# Patient Record
Sex: FEMALE | Race: WHITE | NOT HISPANIC OR LATINO | Employment: FULL TIME | URBAN - METROPOLITAN AREA
[De-identification: names, ages, dates, MRNs, and addresses within clinical notes are randomized per-mention and may not be internally consistent; named-entity substitution may affect disease eponyms.]

---

## 2017-04-21 ENCOUNTER — TELEMEDICINE2 (OUTPATIENT)
Dept: URGENT CARE | Facility: CLINIC | Age: 52
End: 2017-04-21
Payer: COMMERCIAL

## 2017-04-21 DIAGNOSIS — J01.40 ACUTE NON-RECURRENT PANSINUSITIS: ICD-10-CM

## 2017-04-21 PROCEDURE — 99214 OFFICE O/P EST MOD 30 MIN: CPT | Mod: GT | Performed by: PHYSICIAN ASSISTANT

## 2017-04-21 RX ORDER — AMOXICILLIN AND CLAVULANATE POTASSIUM 875; 125 MG/1; MG/1
1 TABLET, FILM COATED ORAL 2 TIMES DAILY
Qty: 14 TAB | Refills: 0 | Status: SHIPPED | OUTPATIENT
Start: 2017-04-21 | End: 2018-11-23

## 2017-04-21 ASSESSMENT — ENCOUNTER SYMPTOMS
SORE THROAT: 1
CHILLS: 0
SHORTNESS OF BREATH: 0
DIZZINESS: 0
SWOLLEN GLANDS: 1
ABDOMINAL PAIN: 0
MYALGIAS: 0
HEADACHES: 0
COUGH: 0
NAUSEA: 1
PALPITATIONS: 0
WHEEZING: 0
FEVER: 0
FLANK PAIN: 0
DIARRHEA: 1
VOMITING: 1
SINUS PRESSURE: 1

## 2017-04-21 NOTE — PROGRESS NOTES
Subjective:      Heaven Chavis is a 51 y.o. female who presents with Sinus Problem    Encounter was completed using secure and encrypted videoconferencing equipment, the patient gave consent, and patient identification was confirmed           Sinus Problem  This is a new problem. The current episode started in the past 7 days. The problem has been gradually worsening since onset. There has been no fever. Associated symptoms include congestion, sinus pressure, a sore throat and swollen glands. Pertinent negatives include no chills, coughing, ear pain, headaches or shortness of breath. Past treatments include oral decongestants. The treatment provided no relief.       PMH:  has no past medical history on file.  MEDS:   Current outpatient prescriptions:   •  Levothyroxine Sodium (SYNTHROID PO), Take  by mouth., Disp: , Rfl:   •  NALTREXONE HCL PO, Take  by mouth., Disp: , Rfl:   •  TEMAZEPAM PO, Take  by mouth., Disp: , Rfl:   •  LIOTRIX, T3-T4, PO, Take  by mouth., Disp: , Rfl:   •  modafinil (PROVIGIL) 200 MG Tab, Take 200 mg by mouth every day., Disp: , Rfl:   •  cyclobenzaprine (FLEXERIL) 10 MG Tab, Take 1 Tab by mouth 3 times a day as needed., Disp: 30 Tab, Rfl: 0  ALLERGIES: No Known Allergies  SURGHX: No past surgical history on file.  SOCHX:    FH: family history is not on file.      Review of Systems   Constitutional: Positive for malaise/fatigue. Negative for fever and chills.   HENT: Positive for congestion, nosebleeds, sinus pressure and sore throat. Negative for ear pain.    Respiratory: Negative for cough, shortness of breath and wheezing.    Cardiovascular: Negative for chest pain, palpitations and leg swelling.   Gastrointestinal: Positive for nausea, vomiting and diarrhea. Negative for abdominal pain.   Genitourinary: Negative for dysuria, hematuria and flank pain.   Musculoskeletal: Negative for myalgias and joint pain.   Neurological: Negative for dizziness and headaches.       Medications,  Allergies, and current problem list reviewed today in Epic     Objective:     There were no vitals taken for this visit.     Physical Exam   Constitutional: She is oriented to person, place, and time. She appears well-developed and well-nourished. No distress.   HENT:   Head: Normocephalic and atraumatic.   Right Ear: External ear normal.   Left Ear: External ear normal.   Eyes: Right eye exhibits no discharge. Left eye exhibits no discharge.   Neurological: She is alert and oriented to person, place, and time.   Psychiatric: She has a normal mood and affect. Her behavior is normal. Judgment and thought content normal.   Nursing note and vitals reviewed.              Assessment/Plan:     1. Acute non-recurrent pansinusitis  amoxicillin-clavulanate (AUGMENTIN) 875-125 MG Tab     Patient states all GI symptoms have resolved. She is now only having upper respiratory/sinus symptoms. From Limited exam and presentation appear she has a viral upper respiratory infection. If no improvement over the next several days she will come in clinic to be seen in person.  Patient was given a contingent antibiotic prescription to fill and use as directed if symptoms progressed as discussed and agreed upon.  Tylenol and Motrin for fever and pain  OTC meds as discussed including oral decongestant if tolerated  Increase fluids and rest  Nasal spray, nasal wash, Grisel pot    Return to clinic or go to ED if symptoms worsen or persist. Indications for ED discussed at length. Patient voices understanding. Follow-up with your primary care provider in 3-5 days. Red flags discussed.    Please note that this dictation was created using voice recognition software. I have made every reasonable attempt to correct obvious errors, but I expect that there are errors of grammar and possibly content that I did not discover before finalizing the note.

## 2017-04-21 NOTE — MR AVS SNAPSHOT
Heaven Chavis   2017 1:30 PM   Appointment   MRN: [id number]    Department:   Group   Dept Phone:  [phone]    Description:  Female : 1965   Provider:  JACOB THAKUR PROVIDER           Allergies as of 2017     No Known Allergies      Basic Information     Date Of Birth Sex Race Ethnicity Preferred Language    1965 Female White Unknown English      Health Maintenance        Date Due Completion Dates    IMM DTaP/Tdap/Td Vaccine (1 - Tdap) 7/15/1982 ---    PAP SMEAR 7/15/1984 ---    MAMMOGRAM 7/15/2003 ---    COLONOSCOPY 7/15/2013 ---            Current Immunizations     No immunizations on file.      Below and/or attached are the medications your provider expects you to take. Review all of your home medications and newly ordered medications with your provider and/or pharmacist. Follow medication instructions as directed by your provider and/or pharmacist. Please keep your medication list with you and share with your provider. Update the information when medications are discontinued, doses are changed, or new medications (including over-the-counter products) are added; and carry medication information at all times in the event of emergency situations     Allergies:  No Known Allergies          Medications  Valid as of: 2017 -  1:37 PM    Generic Name Brand Name Tablet Size Instructions for use    Cyclobenzaprine HCl (Tab) FLEXERIL 10 MG Take 1 Tab by mouth 3 times a day as needed.        Levothyroxine Sodium   Take  by mouth.        Liotrix (T3-T4)   Take  by mouth.        Modafinil (Tab) PROVIGIL 200 MG Take 200 mg by mouth every day.        Naltrexone HCl   Take  by mouth.        Temazepam   Take  by mouth.        .                 Medicines prescribed today were sent to:     [id number] - [place], [place] - [address] [facility] [address] [facility] [address]    [address] [place] [address]    Phone: [phone] Fax: [phone]       Open 24 Hours?: No      Medication refill instructions:       If your prescription bottle indicates you have medication refills left, it is not necessary to call your provider’s office. Please contact your pharmacy and they will refill your medication.    If your prescription bottle indicates you do not have any refills left, you may request refills at any time through one of the following ways: The online Ideal Power system (except Urgent Care), by calling your provider’s office, or by asking your pharmacy to contact your provider’s office with a refill request. Medication refills are processed only during regular business hours and may not be available until the next business day. Your provider may request additional information or to have a follow-up visit with you prior to refilling your medication.   *Please Note: Medication refills are assigned a new Rx number when refilled electronically. Your pharmacy may indicate that no refills were authorized even though a new prescription for the same medication is available at the pharmacy. Please request the medicine by name with the pharmacy before contacting your provider for a refill.           Ideal Power Access Code: [email or web address]  Expires: 5/21/2017  1:37 PM    Ideal Power  A secure, online tool to manage your health information     [employer]’s Ideal Power® is a secure, online tool that connects you to your personalized health information from the privacy of your home -- day or night - making it very easy for you to manage your healthcare. Once the activation process is completed, you can even access your medical information using the Ideal Power khalida, which is available for free in the Apple Khalida store or Google Play store.     Ideal Power provides the following levels of access (as shown below):   My Chart Features   [facility] Doctor [facility]  Specialists [facility]  Urgent  Care Non-[facility]  Primary Care  Doctor   Email your healthcare team securely and privately 24/7 X X  X    Manage appointments: schedule your next appointment; view details of past/upcoming appointments X      Request prescription refills. X      View recent personal medical records, including lab and immunizations X X X X   View health record, including health history, allergies, medications X X X X   Read reports about your outpatient visits, procedures, consult and ER notes X X X X   See your discharge summary, which is a recap of your hospital and/or ER visit that includes your diagnosis, lab results, and care plan. X X       How to register for Vinspi:  1. Go to  [email or web address].  2. Click on the Sign Up Now box, which takes you to the New Member Sign Up page. You will need to provide the following information:  a. Enter your Vinspi Access Code exactly as it appears at the top of this page. (You will not need to use this code after you’ve completed the sign-up process. If you do not sign up before the expiration date, you must request a new code.)   b. Enter your date of birth.   c. Enter your home email address.   d. Click Submit, and follow the next screen’s instructions.  3. Create a Vinspi ID. This will be your Vinspi login ID and cannot be changed, so think of one that is secure and easy to remember.  4. Create a Vinspi password. You can change your password at any time.  5. Enter your Password Reset Question and Answer. This can be used at a later time if you forget your password.   6. Enter your e-mail address. This allows you to receive e-mail notifications when new information is available in Vinspi.  7. Click Sign Up. You can now view your health information.    For assistance activating your Vinspi account, call [phone]

## 2017-07-19 ENCOUNTER — TELEMEDICINE2 (OUTPATIENT)
Dept: URGENT CARE | Facility: CLINIC | Age: 52
End: 2017-07-19
Payer: COMMERCIAL

## 2017-07-19 DIAGNOSIS — H00.012 HORDEOLUM EXTERNUM OF RIGHT LOWER EYELID: ICD-10-CM

## 2017-07-19 DIAGNOSIS — H10.31 ACUTE BACTERIAL CONJUNCTIVITIS OF RIGHT EYE: ICD-10-CM

## 2017-07-19 PROCEDURE — 99214 OFFICE O/P EST MOD 30 MIN: CPT | Mod: GT | Performed by: NURSE PRACTITIONER

## 2017-07-19 RX ORDER — TOBRAMYCIN 3 MG/ML
1 SOLUTION/ DROPS OPHTHALMIC 4 TIMES DAILY
Qty: 1 BOTTLE | Refills: 0 | Status: SHIPPED | OUTPATIENT
Start: 2017-07-19 | End: 2018-11-23

## 2017-07-19 ASSESSMENT — ENCOUNTER SYMPTOMS
DOUBLE VISION: 0
SORE THROAT: 0
EYE REDNESS: 1
FEVER: 0
HEADACHES: 0
PHOTOPHOBIA: 0
MYALGIAS: 0
WEAKNESS: 0
COUGH: 0
CHILLS: 0
EYE PAIN: 0
BLURRED VISION: 0
DIZZINESS: 0
NECK PAIN: 0
EYE DISCHARGE: 1

## 2017-07-19 NOTE — PROGRESS NOTES
"Subjective:      Heaven Chavis is a 52 y.o. female who presents with Eye Problem            Eye Problem   Associated symptoms include an eye discharge and eye redness. Pertinent negatives include no blurred vision, double vision, fever, itching, photophobia or weakness.   Heaven is a 52 year old female who is on telemedicine encounter for swollen red eye today. Denies trauma, injury or seasonal allergy symptoms but has some nasal congestion and ear pressure without sinus pressure. Denies PND, sore throat or cough. No fevers mentioned. Denies rubbing eyes, vision change, tearing but states has crusty d/c today. Denies eye pressure or pain inside eye or feeling of foreign object or pain with blinking. C/o swollen redness to right lower eyelid with tenderness with touching lower portion of eye and \"burning\" feeling at site. 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    PMH:  has no past medical history on file.  MEDS:   Current outpatient prescriptions:   •  tobramycin (TOBREX) 0.3 % Solution ophthalmic solution, Place 1 Drop in right eye 4 times a day., Disp: 1 Bottle, Rfl: 0  •  Levothyroxine Sodium (SYNTHROID PO), Take  by mouth., Disp: , Rfl:   •  NALTREXONE HCL PO, Take  by mouth., Disp: , Rfl:   •  TEMAZEPAM PO, Take  by mouth., Disp: , Rfl:   •  LIOTRIX, T3-T4, PO, Take  by mouth., Disp: , Rfl:   •  modafinil (PROVIGIL) 200 MG Tab, Take 200 mg by mouth every day., Disp: , Rfl:   •  amoxicillin-clavulanate (AUGMENTIN) 875-125 MG Tab, Take 1 Tab by mouth 2 times a day., Disp: 14 Tab, Rfl: 0  •  cyclobenzaprine (FLEXERIL) 10 MG Tab, Take 1 Tab by mouth 3 times a day as needed., Disp: 30 Tab, Rfl: 0  ALLERGIES: No Known Allergies  SURGHX: History reviewed. No pertinent past surgical history.  SOCHX:    FH: Family history was reviewed, no pertinent findings to report      Review of Systems   Constitutional: Negative for fever, chills and malaise/fatigue.   HENT: " "Positive for congestion and ear pain. Negative for sore throat.    Eyes: Positive for discharge and redness. Negative for blurred vision, double vision, photophobia and pain.   Respiratory: Negative for cough.    Musculoskeletal: Negative for myalgias and neck pain.   Skin: Negative for itching and rash.   Neurological: Negative for dizziness, weakness and headaches.   Endo/Heme/Allergies: Negative for environmental allergies.   All other systems reviewed and are negative.         Objective:     There were no vitals taken for this visit.     Physical Exam   Eyes: EOM are normal. Right eye exhibits chemosis. Right eye exhibits no discharge, no exudate and no hordeolum. No foreign body present in the right eye. Right conjunctiva is injected. Right conjunctiva has no hemorrhage.       Appears to be \"puffiness\" to right lower eyelid with redness and redness of lower eyelid conjunctiva, sclera appears white, no subconjunctival hemorrhage seen, unable to visualize stye but when she touches lower eyelid she notes \"tenderness\", wearing prescription eye glasses during encounter.     Patient was presented for a telehealth consultation via secure and encrypted videoconferencing technology, patient gave consent and identification was confirmed.               Assessment/Plan:     1. Acute bacterial conjunctivitis of right eye    - tobramycin (TOBREX) 0.3 % Solution ophthalmic solution; Place 1 Drop in right eye 4 times a day.  Dispense: 1 Bottle; Refill: 0    2. Hordeolum externum of right lower eyelid      May use longer acting antihistamine prn for seasonal allergy symptoms  May use cool compresses for eye swelling/\"burning\" feeling  May apply warm, moist compresses to lower eye for treatment for possible stye   May use NSAID prn for eye discomfort  Avoid touching eyes  May clean eyes with mild dilute soap along eyelash line with eyes closed, rinse with plenty of water  Avoid wearing eye makeup until cleared, wear eye glasses " for vision until eye redness/disocmfort improves  Monitor for increase in redness or swelling, vision change, eye pain- need re-evaluation immediately in next 24 hrs at  clinic, patient agrees to this

## 2017-07-19 NOTE — MR AVS SNAPSHOT
Heaven Chavis   2017 11:00 AM   Telemedicine2   MRN: [id number]    Department:  [facility]   Dept Phone:  [phone]    Description:  Female : 1965   Provider:  MEL León           Reason for Visit     Eye Problem right eye swelling today       Allergies as of 2017     No Known Allergies      You were diagnosed with     Acute bacterial conjunctivitis of right eye   [2482661]       Hordeolum externum of right lower eyelid   [[id number]]         Vital Signs     Smoking Status                   Unknown If Ever Smoked           Basic Information     Date Of Birth Sex Race Ethnicity Preferred Language    1965 Female White Unknown English      Health Maintenance        Date Due Completion Dates    IMM DTaP/Tdap/Td Vaccine (1 - Tdap) 7/15/1982 ---    PAP SMEAR 7/15/1984 ---    MAMMOGRAM 7/15/2003 ---    COLONOSCOPY 7/15/2013 ---    IMM INFLUENZA (1) 2017 ---            Current Immunizations     No immunizations on file.      Below and/or attached are the medications your provider expects you to take. Review all of your home medications and newly ordered medications with your provider and/or pharmacist. Follow medication instructions as directed by your provider and/or pharmacist. Please keep your medication list with you and share with your provider. Update the information when medications are discontinued, doses are changed, or new medications (including over-the-counter products) are added; and carry medication information at all times in the event of emergency situations     Allergies:  No Known Allergies          Medications  Valid as of: 2017 - 11:55 AM    Generic Name Brand Name Tablet Size Instructions for use    Amoxicillin-Pot Clavulanate (Tab) AUGMENTIN 875-125 MG Take 1 Tab by mouth 2 times a day.        Cyclobenzaprine HCl (Tab) FLEXERIL 10 MG Take 1 Tab by mouth 3 times a day as needed.        Levothyroxine Sodium   Take  by mouth.        Liotrix  (T3-T4)   Take  by mouth.        Modafinil (Tab) PROVIGIL 200 MG Take 200 mg by mouth every day.        Naltrexone HCl   Take  by mouth.        Temazepam   Take  by mouth.        Tobramycin (Solution) TOBREX 0.3 % Place 1 Drop in right eye 4 times a day.        .                 Medicines prescribed today were sent to:     [id number]  [place], [place] - [address] [facility] [address]    [address] [place] [address]    Phone: [phone] Fax: [phone]    Open 24 Hours?: No      Medication refill instructions:       If your prescription bottle indicates you have medication refills left, it is not necessary to call your provider’s office. Please contact your pharmacy and they will refill your medication.    If your prescription bottle indicates you do not have any refills left, you may request refills at any time through one of the following ways: The online Language123 system (except Urgent Care), by calling your provider’s office, or by asking your pharmacy to contact your provider’s office with a refill request. Medication refills are processed only during regular business hours and may not be available until the next business day. Your provider may request additional information or to have a follow-up visit with you prior to refilling your medication.   *Please Note: Medication refills are assigned a new Rx number when refilled electronically. Your pharmacy may indicate that no refills were authorized even though a new prescription for the same medication is available at the pharmacy. Please request the medicine by name with the pharmacy before contacting your provider for a refill.           Language123 Access Code: [email or web address]  Expires: 8/6/2017  4:08 AM    Language123  A secure, online tool to manage your health information     [employer] Language123® is a secure, online tool that connects you to your personalized health information from the privacy of your home --  day or night - making it very easy for you to manage your healthcare. Once the activation process is completed, you can even access your medical information using the [employer] khalida, which is available for free in the Apple Khalida store or Google Play store.     Rue La La provides the following levels of access (as shown below):   My Chart Features   [facility] Primary [facility] Doctor [facility]  Specialists [facility]  Urgent  Care Non-[facility]  Primary Care  Doctor   Email your healthcare team securely and privately 24/7 X X X    Manage appointments: schedule your next appointment; view details of past/upcoming appointments X      Request prescription refills. X      View recent personal medical records, including lab and immunizations X X X X   View health record, including health history, allergies, medications X X X X   Read reports about your outpatient visits, procedures, consult and ER notes X X X X   See your discharge summary, which is a recap of your hospital and/or ER visit that includes your diagnosis, lab results, and care plan. X X       How to register for Rue La La:  1. Go to  [email or web address].  2. Click on the Sign Up Now box, which takes you to the New Member Sign Up page. You will need to provide the following information:  a. Enter your Rue La La Access Code exactly as it appears at the top of this page. (You will not need to use this code after you’ve completed the sign-up process. If you do not sign up before the expiration date, you must request a new code.)   b. Enter your date of birth.   c. Enter your home email address.   d. Click Submit, and follow the next screen’s instructions.  3. Create a Rue La La ID. This will be your Rue La La login ID and cannot be changed, so think of one that is secure and easy to remember.  4. Create a Rue La La password. You can change your password at any time.  5. Enter your Password Reset Question and Answer. This can be used at a later time if you forget your password.   6. Enter  your e-mail address. This allows you to receive e-mail notifications when new information is available in [employer]  7. Click Sign Up. You can now view your health information.    For assistance activating your [employer] account, call [phone]

## 2017-10-03 ENCOUNTER — TELEMEDICINE2 (OUTPATIENT)
Dept: URGENT CARE | Facility: CLINIC | Age: 52
End: 2017-10-03
Payer: COMMERCIAL

## 2017-10-03 DIAGNOSIS — R11.0 NAUSEA: ICD-10-CM

## 2017-10-03 DIAGNOSIS — A08.4 VIRAL GASTROENTERITIS: ICD-10-CM

## 2017-10-03 DIAGNOSIS — R19.7 DIARRHEA, UNSPECIFIED TYPE: ICD-10-CM

## 2017-10-03 PROCEDURE — 99214 OFFICE O/P EST MOD 30 MIN: CPT | Mod: GT | Performed by: NURSE PRACTITIONER

## 2017-10-03 RX ORDER — ONDANSETRON 4 MG/1
4 TABLET, FILM COATED ORAL EVERY 6 HOURS PRN
Qty: 12 TAB | Refills: 0 | Status: SHIPPED | OUTPATIENT
Start: 2017-10-03 | End: 2017-10-06

## 2017-10-03 ASSESSMENT — ENCOUNTER SYMPTOMS
COUGH: 0
NAUSEA: 1
ORTHOPNEA: 0
FEVER: 0
HEMOPTYSIS: 0
PALPITATIONS: 0
NECK PAIN: 0
ABDOMINAL PAIN: 1
CHILLS: 1
WHEEZING: 0
CONSTIPATION: 0
SHORTNESS OF BREATH: 0
DIARRHEA: 1
DIZZINESS: 0
WEAKNESS: 0
BACK PAIN: 0
VOMITING: 0
DIAPHORESIS: 0
BLOOD IN STOOL: 0
MYALGIAS: 1
SPUTUM PRODUCTION: 0
HEARTBURN: 0

## 2017-10-03 NOTE — PROGRESS NOTES
Subjective:      Heaven Chavis is a 52 y.o. female who presents with Diarrhea (diarrhea/ shooting abdominal pain/ body aches/ chills since last night)            Patient presents today for a virtual visit.  Identification was verified.  She was informed that encounter would be conducted using secure, encrypted videoconferencing equipment and consent was obtained.   She reports new onset of myalgias, chills, intermittent nausea without vomiting, intermittent abdominal pain and 2 episodes of diarrhea since last night.  She notes some shooting abdominal pain when she is having diarrhea, otherwise it is a dull, intermittent cramping.  No fever.  No flank pain or dysuria.  No blood, mucus, or tarry stools.  No recent foreign travel or drinking from suspected contaminated water sources.  She has not taken any medication to treat the symptoms.          Review of Systems   Constitutional: Positive for chills and malaise/fatigue. Negative for diaphoresis and fever.   Respiratory: Negative for cough, hemoptysis, sputum production, shortness of breath and wheezing.    Cardiovascular: Negative for chest pain, palpitations, orthopnea and leg swelling.   Gastrointestinal: Positive for abdominal pain, diarrhea and nausea. Negative for blood in stool, constipation, heartburn, melena and vomiting.   Genitourinary: Negative for dysuria, frequency, hematuria and urgency.   Musculoskeletal: Positive for myalgias. Negative for back pain and neck pain.   Neurological: Negative for dizziness and weakness.     Medications, Allergies, and current problem list reviewed today in Epic     Objective:     There were no vitals taken for this visit.     Physical Exam   Constitutional: She appears well-developed and well-nourished. She appears distressed.   Patient appears uncomfortable but non-toxic.   Eyes: Pupils are equal, round, and reactive to light. No scleral icterus.   Pulmonary/Chest: Effort normal.   Abdominal: Soft. She exhibits no  distension. There is no tenderness. There is no rebound and no guarding.   Skin: She is not diaphoretic.               Assessment/Plan:     1. Viral gastroenteritis    2. Nausea  - ondansetron (ZOFRAN) 4 MG Tab tablet; Take 1 Tab by mouth every 6 hours as needed for Nausea/Vomiting for up to 3 days.  Dispense: 12 Tab; Refill: 0    3. Diarrhea, unspecified type    Discussed limitations of virtual visit for abdominal exam and evaluation.  Advised patient that based on the history and limited exam findings, this is likely a self-limiting viral GI illness.  However differential is broad including diverticulitis or other various etiology of the acute abdomen.  OTC NSAIDs or tylenol prn fever, pain.  Zofran as prescribed prn nausea.  Maintain adequate po hydration.  Advance diet as tolerated.  RTC symptoms persist despite current plan of care, go to ED if worse.  Patient verbalized understanding of and agreed with plan of care.

## 2017-10-03 NOTE — LETTER
October 3, 2017         Patient: Heaven Chavis   YOB: 1965   Date of Visit: 10/3/2017           To Whom it May Concern:    Heaven Chavis was seen by me on 10/3/2017. She may return to work in 2-3 days as symptoms improve.    If you have any questions or concerns, please don't hesitate to call.        Sincerely,           STEVEN Del Valle.  Electronically Signed

## 2018-01-23 ENCOUNTER — HOSPITAL ENCOUNTER (OUTPATIENT)
Dept: LAB | Facility: MEDICAL CENTER | Age: 53
End: 2018-01-23
Attending: INTERNAL MEDICINE
Payer: COMMERCIAL

## 2018-01-23 LAB
T3FREE SERPL-MCNC: 2.58 PG/ML (ref 2.4–4.2)
T4 FREE SERPL-MCNC: 0.8 NG/DL (ref 0.53–1.43)
TSH SERPL DL<=0.005 MIU/L-ACNC: 1.64 UIU/ML (ref 0.38–5.33)

## 2018-01-23 PROCEDURE — 84481 FREE ASSAY (FT-3): CPT

## 2018-01-23 PROCEDURE — 84443 ASSAY THYROID STIM HORMONE: CPT

## 2018-01-23 PROCEDURE — 36415 COLL VENOUS BLD VENIPUNCTURE: CPT

## 2018-01-23 PROCEDURE — 84439 ASSAY OF FREE THYROXINE: CPT

## 2018-05-02 ENCOUNTER — HOSPITAL ENCOUNTER (OUTPATIENT)
Facility: MEDICAL CENTER | Age: 53
End: 2018-05-02
Attending: PHYSICIAN ASSISTANT
Payer: COMMERCIAL

## 2018-05-02 ENCOUNTER — OFFICE VISIT (OUTPATIENT)
Dept: URGENT CARE | Facility: PHYSICIAN GROUP | Age: 53
End: 2018-05-02
Payer: COMMERCIAL

## 2018-05-02 VITALS
RESPIRATION RATE: 16 BRPM | BODY MASS INDEX: 31.41 KG/M2 | DIASTOLIC BLOOD PRESSURE: 78 MMHG | SYSTOLIC BLOOD PRESSURE: 130 MMHG | HEIGHT: 64 IN | WEIGHT: 184 LBS | HEART RATE: 113 BPM | TEMPERATURE: 100.2 F | OXYGEN SATURATION: 98 %

## 2018-05-02 DIAGNOSIS — J03.90 EXUDATIVE TONSILLITIS: ICD-10-CM

## 2018-05-02 DIAGNOSIS — R52 BODY ACHES: ICD-10-CM

## 2018-05-02 DIAGNOSIS — R50.9 FEVER, UNSPECIFIED FEVER CAUSE: ICD-10-CM

## 2018-05-02 LAB
FLUAV+FLUBV AG SPEC QL IA: NEGATIVE
HETEROPH AB SER QL LA: NEGATIVE
INT CON NEG: NEGATIVE
INT CON NEG: NEGATIVE
INT CON NEG: NORMAL
INT CON POS: NORMAL
INT CON POS: POSITIVE
INT CON POS: POSITIVE
S PYO AG THROAT QL: NEGATIVE

## 2018-05-02 PROCEDURE — 87880 STREP A ASSAY W/OPTIC: CPT | Performed by: PHYSICIAN ASSISTANT

## 2018-05-02 PROCEDURE — 87070 CULTURE OTHR SPECIMN AEROBIC: CPT

## 2018-05-02 PROCEDURE — 86308 HETEROPHILE ANTIBODY SCREEN: CPT | Performed by: PHYSICIAN ASSISTANT

## 2018-05-02 PROCEDURE — 87804 INFLUENZA ASSAY W/OPTIC: CPT | Performed by: PHYSICIAN ASSISTANT

## 2018-05-02 PROCEDURE — 99214 OFFICE O/P EST MOD 30 MIN: CPT | Performed by: PHYSICIAN ASSISTANT

## 2018-05-02 RX ORDER — AMOXICILLIN AND CLAVULANATE POTASSIUM 875; 125 MG/1; MG/1
1 TABLET, FILM COATED ORAL 2 TIMES DAILY
Qty: 20 TAB | Refills: 0 | Status: SHIPPED | OUTPATIENT
Start: 2018-05-02 | End: 2019-03-11

## 2018-05-02 NOTE — LETTER
May 2, 2018         Patient: Heaven Chavis   YOB: 1965   Date of Visit: 5/2/2018           To Whom it May Concern:    Heaven Chavis was seen in my clinic on 5/2/2018. She may return to work on 5/7/18.    If you have any questions or concerns, please don't hesitate to call.        Sincerely,           Benita Muller P.A.-C.  Electronically Signed

## 2018-05-02 NOTE — PROGRESS NOTES
Chief Complaint   Patient presents with   • Cough     Congestion       HISTORY OF PRESENT ILLNESS: Patient is a 52 y.o. female who presents today for the following:    Patient comes in for evaluation of a 2 day history of cough, sore throat, body aches, and fever. She reports associated headache, extreme fatigue. She denies difficulty breathing. Over-the-counter medication has not been helping.    There are no active problems to display for this patient.      Allergies:Patient has no known allergies.    Current Outpatient Prescriptions Ordered in [facility]   Medication Sig Dispense Refill   • tobramycin (TOBREX) 0.3 % Solution ophthalmic solution Place 1 Drop in right eye 4 times a day. (Patient not taking: Reported on 5/2/2018) 1 Bottle 0   • amoxicillin-clavulanate (AUGMENTIN) 875-125 MG Tab Take 1 Tab by mouth 2 times a day. (Patient not taking: Reported on 5/2/2018) 14 Tab 0   • Levothyroxine Sodium (SYNTHROID PO) Take  by mouth.     • NALTREXONE HCL PO Take  by mouth.     • TEMAZEPAM PO Take  by mouth.     • LIOTRIX, T3-T4, PO Take  by mouth.     • modafinil (PROVIGIL) 200 MG Tab Take 200 mg by mouth every day.     • cyclobenzaprine (FLEXERIL) 10 MG Tab Take 1 Tab by mouth 3 times a day as needed. (Patient not taking: Reported on 5/2/2018) 30 Tab 0     No current Epic-ordered facility-administered medications on file.        History reviewed. No pertinent past medical history.    Social History   Substance Use Topics   • Smoking status: Never Smoker   • Smokeless tobacco: Never Used   • Alcohol use Yes      Comment: Occasionally       No family status information on file.   History reviewed. No pertinent family history.    ROS:   Review of Systems   Constitutional: Negative for weight loss and malaise/fatigue.   HENT: Negative for ear pain, nosebleeds, and neck pain.    Eyes: Negative for blurred vision.   Respiratory: Negative for sputum production, shortness of breath and wheezing.    Cardiovascular: Negative for  "chest pain, palpitations, orthopnea and leg swelling.   Gastrointestinal: Negative for heartburn, nausea, vomiting and abdominal pain.   Genitourinary: Negative for dysuria, urgency and frequency.       Exam:  Blood pressure 130/78, pulse (!) 113, temperature 37.9 °C (100.2 °F), resp. rate 16, height 1.626 m (5' 4\"), weight 83.5 kg (184 lb), SpO2 98 %.  General: Well developed, well nourished. No distress but looks as though she does not feel well..  HEENT: Conjunctiva clear, lids without ptosis, PERRL/EOMI. Ears normal shape and contour, canals are clear bilaterally, tympanic membranes are benign. Nasal mucosa benign. Oropharynx is moderately erythematous with scattered exudate. Moist mucous membranes.  Pulmonary: Clear to ausculation and percussion.  Normal effort. No rales, ronchi, or wheezing.   Cardiovascular: Regular rate and rhythm without murmur. No edema.   Neurologic: Grossly nonfocal.  Lymph: No cervical lymphadenopathy noted.  Skin: Warm, dry, good turgor. No rashes in visible areas.   Psych: Normal mood. Alert and oriented x3. Judgment and insight is normal.    Rapid strep: Negative    Rapid influenza: Negative    Rapid Mono: Negative    Assessment/Plan:  Discussed likely viral etiology. Oxygenation is within normal limits. Heart rate lately increased due to increase in temperature. Discussed appropriate over-the-counter symptomatic medication, and when to return to clinic. Encourage fluids. Will contact patient with culture results. Contingent antibiotic prescription given to patient to fill upon meeting criteria of guidelines discussed.   1. Exudative tonsillitis  POCT Rapid Strep A    CULTURE THROAT   2. Body aches  POCT Rapid Strep A    CULTURE THROAT    POCT Influenza A/B   3. Fever, unspecified fever cause  POCT Rapid Strep A    CULTURE THROAT    POCT Influenza A/B       "

## 2018-05-04 LAB
BACTERIA SPEC RESP CULT: NORMAL
SIGNIFICANT IND 70042: NORMAL
SITE SITE: NORMAL
SOURCE SOURCE: NORMAL

## 2018-05-08 ENCOUNTER — OFFICE VISIT (OUTPATIENT)
Dept: URGENT CARE | Facility: CLINIC | Age: 53
End: 2018-05-08
Payer: COMMERCIAL

## 2018-05-08 VITALS
DIASTOLIC BLOOD PRESSURE: 78 MMHG | WEIGHT: 184 LBS | RESPIRATION RATE: 16 BRPM | HEIGHT: 64 IN | HEART RATE: 87 BPM | BODY MASS INDEX: 31.41 KG/M2 | OXYGEN SATURATION: 96 % | SYSTOLIC BLOOD PRESSURE: 112 MMHG | TEMPERATURE: 98.3 F

## 2018-05-08 DIAGNOSIS — J98.8 RESPIRATORY INFECTION: ICD-10-CM

## 2018-05-08 DIAGNOSIS — J02.9 PHARYNGITIS, UNSPECIFIED ETIOLOGY: ICD-10-CM

## 2018-05-08 PROCEDURE — 99214 OFFICE O/P EST MOD 30 MIN: CPT | Performed by: PHYSICIAN ASSISTANT

## 2018-05-08 RX ORDER — AMOXICILLIN 400 MG/5ML
POWDER, FOR SUSPENSION ORAL
Qty: 1 BOTTLE | Refills: 0 | Status: SHIPPED | OUTPATIENT
Start: 2018-05-08 | End: 2018-11-23

## 2018-05-08 ASSESSMENT — ENCOUNTER SYMPTOMS
RHINORRHEA: 1
TINGLING: 0
NECK PAIN: 0
VOMITING: 0
WHEEZING: 0
DIARRHEA: 0
SPUTUM PRODUCTION: 1
EYE DISCHARGE: 0
COUGH: 1
ABDOMINAL PAIN: 0
HEADACHES: 1
SORE THROAT: 1
EYE REDNESS: 0
MYALGIAS: 1
FEVER: 0
DIZZINESS: 0
SHORTNESS OF BREATH: 0
CHILLS: 1

## 2018-05-08 NOTE — PROGRESS NOTES
Subjective:      Heaven Chavis is a 52 y.o. female who presents with Cough (cough/ sore throat/ headache same since last visit; patient started taking antibiotics Friday but lost them yesterday )            Patient is a 52-year-old female who presents today with continued cough, sore throat intermittent headache and body aches for the last few days. Patient was originally evaluated on March 2 for similar symptoms-was diagnosed with exudative pharyngitis with a negative strep and negative influenza-throat culture since has been negative. She was started on a contingent antibiotic Augmentin of which she started 3 days ago. She reports significant improvement after starting on the antibiotic however her medication fell out of her pocket last night.      Cough   This is a new problem. The current episode started in the past 7 days. The problem has been gradually improving. The problem occurs every few minutes. The cough is non-productive. Associated symptoms include chills, ear congestion, headaches, myalgias, nasal congestion, postnasal drip, rhinorrhea and a sore throat. Pertinent negatives include no chest pain, ear pain, eye redness, fever, rash, shortness of breath or wheezing. Nothing aggravates the symptoms. She has tried OTC cough suppressant (Augmentin) for the symptoms. The treatment provided mild relief. Her past medical history is significant for bronchitis and pneumonia. There is no history of asthma.       Review of Systems   Constitutional: Positive for chills and malaise/fatigue. Negative for fever.   HENT: Positive for congestion, postnasal drip, rhinorrhea and sore throat. Negative for ear discharge and ear pain.    Eyes: Negative for discharge and redness.   Respiratory: Positive for cough and sputum production. Negative for shortness of breath and wheezing.    Cardiovascular: Negative for chest pain and leg swelling.   Gastrointestinal: Negative for abdominal pain, diarrhea and vomiting.  "  Genitourinary: Negative for dysuria and urgency.   Musculoskeletal: Positive for myalgias. Negative for neck pain.   Skin: Negative for itching and rash.   Neurological: Positive for headaches. Negative for dizziness and tingling.          Objective:     /78   Pulse 87   Temp 36.8 °C (98.3 °F)   Resp 16   Ht 1.626 m (5' 4\")   Wt 83.5 kg (184 lb)   SpO2 96%   BMI 31.58 kg/m²  PMH:  has no past medical history on file.  MEDS:   Current Outpatient Prescriptions:   •  amoxicillin-clavulanate (AUGMENTIN) 875-125 MG Tab, Take 1 Tab by mouth 2 times a day., Disp: 20 Tab, Rfl: 0  •  Levothyroxine Sodium (SYNTHROID PO), Take  by mouth., Disp: , Rfl:   •  NALTREXONE HCL PO, Take  by mouth., Disp: , Rfl:   •  TEMAZEPAM PO, Take  by mouth., Disp: , Rfl:   •  modafinil (PROVIGIL) 200 MG Tab, Take 200 mg by mouth every day., Disp: , Rfl:   •  tobramycin (TOBREX) 0.3 % Solution ophthalmic solution, Place 1 Drop in right eye 4 times a day. (Patient not taking: Reported on 5/2/2018), Disp: 1 Bottle, Rfl: 0  •  amoxicillin-clavulanate (AUGMENTIN) 875-125 MG Tab, Take 1 Tab by mouth 2 times a day. (Patient not taking: Reported on 5/2/2018), Disp: 14 Tab, Rfl: 0  •  LIOTRIX, T3-T4, PO, Take  by mouth., Disp: , Rfl:   •  cyclobenzaprine (FLEXERIL) 10 MG Tab, Take 1 Tab by mouth 3 times a day as needed. (Patient not taking: Reported on 5/2/2018), Disp: 30 Tab, Rfl: 0  ALLERGIES: No Known Allergies  SURGHX: No past surgical history on file.  SOCHX:  reports that she has never smoked. She has never used smokeless tobacco. She reports that she drinks alcohol. She reports that she does not use drugs.  FH: Family history was reviewed, no pertinent findings to report    Physical Exam   Constitutional: She is oriented to person, place, and time. She appears well-developed and well-nourished.   HENT:   Head: Normocephalic and atraumatic.   Mouth/Throat: No oropharyngeal exudate.   Ears- Canals clear- TM- with clear fluid effusions " bilaterally.   Pos. PND, with slight erythema- without tonsillar edema or exudate.   Mild discharge noted bilaterally- to nares.      Eyes: EOM are normal. Pupils are equal, round, and reactive to light.   Neck: Normal range of motion. Neck supple.   Cardiovascular: Normal rate and regular rhythm.    No murmur heard.  Pulmonary/Chest: Effort normal and breath sounds normal. No respiratory distress.   Musculoskeletal: Normal range of motion. She exhibits no tenderness.   Lymphadenopathy:     She has no cervical adenopathy.   Neurological: She is alert and oriented to person, place, and time.   Skin: Skin is warm. No rash noted.   Psychiatric: She has a normal mood and affect. Her behavior is normal.   Vitals reviewed.              Assessment/Plan:     1. Pharyngitis, unspecified etiology  - amoxicillin (AMOXIL) 400 MG/5ML suspension; Take 12.5 mL po BID for 7 days.  Dispense: 1 Bottle; Refill: 0    2. Respiratory infection    Explained prior lab findings to the patient-most likely viral etiology however patient had such significant improvement of symptoms with this continuation of Augmentin that I will rewrite for amoxicillin-as patient is requesting to oral medication at this time. Other supportive therapies were encouraged.  Encouraged OTC supportive meds PRN. Humidification, increase fluids, avoid night time dairy.   Patient given precautionary s/sx that mandate immediate follow up and evaluation in the ED. Advised of risks of not doing so.    DDX, Supportive care, and indications for immediate follow-up discussed with patient.    Instructed to return to clinic or nearest emergency department if we are not available for any change in condition, further concerns, or worsening of symptoms.    The patient demonstrated a good understanding and agreed with the treatment plan.

## 2018-08-03 ENCOUNTER — HOSPITAL ENCOUNTER (OUTPATIENT)
Dept: LAB | Facility: MEDICAL CENTER | Age: 53
End: 2018-08-03
Attending: FAMILY MEDICINE
Payer: COMMERCIAL

## 2018-08-03 LAB
ALBUMIN SERPL BCP-MCNC: 4.8 G/DL (ref 3.2–4.9)
ALBUMIN/GLOB SERPL: 2.1 G/DL
ALP SERPL-CCNC: 87 U/L (ref 30–99)
ALT SERPL-CCNC: 22 U/L (ref 2–50)
ANION GAP SERPL CALC-SCNC: 9 MMOL/L (ref 0–11.9)
AST SERPL-CCNC: 30 U/L (ref 12–45)
BILIRUB SERPL-MCNC: 0.4 MG/DL (ref 0.1–1.5)
BUN SERPL-MCNC: 11 MG/DL (ref 8–22)
CALCIUM SERPL-MCNC: 9.6 MG/DL (ref 8.5–10.5)
CHLORIDE SERPL-SCNC: 101 MMOL/L (ref 96–112)
CHOLEST SERPL-MCNC: 234 MG/DL (ref 100–199)
CO2 SERPL-SCNC: 30 MMOL/L (ref 20–33)
CREAT SERPL-MCNC: 0.74 MG/DL (ref 0.5–1.4)
ERYTHROCYTE [DISTWIDTH] IN BLOOD BY AUTOMATED COUNT: 49.9 FL (ref 35.9–50)
GLOBULIN SER CALC-MCNC: 2.3 G/DL (ref 1.9–3.5)
GLUCOSE SERPL-MCNC: 87 MG/DL (ref 65–99)
HCT VFR BLD AUTO: 37 % (ref 37–47)
HDLC SERPL-MCNC: 96 MG/DL
HGB BLD-MCNC: 11.9 G/DL (ref 12–16)
LDLC SERPL CALC-MCNC: 123 MG/DL
MCH RBC QN AUTO: 26.2 PG (ref 27–33)
MCHC RBC AUTO-ENTMCNC: 32.2 G/DL (ref 33.6–35)
MCV RBC AUTO: 81.5 FL (ref 81.4–97.8)
PLATELET # BLD AUTO: 343 K/UL (ref 164–446)
PMV BLD AUTO: 10.1 FL (ref 9–12.9)
POTASSIUM SERPL-SCNC: 3.8 MMOL/L (ref 3.6–5.5)
PROT SERPL-MCNC: 7.1 G/DL (ref 6–8.2)
RBC # BLD AUTO: 4.54 M/UL (ref 4.2–5.4)
SODIUM SERPL-SCNC: 140 MMOL/L (ref 135–145)
TRIGL SERPL-MCNC: 73 MG/DL (ref 0–149)
WBC # BLD AUTO: 6.1 K/UL (ref 4.8–10.8)

## 2018-08-03 PROCEDURE — 85027 COMPLETE CBC AUTOMATED: CPT

## 2018-08-03 PROCEDURE — 80061 LIPID PANEL: CPT

## 2018-08-03 PROCEDURE — 80053 COMPREHEN METABOLIC PANEL: CPT

## 2018-08-03 PROCEDURE — 36415 COLL VENOUS BLD VENIPUNCTURE: CPT

## 2018-11-23 ENCOUNTER — HOSPITAL ENCOUNTER (OUTPATIENT)
Facility: MEDICAL CENTER | Age: 53
End: 2018-11-23
Attending: FAMILY MEDICINE
Payer: COMMERCIAL

## 2018-11-23 ENCOUNTER — OFFICE VISIT (OUTPATIENT)
Dept: URGENT CARE | Facility: PHYSICIAN GROUP | Age: 53
End: 2018-11-23
Payer: COMMERCIAL

## 2018-11-23 VITALS
WEIGHT: 175 LBS | TEMPERATURE: 98.1 F | BODY MASS INDEX: 29.88 KG/M2 | DIASTOLIC BLOOD PRESSURE: 82 MMHG | SYSTOLIC BLOOD PRESSURE: 124 MMHG | OXYGEN SATURATION: 100 % | HEIGHT: 64 IN | HEART RATE: 88 BPM

## 2018-11-23 DIAGNOSIS — I73.00 RAYNAUD'S PHENOMENON WITHOUT GANGRENE: ICD-10-CM

## 2018-11-23 LAB
ALBUMIN SERPL BCP-MCNC: 4.5 G/DL (ref 3.2–4.9)
ALBUMIN/GLOB SERPL: 1.8 G/DL
ALP SERPL-CCNC: 105 U/L (ref 30–99)
ALT SERPL-CCNC: 18 U/L (ref 2–50)
ANION GAP SERPL CALC-SCNC: 8 MMOL/L (ref 0–11.9)
APPEARANCE UR: CLEAR
AST SERPL-CCNC: 23 U/L (ref 12–45)
BASOPHILS # BLD AUTO: 0.7 % (ref 0–1.8)
BASOPHILS # BLD: 0.05 K/UL (ref 0–0.12)
BILIRUB SERPL-MCNC: 0.2 MG/DL (ref 0.1–1.5)
BILIRUB UR STRIP-MCNC: NORMAL MG/DL
BUN SERPL-MCNC: 16 MG/DL (ref 8–22)
CALCIUM SERPL-MCNC: 9.2 MG/DL (ref 8.5–10.5)
CHLORIDE SERPL-SCNC: 106 MMOL/L (ref 96–112)
CO2 SERPL-SCNC: 28 MMOL/L (ref 20–33)
COLOR UR AUTO: YELLOW
CREAT SERPL-MCNC: 0.74 MG/DL (ref 0.5–1.4)
EOSINOPHIL # BLD AUTO: 0.09 K/UL (ref 0–0.51)
EOSINOPHIL NFR BLD: 1.2 % (ref 0–6.9)
ERYTHROCYTE [DISTWIDTH] IN BLOOD BY AUTOMATED COUNT: 49.6 FL (ref 35.9–50)
GLOBULIN SER CALC-MCNC: 2.5 G/DL (ref 1.9–3.5)
GLUCOSE SERPL-MCNC: 89 MG/DL (ref 65–99)
GLUCOSE UR STRIP.AUTO-MCNC: NORMAL MG/DL
HCT VFR BLD AUTO: 34.5 % (ref 37–47)
HGB BLD-MCNC: 11 G/DL (ref 12–16)
IMM GRANULOCYTES # BLD AUTO: 0.01 K/UL (ref 0–0.11)
IMM GRANULOCYTES NFR BLD AUTO: 0.1 % (ref 0–0.9)
KETONES UR STRIP.AUTO-MCNC: NORMAL MG/DL
LEUKOCYTE ESTERASE UR QL STRIP.AUTO: NORMAL
LYMPHOCYTES # BLD AUTO: 2.88 K/UL (ref 1–4.8)
LYMPHOCYTES NFR BLD: 39.2 % (ref 22–41)
MCH RBC QN AUTO: 26.4 PG (ref 27–33)
MCHC RBC AUTO-ENTMCNC: 31.9 G/DL (ref 33.6–35)
MCV RBC AUTO: 82.7 FL (ref 81.4–97.8)
MONOCYTES # BLD AUTO: 0.62 K/UL (ref 0–0.85)
MONOCYTES NFR BLD AUTO: 8.4 % (ref 0–13.4)
NEUTROPHILS # BLD AUTO: 3.7 K/UL (ref 2–7.15)
NEUTROPHILS NFR BLD: 50.4 % (ref 44–72)
NITRITE UR QL STRIP.AUTO: NORMAL
NRBC # BLD AUTO: 0 K/UL
NRBC BLD-RTO: 0 /100 WBC
PH UR STRIP.AUTO: 5.5 [PH] (ref 5–8)
PLATELET # BLD AUTO: 248 K/UL (ref 164–446)
PMV BLD AUTO: 11.2 FL (ref 9–12.9)
POTASSIUM SERPL-SCNC: 4.2 MMOL/L (ref 3.6–5.5)
PROT SERPL-MCNC: 7 G/DL (ref 6–8.2)
PROT UR QL STRIP: NORMAL MG/DL
RBC # BLD AUTO: 4.17 M/UL (ref 4.2–5.4)
RBC UR QL AUTO: NORMAL
RHEUMATOID FACT SER IA-ACNC: <10 IU/ML (ref 0–14)
SODIUM SERPL-SCNC: 142 MMOL/L (ref 135–145)
SP GR UR STRIP.AUTO: 1.02
TSH SERPL DL<=0.005 MIU/L-ACNC: 0.4 UIU/ML (ref 0.38–5.33)
UROBILINOGEN UR STRIP-MCNC: 0.2 MG/DL
WBC # BLD AUTO: 7.4 K/UL (ref 4.8–10.8)

## 2018-11-23 PROCEDURE — 86431 RHEUMATOID FACTOR QUANT: CPT

## 2018-11-23 PROCEDURE — 83516 IMMUNOASSAY NONANTIBODY: CPT

## 2018-11-23 PROCEDURE — 84443 ASSAY THYROID STIM HORMONE: CPT

## 2018-11-23 PROCEDURE — 99214 OFFICE O/P EST MOD 30 MIN: CPT | Performed by: FAMILY MEDICINE

## 2018-11-23 PROCEDURE — 80053 COMPREHEN METABOLIC PANEL: CPT

## 2018-11-23 PROCEDURE — 85025 COMPLETE CBC W/AUTO DIFF WBC: CPT

## 2018-11-23 PROCEDURE — 81002 URINALYSIS NONAUTO W/O SCOPE: CPT | Performed by: FAMILY MEDICINE

## 2018-11-23 PROCEDURE — 86235 NUCLEAR ANTIGEN ANTIBODY: CPT

## 2018-11-23 PROCEDURE — 85652 RBC SED RATE AUTOMATED: CPT

## 2018-11-24 ASSESSMENT — ENCOUNTER SYMPTOMS
EYES NEGATIVE: 1
CONSTITUTIONAL NEGATIVE: 1
DIZZINESS: 0
CARDIOVASCULAR NEGATIVE: 1
HEADACHES: 0
RESPIRATORY NEGATIVE: 1

## 2018-11-24 NOTE — PROGRESS NOTES
Subjective:      Heaven Chavis is a 53 y.o. female who presents with Hand Problem (Bilateral hand discoloration, bluing that comes and goes, tingling - onset last night)            This is a new problem  Patient is here for evaluation of intermittent bluish discoloration of both hands since early this morning.  It started in the left index and thumb, and then moved to the other hand.  She does have some tingling sensation that comes and goes.  She denies any whitish discoloration.  She never had this before.  She was not exposed to cold.  She was at work and this started around 4:30 AM today.  Denies any fever or chills.  Patient does have history of multiple sclerosis.  She also has history of hypothyroidism.  Denies any muscle weakness.  Currently denies any paresthesias. She denies h/o of embolic or thromboembolic disorders.          Review of Systems   Constitutional: Negative.    HENT: Negative.    Eyes: Negative.    Respiratory: Negative.    Cardiovascular: Negative.    Neurological: Negative for dizziness and headaches.     PMH:  has no past medical history on file.  MEDS:   Current Outpatient Prescriptions:   •  amoxicillin-clavulanate (AUGMENTIN) 875-125 MG Tab, Take 1 Tab by mouth 2 times a day., Disp: 20 Tab, Rfl: 0  •  Levothyroxine Sodium (SYNTHROID PO), Take  by mouth., Disp: , Rfl:   •  NALTREXONE HCL PO, Take  by mouth., Disp: , Rfl:   •  TEMAZEPAM PO, Take  by mouth., Disp: , Rfl:   •  LIOTRIX, T3-T4, PO, Take  by mouth., Disp: , Rfl:   •  modafinil (PROVIGIL) 200 MG Tab, Take 200 mg by mouth every day., Disp: , Rfl:   ALLERGIES: No Known Allergies  SURGHX: No past surgical history on file.  SOCHX:  reports that she has never smoked. She has never used smokeless tobacco. She reports that she drinks alcohol. She reports that she does not use drugs.  FH: Family history was reviewed, no pertinent findings to report       Objective:     /82 (BP Location: Right arm, Patient Position: Sitting, BP  "Cuff Size: Large adult)   Pulse 88   Temp 36.7 °C (98.1 °F) (Temporal)   Ht 1.626 m (5' 4\")   Wt 79.4 kg (175 lb)   SpO2 100%   BMI 30.04 kg/m²      Physical Exam   Constitutional: She is oriented to person, place, and time. She appears well-developed and well-nourished. No distress.   HENT:   Head: Normocephalic and atraumatic.   Eyes: Conjunctivae are normal.   Cardiovascular: Normal rate and regular rhythm.    Pulmonary/Chest: Effort normal. No respiratory distress.   Musculoskeletal:        Right wrist: Normal.        Left wrist: Normal.        Right hand: She exhibits normal range of motion, no tenderness, no bony tenderness, normal two-point discrimination, normal capillary refill, no deformity, no laceration and no swelling. Normal sensation noted. Normal strength noted.        Left hand: She exhibits normal range of motion, no tenderness, no bony tenderness, normal two-point discrimination, normal capillary refill, no deformity, no laceration and no swelling. Normal sensation noted. Normal strength noted.   Pulses were normal in both upper extremities.  I do see a faint bluish hue noted on the left index and thumb but also on the right index finger, FROM in both UE's   Neurological: She is alert and oriented to person, place, and time.   Skin: Skin is warm. She is not diaphoretic. No pallor.     Results for orders placed or performed in visit on 11/23/18   POCT Urinalysis   Result Value Ref Range    POC Color yellow Negative    POC Appearance clear Negative    POC Leukocyte Esterase neg Negative    POC Nitrites neg Negative    POC Urobiligen 0.2 Negative (0.2) mg/dL    POC Protein neg Negative mg/dL    POC Urine PH 5.5 5.0 - 8.0    POC Blood neg Negative    POC Specific Gravity 1.025 <1.005 - >1.030    POC Ketones neg Negative mg/dL    POC Bilirubin neg Negative mg/dL    POC Glucose neg Negative mg/dL               Assessment/Plan:     1. Raynaud's phenomenon without gangrene  - POCT Urinalysis  - " ALFRED SED RATE; Future  - TSH; Future  - CBC WITH DIFFERENTIAL; Future  - COMP METABOLIC PANEL; Future  - SYSTEMIC SCLEROSIS PANEL; Future  - RHEUMATOID ARTHRITIS FACTOR; Future      Unclear as to what is causing it  Full workup is in progress  CMP is basically normal, rheumatoid factor is within normal limits.  TSH is normal  CBC shows borderline anemia which has been the case.  C4 and C3 level pending.  FALLON is pending  Advised close follow-up  Warning signs reviewed  Advised to be seen ASAP if there is any new or worsening symptoms    The case was discussed and reviewed with Dr Antony during Dr. Montanez's training period.

## 2018-11-26 ENCOUNTER — TELEPHONE (OUTPATIENT)
Dept: URGENT CARE | Facility: PHYSICIAN GROUP | Age: 53
End: 2018-11-26

## 2018-11-26 DIAGNOSIS — L81.9 DISCOLORATION OF SKIN: ICD-10-CM

## 2018-11-26 NOTE — TELEPHONE ENCOUNTER
Lab called and stated that the Britton sed rate collected on Friday had a insufficient amount of blood and needs to be recollected.

## 2018-11-26 NOTE — Clinical Note
Hi to you all I saw this patient few days ago but unfortunately C3, C4 level and ES was not done. Please call this patient and have her go to main lab at [facility] to have this done in the next few days and encourage her a follow up with her primary care also. Thank You

## 2018-11-26 NOTE — PROGRESS NOTES
ESR, C3 and C4 level ordered with the other labs few days ago when I saw the patient was not done  Will reorder and have the patient called to come back for redraw

## 2018-12-11 LAB
ANA PATTERN Q5144: NORMAL
ANA TITER Q5145: NORMAL

## 2018-12-13 ENCOUNTER — TELEPHONE (OUTPATIENT)
Dept: URGENT CARE | Facility: CLINIC | Age: 53
End: 2018-12-13

## 2018-12-13 DIAGNOSIS — R76.8 ANA POSITIVE: ICD-10-CM

## 2018-12-13 NOTE — LETTER
December 13, 2018        Yakelin Ford  [address] [place] [address]        Dear Yakelin    Your FALLON results came back today and it is abnormal and borderline. Given your presentation last week, recommend a formal referral to Rheumatology, and they should be contacting you the next few days, if not please contact our care coordination at [phone]    Results for YAKELIN FORD (MRN [id number]) as of 12/13/2018 17:48   Ref. Range 11/23/2018 14:24   Anti-Scl-70 Latest Ref Range: 0 - 40 AU/mL 1   FALLON Pattern Unknown Speckled   RNA Polymerase III Ab, IgG Latest Ref Range: 0 - 19 Units 4   FALLON Titer Unknown 1:320       I would also follow up with your primary care doctor as soon as you can        Sincerely,        Brianna Montanez  Signed Electronically

## 2018-12-14 NOTE — TELEPHONE ENCOUNTER
Called patient with abnormal FALLON results that came back today, which was drawn 11/23  Left a message at cell phone  Based the presentation of that would recommend formal referral to rheumatology ASAP and also recommended follow-up with PCP as soon as she can

## 2018-12-21 LAB
ANA INTERPRETIVE COMMENT Q5143: ABNORMAL
ANTINUCLEAR ANTIBODY (ANA), HEP-2, IGG Q5142: DETECTED
ENA SCL70 IGG SER QL: 1 AU/ML (ref 0–40)
RNAP III AB SER IA-ACNC: 4 UNITS (ref 0–19)

## 2019-03-11 ENCOUNTER — OFFICE VISIT (OUTPATIENT)
Dept: MEDICAL GROUP | Facility: PHYSICIAN GROUP | Age: 54
End: 2019-03-11
Payer: COMMERCIAL

## 2019-03-11 VITALS
BODY MASS INDEX: 33.29 KG/M2 | DIASTOLIC BLOOD PRESSURE: 78 MMHG | OXYGEN SATURATION: 100 % | RESPIRATION RATE: 16 BRPM | HEART RATE: 78 BPM | HEIGHT: 64 IN | WEIGHT: 195 LBS | SYSTOLIC BLOOD PRESSURE: 128 MMHG | TEMPERATURE: 98.3 F

## 2019-03-11 DIAGNOSIS — E89.0 POSTOPERATIVE HYPOTHYROIDISM: ICD-10-CM

## 2019-03-11 DIAGNOSIS — G35 MULTIPLE SCLEROSIS (HCC): ICD-10-CM

## 2019-03-11 DIAGNOSIS — L30.9 ECZEMA, UNSPECIFIED TYPE: ICD-10-CM

## 2019-03-11 DIAGNOSIS — Z13.6 SCREENING FOR CARDIOVASCULAR CONDITION: ICD-10-CM

## 2019-03-11 PROCEDURE — 99214 OFFICE O/P EST MOD 30 MIN: CPT | Performed by: NURSE PRACTITIONER

## 2019-03-11 RX ORDER — BACLOFEN 10 MG/1
10 TABLET ORAL 3 TIMES DAILY
COMMUNITY
End: 2019-03-11 | Stop reason: SDUPTHER

## 2019-03-11 RX ORDER — LIOTHYRONINE SODIUM 5 UG/1
TABLET ORAL
Refills: 11 | COMMUNITY
Start: 2019-02-10

## 2019-03-11 RX ORDER — CLEMASTINE FUMARATE 2.68 MG
TABLET ORAL 3 TIMES DAILY
COMMUNITY
End: 2020-12-10

## 2019-03-11 RX ORDER — MODAFINIL 200 MG/1
200 TABLET ORAL DAILY
Qty: 30 TAB | Refills: 2 | Status: SHIPPED | OUTPATIENT
Start: 2019-03-11 | End: 2019-06-09

## 2019-03-11 RX ORDER — BACLOFEN 10 MG/1
10 TABLET ORAL 3 TIMES DAILY
Qty: 90 TAB | Refills: 1 | Status: SHIPPED | OUTPATIENT
Start: 2019-03-11 | End: 2019-06-25 | Stop reason: SDUPTHER

## 2019-03-11 RX ORDER — NORTRIPTYLINE HYDROCHLORIDE 25 MG/1
CAPSULE ORAL
Refills: 11 | COMMUNITY
Start: 2019-01-19 | End: 2020-12-10 | Stop reason: SDUPTHER

## 2019-03-11 RX ORDER — LEVOTHYROXINE SODIUM 150 MCG
150 TABLET ORAL
Refills: 11 | COMMUNITY
Start: 2019-02-10

## 2019-03-11 ASSESSMENT — PATIENT HEALTH QUESTIONNAIRE - PHQ9: CLINICAL INTERPRETATION OF PHQ2 SCORE: 0

## 2019-03-11 NOTE — PATIENT INSTRUCTIONS
See me again in August with labs before visit    Referral to neuro    Let me know where you want your mammogram order sent to    Will get colonoscopy report from [facility]

## 2019-03-11 NOTE — PROGRESS NOTES
Chief Complaint   Patient presents with   • New Patient         This is a 53 y.o.female patient that presents today with the following: Establish care with new PCP    Multiple sclerosis (HCC)  Patient has history of multiple sclerosis for which she is on baclofen and modafinil, usually followed by neurology but she needs new referral as her current neurologist is very difficult to get into.  Referral has been placed we will give her a temporary refill on the baclofen and the modafinil.  Reports this condition to be in stable.    Postoperative hypothyroidism  This is a chronic and stable condition, this is secondary to thyroidectomy, she had benign mass that was removed.  She is on levothyroxine as well as liothyronine.  She is closely followed by an endocrinologist, she does not need new referral.    Eczema  This is chronic and stable, she is on clemastine which seems to be controlling her eczema well.  She does not need refills on this.      No visits with results within 1 Month(s) from this visit.   Latest known visit with results is:   Hospital Outpatient Visit on 11/23/2018   Component Date Value   • TSH 11/23/2018 0.400    • WBC 11/23/2018 7.4    • RBC 11/23/2018 4.17*   • Hemoglobin 11/23/2018 11.0*   • Hematocrit 11/23/2018 34.5*   • MCV 11/23/2018 82.7    • MCH 11/23/2018 26.4*   • MCHC 11/23/2018 31.9*   • RDW 11/23/2018 49.6    • Platelet Count 11/23/2018 248    • MPV 11/23/2018 11.2    • Neutrophils-Polys 11/23/2018 50.40    • Lymphocytes 11/23/2018 39.20    • Monocytes 11/23/2018 8.40    • Eosinophils 11/23/2018 1.20    • Basophils 11/23/2018 0.70    • Immature Granulocytes 11/23/2018 0.10    • Nucleated RBC 11/23/2018 0.00    • Neutrophils (Absolute) 11/23/2018 3.70    • Lymphs (Absolute) 11/23/2018 2.88    • Monos (Absolute) 11/23/2018 0.62    • Eos (Absolute) 11/23/2018 0.09    • Baso (Absolute) 11/23/2018 0.05    • Immature Granulocytes (a* 11/23/2018 0.01    • NRBC (Absolute) 11/23/2018 0.00    •  Sodium 11/23/2018 142    • Potassium 11/23/2018 4.2    • Chloride 11/23/2018 106    • Co2 11/23/2018 28    • Anion Gap 11/23/2018 8.0    • Glucose 11/23/2018 89    • Bun 11/23/2018 16    • Creatinine 11/23/2018 0.74    • Calcium 11/23/2018 9.2    • AST(SGOT) 11/23/2018 23    • ALT(SGPT) 11/23/2018 18    • Alkaline Phosphatase 11/23/2018 105*   • Total Bilirubin 11/23/2018 0.2    • Albumin 11/23/2018 4.5    • Total Protein 11/23/2018 7.0    • Globulin 11/23/2018 2.5    • A-G Ratio 11/23/2018 1.8    • Rheumatoid Factor -Neph- 11/23/2018 <10    • GFR If  11/23/2018 >60    • GFR If Non  Ameri* 11/23/2018 >60    • FALLON Pattern 11/23/2018 Speckled    • FALLON Titer 11/23/2018 1:320    • Anti-Scl-70 11/23/2018 1    • RNA Polymerase III Ab, I* 11/23/2018 4    • Antinuclear Antibody (AN* 11/23/2018 Detected*   • FALLON Interpretive Comment 11/23/2018 See Note          clinical course has been stable    Past Medical History:   Diagnosis Date   • Hashimoto's disease 2004   • MS (multiple sclerosis) (HCC) 2007       Past Surgical History:   Procedure Laterality Date   • ABDOMINAL HYSTERECTOMY TOTAL     • CHOLECYSTECTOMY     • FOOT SURGERY      left foot reattached       History reviewed. No pertinent family history.    Patient has no known allergies.    Current Outpatient Prescriptions Ordered in [facility]   Medication Sig Dispense Refill   • liothyronine (CYTOMEL) 5 MCG Tab TAKE 2 TABLETS BY MOUTH EVERY MORNING & TAKE 1 TABLET IN THE EVENING  11   • nortriptyline (PAMELOR) 25 MG Cap TAKE 1-2 CAPSULES BY MOUTH EVERY EVENING  11   • Clemastine Fumarate 2.68 MG Tab Take  by mouth 3 times a day.     • baclofen (LIORESAL) 10 MG Tab Take 1 Tab by mouth 3 times a day. 90 Tab 1   • modafinil (PROVIGIL) 200 MG Tab Take 1 Tab by mouth every day for 90 days. 30 Tab 2   • Levothyroxine Sodium (SYNTHROID PO) Take 0.175 mcg by mouth.     • TEMAZEPAM PO Take  by mouth.     • NALTREXONE HCL PO 3.5 Capsule.  6   • SYNTHROID 150 MCG  "Tab Take 150 mcg by mouth every day.  11   • LIOTRIX, T3-T4, PO Take  by mouth.       No current Epic-ordered facility-administered medications on file.        Constitutional ROS: No unexpected change in weight, No weakness, No unexplained fevers, sweats, or chills  Pulmonary ROS: No chronic cough, sputum, or hemoptysis, No shortness of breath, No recent change in breathing  Cardiovascular ROS: No chest pain, No edema, No palpitations  Gastrointestinal ROS: No abdominal pain, No nausea, vomiting, diarrhea, or constipation  Musculoskeletal/Extremities ROS: Positive per HPI  Neurologic ROS: Positive per HPI  Endocrine ROS: Positive per HPI    Physical exam:  /78 (BP Location: Left arm, Patient Position: Sitting, BP Cuff Size: Adult)   Pulse 78   Temp 36.8 °C (98.3 °F) (Temporal)   Resp 16   Ht 1.626 m (5' 4\")   Wt 88.5 kg (195 lb)   SpO2 100%   BMI 33.47 kg/m²   General Appearance: Very pleasant middle-aged female, alert, no distress, obese, well-groomed  Skin: Skin color, texture, turgor normal. No rashes or lesions.  Lungs: negative findings: normal respiratory rate and rhythm, lungs clear to auscultation  Heart: negative. RRR without murmur, gallop, or rubs.  No ectopy.  Abdomen: Abdomen soft, non-tender. BS normal. No masses,  No organomegaly  Musculoskeletal: negative findings: no evidence of joint instability, no evidence of muscle atrophy, no deformities present  Neurologic: intact, oriented, mood appropriate, judgment intact.  Cranial nerves II through XII grossly intact    Medical decision making/discussion: Patient will have routine fasting labs done before she follows up with me in August.  She is to continue care per her specialists including endocrinology, will put new referral into neurology.  Her medications have been temporarily filled until she can get in with specialist this includes modafinil as well as baclofen.    Heaven was seen today for new patient.    Diagnoses and all orders for " this visit:    Multiple sclerosis (HCC)  -     REFERRAL TO NEUROLOGY  -     CBC WITH DIFFERENTIAL; Future  -     baclofen (LIORESAL) 10 MG Tab; Take 1 Tab by mouth 3 times a day.  -     modafinil (PROVIGIL) 200 MG Tab; Take 1 Tab by mouth every day for 90 days.    Postoperative hypothyroidism    Eczema, unspecified type    Screening for cardiovascular condition  -     Comp Metabolic Panel; Future  -     Lipid Profile; Future    Other orders  -     Obtain Results: Colonoscopy; Obtain Results From: [facility]          Please note that this dictation was created using voice recognition software. I have made every reasonable attempt to correct obvious errors, but I expect that there are errors of grammar and possibly content that I did not discover before finalizing the note.

## 2019-03-12 PROBLEM — G35 MULTIPLE SCLEROSIS (HCC): Status: ACTIVE | Noted: 2019-03-12

## 2019-03-12 PROBLEM — E89.0 POSTOPERATIVE HYPOTHYROIDISM: Status: ACTIVE | Noted: 2019-03-12

## 2019-03-12 PROBLEM — L30.9 ECZEMA: Status: ACTIVE | Noted: 2019-03-12

## 2019-03-12 NOTE — ASSESSMENT & PLAN NOTE
This is chronic and stable, she is on clemastine which seems to be controlling her eczema well.  She does not need refills on this.

## 2019-03-12 NOTE — ASSESSMENT & PLAN NOTE
This is a chronic and stable condition, this is secondary to thyroidectomy, she had benign mass that was removed.  She is on levothyroxine as well as liothyronine.  She is closely followed by an endocrinologist, she does not need new referral.

## 2019-03-12 NOTE — ASSESSMENT & PLAN NOTE
Patient has history of multiple sclerosis for which she is on baclofen and modafinil, usually followed by neurology but she needs new referral as her current neurologist is very difficult to get into.  Referral has been placed we will give her a temporary refill on the baclofen and the modafinil.  Reports this condition to be in stable.

## 2019-03-15 ENCOUNTER — OFFICE VISIT (OUTPATIENT)
Dept: URGENT CARE | Facility: PHYSICIAN GROUP | Age: 54
End: 2019-03-15
Payer: COMMERCIAL

## 2019-03-15 VITALS
RESPIRATION RATE: 16 BRPM | TEMPERATURE: 98.6 F | OXYGEN SATURATION: 98 % | WEIGHT: 193 LBS | HEART RATE: 68 BPM | BODY MASS INDEX: 33.13 KG/M2 | SYSTOLIC BLOOD PRESSURE: 138 MMHG | DIASTOLIC BLOOD PRESSURE: 78 MMHG

## 2019-03-15 DIAGNOSIS — M62.838 MUSCLE SPASM: ICD-10-CM

## 2019-03-15 PROCEDURE — 99214 OFFICE O/P EST MOD 30 MIN: CPT | Performed by: FAMILY MEDICINE

## 2019-03-15 RX ORDER — TRIAMCINOLONE ACETONIDE 40 MG/ML
60 INJECTION, SUSPENSION INTRA-ARTICULAR; INTRAMUSCULAR ONCE
Status: COMPLETED | OUTPATIENT
Start: 2019-03-15 | End: 2019-03-15

## 2019-03-15 RX ORDER — KETOROLAC TROMETHAMINE 30 MG/ML
60 INJECTION, SOLUTION INTRAMUSCULAR; INTRAVENOUS ONCE
Status: COMPLETED | OUTPATIENT
Start: 2019-03-15 | End: 2019-03-15

## 2019-03-15 RX ORDER — TEMAZEPAM 15 MG/1
15 CAPSULE ORAL NIGHTLY PRN
COMMUNITY
End: 2020-07-21 | Stop reason: SDUPTHER

## 2019-03-15 RX ADMIN — KETOROLAC TROMETHAMINE 60 MG: 30 INJECTION, SOLUTION INTRAMUSCULAR; INTRAVENOUS at 14:42

## 2019-03-15 RX ADMIN — TRIAMCINOLONE ACETONIDE 60 MG: 40 INJECTION, SUSPENSION INTRA-ARTICULAR; INTRAMUSCULAR at 14:43

## 2019-03-15 NOTE — PROGRESS NOTES
Chief Complaint:    Chief Complaint   Patient presents with   • Muscle Spasms     MS-lump on leg and neck from spasms       History of Present Illness:    This is a new problem. She is having spasms in left leg and left upper back and neck that started on 3/12/19. At least moderate severity and not getting better. No injury or trauma to have caused this. She has Multiple Sclerosis and attributes spasms to MS as she will occasionally get muscle spasms related to MS. She reports Toradol IM given 12/18/16 for back spasms only helped a little. Flexeril rx'd 12/18/16 might of helped but made her very sleepy. She was rx'd Baclofen 10 mg TID #90, 1 RF on 3/11/19 but reports she did not pick it up because pharmacy never contacted her it was ready.      Review of Systems:    Constitutional: Negative for fever, chills, and diaphoresis.   Eyes: Negative for change in vision, photophobia, pain, redness, and discharge.  ENT: Negative for ear pain, ear discharge, hearing loss, tinnitus, nasal congestion, nosebleeds, and sore throat.    Respiratory: Negative for cough, hemoptysis, sputum production, shortness of breath, wheezing, and stridor.    Cardiovascular: Negative for chest pain, palpitations, orthopnea, claudication, leg swelling, and PND.   Gastrointestinal: Negative for abdominal pain, nausea, vomiting, diarrhea, constipation, blood in stool, and melena.   Genitourinary: Negative for dysuria, urinary urgency, urinary frequency, hematuria, and flank pain.   Musculoskeletal: See HPI.   Skin: Negative for rash and itching.   Neurological: History of Multiple Sclerosis. Negative for dizziness, tingling, tremors, sensory change, speech change, focal weakness, seizures, loss of consciousness, and headaches.   Endo: Hypothyroid, on medication.   Heme: Does not bruise/bleed easily.   Psychiatric/Behavioral: Negative for depression, suicidal ideas, hallucinations, memory loss and substance abuse. The patient is not nervous/anxious  and does not have insomnia.        Past Medical History:    Past Medical History:   Diagnosis Date   • Hashimoto's disease 2004   • MS (multiple sclerosis) (HCC) 2007     Past Surgical History:    Past Surgical History:   Procedure Laterality Date   • ABDOMINAL HYSTERECTOMY TOTAL     • CHOLECYSTECTOMY     • FOOT SURGERY      left foot reattached     Social History:    Social History     Social History   • Marital status:      Spouse name: N/A   • Number of children: N/A   • Years of education: N/A     Occupational History   • Not on file.     Social History Main Topics   • Smoking status: Former Smoker     Types: Cigarettes     Quit date: 2000   • Smokeless tobacco: Never Used   • Alcohol use Yes      Comment: Occasionally   • Drug use: No   • Sexual activity: Yes     Other Topics Concern   • Not on file     Social History Narrative   • No narrative on file     Family History:    History reviewed. No pertinent family history.    Medications:    Current Outpatient Prescriptions on File Prior to Visit   Medication Sig Dispense Refill   • NALTREXONE HCL PO 3.5 Capsule.  6   • SYNTHROID 150 MCG Tab Take 150 mcg by mouth every day.  11   • liothyronine (CYTOMEL) 5 MCG Tab TAKE 2 TABLETS BY MOUTH EVERY MORNING & TAKE 1 TABLET IN THE EVENING  11   • nortriptyline (PAMELOR) 25 MG Cap TAKE 1-2 CAPSULES BY MOUTH EVERY EVENING  11   • Clemastine Fumarate 2.68 MG Tab Take  by mouth 3 times a day.     • baclofen (LIORESAL) 10 MG Tab Take 1 Tab by mouth 3 times a day. (Patient not taking: Reported on 3/15/2019) 90 Tab 1   • modafinil (PROVIGIL) 200 MG Tab Take 1 Tab by mouth every day for 90 days. (Patient not taking: Reported on 3/15/2019) 30 Tab 2     No current facility-administered medications on file prior to visit.      Temazepam 15 mg    Allergies:    No Known Allergies      Vitals:    Vitals:    03/15/19 1353   BP: 138/78   Pulse: 68   Resp: 16   Temp: 37 °C (98.6 °F)   SpO2: 98%   Weight: 87.5 kg (193 lb)        Physical Exam:    Constitutional: Vital signs reviewed. Appears well-developed and well-nourished. No acute distress.   Eyes: Sclera white, conjunctivae clear.  ENT: External ears normal. Hearing normal.  Pulmonary/Chest: Respirations non-labored.  Musculoskeletal: Pain in left thigh and left neck and left upper back with movements.  Neurological: Alert and oriented to person, place, and time. Muscle tone normal. Coordination normal. Light touch and sensation normal.   Skin: No rashes or lesions. Warm, dry, normal turgor.  Psychiatric: Normal mood and affect. Behavior is normal. Judgment and thought content normal.       Assessment / Plan:    1. Muscle spasm  - ketorolac (TORADOL) injection 60 mg; 2 mL by Intramuscular route Once.  - triamcinolone acetonide (KENALOG-40) injection 60 mg; 1.5 mL by Intramuscular route Once.      Work note given - excuse for 3/14 and 3/15/19.    Discussed with her DDX, management options, and risks, benefits, and alternatives to treatment plan agreed upon.    She is willing to try Toradol IM today for current symptoms.    She reports she has been treated with systemic steroids in past for MS flares, but the IV steroids seemed to help much better than oral steroids. She also reports some swallowing issues and concerned about having to swallow oral steroids. She is willing to try IM steroid for current symptoms.    Agreeable to medications given for potent anti-inflammatory treatment.     She will also check with pharmacy about Baclofen rx'd 3/11/19 and start using this for muscle relaxer prn.    Discussed expected course of duration, time for improvement, and to seek follow-up in Emergency Room, urgent care, or with PCP if getting worse at any time or not improving within expected time frame.

## 2019-03-15 NOTE — LETTER
March 15, 2019         Patient: Heaven Chavis   YOB: 1965   Date of Visit: 3/15/2019           To Whom it May Concern:    Heaven Chavis was seen in my clinic on 3/15/2019.     Please excuse from work for 3/14 and 3/15/19 due to medical condition.    If you have any questions or concerns, please don't hesitate to call.        Sincerely,           Dallas Robertson M.D.  Electronically Signed

## 2019-03-20 ENCOUNTER — TELEPHONE (OUTPATIENT)
Dept: MEDICAL GROUP | Facility: PHYSICIAN GROUP | Age: 54
End: 2019-03-20

## 2019-03-20 ENCOUNTER — OFFICE VISIT (OUTPATIENT)
Dept: URGENT CARE | Facility: PHYSICIAN GROUP | Age: 54
End: 2019-03-20
Payer: COMMERCIAL

## 2019-03-20 VITALS
HEART RATE: 71 BPM | TEMPERATURE: 98.1 F | BODY MASS INDEX: 32.79 KG/M2 | SYSTOLIC BLOOD PRESSURE: 106 MMHG | DIASTOLIC BLOOD PRESSURE: 74 MMHG | WEIGHT: 191 LBS | RESPIRATION RATE: 14 BRPM | OXYGEN SATURATION: 94 %

## 2019-03-20 DIAGNOSIS — M62.830 MUSCLE SPASM OF BACK: ICD-10-CM

## 2019-03-20 DIAGNOSIS — G35 H/O MULTIPLE SCLEROSIS (HCC): ICD-10-CM

## 2019-03-20 DIAGNOSIS — M54.42 ACUTE RIGHT-SIDED LOW BACK PAIN WITH LEFT-SIDED SCIATICA: ICD-10-CM

## 2019-03-20 PROCEDURE — 99214 OFFICE O/P EST MOD 30 MIN: CPT | Performed by: NURSE PRACTITIONER

## 2019-03-20 RX ORDER — METHYLPREDNISOLONE 4 MG/1
TABLET ORAL
Qty: 1 KIT | Refills: 0 | Status: SHIPPED | OUTPATIENT
Start: 2019-03-20 | End: 2019-11-21

## 2019-03-20 ASSESSMENT — ENCOUNTER SYMPTOMS
NAUSEA: 0
BRUISES/BLEEDS EASILY: 0
SHORTNESS OF BREATH: 0
FEVER: 0
FALLS: 0
HEADACHES: 0
TINGLING: 1
VOMITING: 0
ORTHOPNEA: 0
PALPITATIONS: 0
WEAKNESS: 0
ABDOMINAL PAIN: 0
DIZZINESS: 0
BACK PAIN: 1
MYALGIAS: 1
CHILLS: 0
SENSORY CHANGE: 1

## 2019-03-20 NOTE — TELEPHONE ENCOUNTER
Patient brought in paperwork for FMLA. She was under the care of an Neurologist but she has not seen them due to the fact the the providers is not in network. Paperwork is scanned with documentation of old Neurologist, and copy in basket

## 2019-03-20 NOTE — PROGRESS NOTES
Subjective:      Heaven Chavis is a 53 y.o. female who presents with Back Pain (Lower Left Back pain )            HPI  Heaven is here for lower back pain that radiates down left leg. Seen 5 days ago for this. Was given Toradol and Kenalog injection and states has lasted a couple of days and is still having muscle pain. H/o Multiple Sclerosis. Awaiting for approval of her Provigil, out of this med. Increased fatigue and difficulty with sleep. Has neurologist but only sees intermittently since now out of network.     PMH:  has a past medical history of Hashimoto's disease (2004) and MS (multiple sclerosis) ([facility]) (2007).  MEDS:   Current Outpatient Prescriptions:   •  MethylPREDNISolone (MEDROL DOSEPAK) 4 MG Tablet Therapy Pack, Use as directed, Disp: 1 Kit, Rfl: 0  •  temazepam (RESTORIL) 15 MG Cap, Take 15 mg by mouth at bedtime as needed for Sleep., Disp: , Rfl:   •  NALTREXONE HCL PO, 3.5 Capsule., Disp: , Rfl: 6  •  SYNTHROID 150 MCG Tab, Take 150 mcg by mouth every day., Disp: , Rfl: 11  •  liothyronine (CYTOMEL) 5 MCG Tab, TAKE 2 TABLETS BY MOUTH EVERY MORNING & TAKE 1 TABLET IN THE EVENING, Disp: , Rfl: 11  •  nortriptyline (PAMELOR) 25 MG Cap, TAKE 1-2 CAPSULES BY MOUTH EVERY EVENING, Disp: , Rfl: 11  •  Clemastine Fumarate 2.68 MG Tab, Take  by mouth 3 times a day., Disp: , Rfl:   •  baclofen (LIORESAL) 10 MG Tab, Take 1 Tab by mouth 3 times a day. (Patient not taking: Reported on 3/20/2019), Disp: 90 Tab, Rfl: 1  •  modafinil (PROVIGIL) 200 MG Tab, Take 1 Tab by mouth every day for 90 days. (Patient not taking: Reported on 3/20/2019), Disp: 30 Tab, Rfl: 2  ALLERGIES: No Known Allergies  SURGHX:   Past Surgical History:   Procedure Laterality Date   • ABDOMINAL HYSTERECTOMY TOTAL     • CHOLECYSTECTOMY     • FOOT SURGERY      left foot reattached     SOCHX:  reports that she quit smoking about 19 years ago. Her smoking use included Cigarettes. She has never used smokeless tobacco. She reports that she drinks  alcohol. She reports that she does not use drugs.  FH: Family history was reviewed, no pertinent findings to report    Review of Systems   Constitutional: Negative for chills, fever and malaise/fatigue.   Respiratory: Negative for shortness of breath.    Cardiovascular: Negative for chest pain, palpitations, orthopnea and leg swelling.   Gastrointestinal: Negative for abdominal pain, nausea and vomiting.   Genitourinary: Negative for dysuria, frequency and urgency.   Musculoskeletal: Positive for back pain and myalgias. Negative for falls and joint pain.   Skin: Negative for itching and rash.   Neurological: Positive for tingling and sensory change. Negative for dizziness, weakness and headaches.   Endo/Heme/Allergies: Does not bruise/bleed easily.   All other systems reviewed and are negative.         Objective:     /74   Pulse 71   Temp 36.7 °C (98.1 °F) (Temporal)   Resp 14   Wt 86.6 kg (191 lb)   SpO2 94%   BMI 32.79 kg/m²      Physical Exam   Constitutional: She is oriented to person, place, and time. She appears well-developed and well-nourished. She is active and cooperative.  Non-toxic appearance. She does not have a sickly appearance. She does not appear ill. No distress.   HENT:   Head: Normocephalic.   Eyes: Pupils are equal, round, and reactive to light. EOM are normal.   Neck: Normal range of motion. Neck supple.   Cardiovascular: Normal rate and regular rhythm.    Pulmonary/Chest: Effort normal and breath sounds normal.   Musculoskeletal: She exhibits no edema or deformity.        Lumbar back: She exhibits decreased range of motion, tenderness, pain and spasm. She exhibits no bony tenderness, no swelling, no edema and no deformity.        Back:    Neurological: She is alert and oriented to person, place, and time.   Skin: Skin is warm and dry. No rash noted. She is not diaphoretic. No erythema.   Vitals reviewed.              Assessment/Plan:     1. H/O multiple sclerosis    -  MethylPREDNISolone (MEDROL DOSEPAK) 4 MG Tablet Therapy Pack; Use as directed  Dispense: 1 Kit; Refill: 0  - REFERRAL TO NEUROLOGY    2. Acute right-sided low back pain with left-sided sciatica      3. Muscle spasm of back    Has baclofen but cannot work on this   Take NSAID prn for discomfort  May use cool compresses for swelling and warm compresses for muscle stiffness   May perform muscle stretches as tolerated after warm compresses to maintain mobility  May apply topical analgesics prn  Perform proper body mechanics with lifting, twisting, bending and walking. Ask for assistance with heavy objects prn  Monitor for bowel/urination problems, numbness/tingling in extremities, decreased ROM with ambulation difficulty- need re-evaluation  F/u with new neurologist  MA of Natalie Castanon to look into Provigil Rx, MA spoke to patient directly about this at d/c

## 2019-03-21 ENCOUNTER — TELEPHONE (OUTPATIENT)
Dept: MEDICAL GROUP | Facility: PHYSICIAN GROUP | Age: 54
End: 2019-03-21

## 2019-03-21 NOTE — TELEPHONE ENCOUNTER
MEDICATION PRIOR AUTHORIZATION NEEDED:    1. Name of Medication: Provigil    2. Requested By (Name of Pharmacy): [facility]     3. Is insurance on file current? yes    4. What is the name & phone number of the 3rd party payor? [employer]       Key:[id number]

## 2019-03-27 ENCOUNTER — HOSPITAL ENCOUNTER (OUTPATIENT)
Dept: RADIOLOGY | Facility: MEDICAL CENTER | Age: 54
End: 2019-03-27

## 2019-03-27 ENCOUNTER — TELEMEDICINE2 (OUTPATIENT)
Dept: URGENT CARE | Facility: PHYSICIAN GROUP | Age: 54
End: 2019-03-27
Payer: COMMERCIAL

## 2019-03-27 DIAGNOSIS — J06.9 UPPER RESPIRATORY TRACT INFECTION, UNSPECIFIED TYPE: ICD-10-CM

## 2019-03-27 PROCEDURE — 99213 OFFICE O/P EST LOW 20 MIN: CPT | Performed by: PHYSICIAN ASSISTANT

## 2019-03-27 RX ORDER — DEXTROMETHORPHAN HYDROBROMIDE AND PROMETHAZINE HYDROCHLORIDE 15; 6.25 MG/5ML; MG/5ML
SYRUP ORAL
Qty: 180 ML | Refills: 0 | Status: SHIPPED | OUTPATIENT
Start: 2019-03-27 | End: 2019-11-21

## 2019-03-27 ASSESSMENT — ENCOUNTER SYMPTOMS
VOMITING: 0
HEADACHES: 0
NAUSEA: 0
SWOLLEN GLANDS: 0
NECK PAIN: 0
COUGH: 1
WHEEZING: 0
SINUS PAIN: 0
SORE THROAT: 1
RHINORRHEA: 0
ABDOMINAL PAIN: 0

## 2019-03-27 NOTE — LETTER
March 27, 2019         Patient: Heaven Chavis   YOB: 1965   Date of Visit: 3/27/2019           To Whom it May Concern:    Heaven Chavis was seen in my clinic on 3/27/2019. Please excuse from work on 3/26, 3/27 and 3/28/19.    If you have any questions or concerns, please don't hesitate to call.        Sincerely,           Lias Reich P.A.-C.  Electronically Signed

## 2019-03-27 NOTE — TELEPHONE ENCOUNTER
FINAL PRIOR AUTHORIZATION STATUS:    1.  Name of Medication & Dose: provigil     2. Prior Auth Status: Approved through approved x 1 year     3. Action Taken: Pharmacy Notified: yes Patient Notified: yes

## 2019-03-27 NOTE — PROGRESS NOTES
Subjective:      Heaven Chavis is a 53 y.o. female who presents with Sore Throat (had flu like symptoms yesterday, scratchy throat today )            This is a telemedicine visit.       URI    This is a new problem. The current episode started yesterday. The problem has been unchanged. There has been no fever. Associated symptoms include congestion, coughing and a sore throat. Pertinent negatives include no abdominal pain, chest pain, ear pain, headaches, joint pain, joint swelling, nausea, neck pain, plugged ear sensation, rash, rhinorrhea, sinus pain, sneezing, swollen glands, vomiting or wheezing. She has tried nothing for the symptoms. The treatment provided no relief.     Pmh: MS  Allergies: knda    Review of Systems   HENT: Positive for congestion and sore throat. Negative for ear pain, rhinorrhea, sinus pain and sneezing.    Respiratory: Positive for cough. Negative for wheezing.    Cardiovascular: Negative for chest pain.   Gastrointestinal: Negative for abdominal pain, nausea and vomiting.   Musculoskeletal: Negative for joint pain and neck pain.   Skin: Negative for rash.   Neurological: Negative for headaches.          Objective:     There were no vitals taken for this visit.     Physical Exam       Vitals were not done as this is a telemedicine visit  General: She appears well nourished, well hydrated, nontoxic, nondyspneic  HEENT: Appearance appears fine  Neuro: Intac  t musculoskeletal: Appears like she has full range of motion of her upper extremities     Assessment/Plan:     1. Upper respiratory tract infection, unspecified type  Discussed that symptoms seem consistent with viral etiology.  2-day history of sore throat and cough.  Denies shortness of breath or fevers.  Did discuss limited nature of telemedicine visit.  Recommend if symptoms persist or worsen to come into the clinic so we can listen to her heart and lungs and possibly run some tests as needed.  We will send cough medicine to the  pharmacy.  Follow-up as needed    - promethazine-dextromethorphan (PROMETHAZINE-DM) 6.25-15 MG/5ML syrup; Take 5mls every six hours as needed for cough  Dispense: 180 mL; Refill: 0

## 2019-03-28 ENCOUNTER — OFFICE VISIT (OUTPATIENT)
Dept: URGENT CARE | Facility: PHYSICIAN GROUP | Age: 54
End: 2019-03-28
Payer: COMMERCIAL

## 2019-03-28 VITALS
BODY MASS INDEX: 32.61 KG/M2 | HEIGHT: 64 IN | SYSTOLIC BLOOD PRESSURE: 106 MMHG | OXYGEN SATURATION: 97 % | RESPIRATION RATE: 16 BRPM | DIASTOLIC BLOOD PRESSURE: 70 MMHG | WEIGHT: 191 LBS | HEART RATE: 74 BPM | TEMPERATURE: 98.4 F

## 2019-03-28 DIAGNOSIS — J06.9 VIRAL URI WITH COUGH: ICD-10-CM

## 2019-03-28 PROCEDURE — 99213 OFFICE O/P EST LOW 20 MIN: CPT | Performed by: FAMILY MEDICINE

## 2019-03-28 ASSESSMENT — ENCOUNTER SYMPTOMS
ABDOMINAL PAIN: 0
VOMITING: 0
SORE THROAT: 1
COUGH: 1
SHORTNESS OF BREATH: 0
DIARRHEA: 0
FEVER: 0
NAUSEA: 0

## 2019-03-28 NOTE — LETTER
March 28, 2019      To Whom It May Concern:           This is confirmation that Heaven Chavis attended her scheduled appointment with Sammy Bain M.D. on 3/28/19.  Please excuse her from work. She is anticipated to be able to return to work on 4/1/19.             If you have any questions please do not hesitate to call me at the phone number listed below.    Sincerely,          Sammy Bain M.D.  [phone]

## 2019-03-28 NOTE — PROGRESS NOTES
Subjective:     Heaven Chavis is a 53 y.o. female who presents for Cough (loss of voice )    HPI  Pt presents for follow-up of cough  Pt with illness for the past 3 days   Pt with a cough, lost her voice, and feeling fatigued   Patient was evaluated via a telemedicine encounter yesterday  Was diagnosed with viral URI and given prescription cough medication  Patient continues to have cough, though feels the medication has helped  Feeling more fatigued today and wanted to be checked in person to make sure no pneumonia  No fevers at home    Review of Systems   Constitutional: Negative for fever.   HENT: Positive for congestion and sore throat.    Respiratory: Positive for cough. Negative for shortness of breath.    Cardiovascular: Negative for chest pain.   Gastrointestinal: Negative for abdominal pain, diarrhea, nausea and vomiting.   Skin: Negative for rash.       PMH:  has a past medical history of Hashimoto's disease (2004) and MS (multiple sclerosis) ([facility]) (2007).  MEDS:   Current Outpatient Prescriptions:   •  promethazine-dextromethorphan (PROMETHAZINE-DM) 6.25-15 MG/5ML syrup, Take 5mls every six hours as needed for cough, Disp: 180 mL, Rfl: 0  •  MethylPREDNISolone (MEDROL DOSEPAK) 4 MG Tablet Therapy Pack, Use as directed, Disp: 1 Kit, Rfl: 0  •  temazepam (RESTORIL) 15 MG Cap, Take 15 mg by mouth at bedtime as needed for Sleep., Disp: , Rfl:   •  NALTREXONE HCL PO, 3.5 Capsule., Disp: , Rfl: 6  •  SYNTHROID 150 MCG Tab, Take 150 mcg by mouth every day., Disp: , Rfl: 11  •  liothyronine (CYTOMEL) 5 MCG Tab, TAKE 2 TABLETS BY MOUTH EVERY MORNING & TAKE 1 TABLET IN THE EVENING, Disp: , Rfl: 11  •  nortriptyline (PAMELOR) 25 MG Cap, TAKE 1-2 CAPSULES BY MOUTH EVERY EVENING, Disp: , Rfl: 11  •  Clemastine Fumarate 2.68 MG Tab, Take  by mouth 3 times a day., Disp: , Rfl:   •  baclofen (LIORESAL) 10 MG Tab, Take 1 Tab by mouth 3 times a day., Disp: 90 Tab, Rfl: 1  •  modafinil (PROVIGIL) 200 MG Tab, Take 1 Tab by  "mouth every day for 90 days., Disp: 30 Tab, Rfl: 2  ALLERGIES: No Known Allergies  SURGHX:   Past Surgical History:   Procedure Laterality Date   • ABDOMINAL HYSTERECTOMY TOTAL     • CHOLECYSTECTOMY     • FOOT SURGERY      left foot reattached     SOCHX:  reports that she quit smoking about 19 years ago. Her smoking use included Cigarettes. She has never used smokeless tobacco. She reports that she drinks alcohol. She reports that she does not use drugs.  FH: Family history was reviewed, not contributing to acute illness     Objective:   /70   Pulse 74   Temp 36.9 °C (98.4 °F) (Temporal)   Resp 16   Ht 1.626 m (5' 4\")   Wt 86.6 kg (191 lb)   SpO2 97%   BMI 32.79 kg/m²     Physical Exam   Constitutional: She appears well-developed and well-nourished. No distress.   HENT:   Head: Normocephalic and atraumatic.   Right Ear: Tympanic membrane, external ear and ear canal normal.   Left Ear: Tympanic membrane, external ear and ear canal normal.   Nose: Mucosal edema and rhinorrhea present.   Mouth/Throat: Uvula is midline, oropharynx is clear and moist and mucous membranes are normal.   Eyes: Conjunctivae and EOM are normal. Right eye exhibits no discharge. Left eye exhibits no discharge. No scleral icterus.   Neck: Normal range of motion. No tracheal deviation present.   Cardiovascular: Normal rate and regular rhythm.    Pulmonary/Chest: Effort normal and breath sounds normal. No respiratory distress. She has no wheezes. She has no rales.   Hoarse voice   Neurological: She is alert. Coordination normal.   Skin: Skin is warm and dry. No rash noted. She is not diaphoretic.   Psychiatric: She has a normal mood and affect. Her behavior is normal. Judgment and thought content normal.       Assessment/Plan:   Assessment    1. Viral URI with cough  Patient is a 53-year-old female with viral URI.  Her chief complaint is loss of voice.  Unfortunately, advised that no medication will help her voice and needs to heal on " its own.  Steroids occasionally can be helpful, however due to her MS, would not recommend steroids for this indication.  Reviewed supportive care measures and expected course of illness.  Patient will follow-up on an as-needed basis.

## 2019-04-01 ENCOUNTER — HOSPITAL ENCOUNTER (OUTPATIENT)
Dept: RADIOLOGY | Facility: MEDICAL CENTER | Age: 54
End: 2019-04-01
Attending: NURSE PRACTITIONER
Payer: COMMERCIAL

## 2019-04-01 DIAGNOSIS — Z12.31 BREAST CANCER SCREENING BY MAMMOGRAM: ICD-10-CM

## 2019-04-01 PROCEDURE — 77063 BREAST TOMOSYNTHESIS BI: CPT

## 2019-04-12 ENCOUNTER — OFFICE VISIT (OUTPATIENT)
Dept: URGENT CARE | Facility: PHYSICIAN GROUP | Age: 54
End: 2019-04-12
Payer: COMMERCIAL

## 2019-04-12 VITALS
RESPIRATION RATE: 16 BRPM | WEIGHT: 193.4 LBS | OXYGEN SATURATION: 98 % | SYSTOLIC BLOOD PRESSURE: 132 MMHG | BODY MASS INDEX: 33.2 KG/M2 | TEMPERATURE: 98 F | DIASTOLIC BLOOD PRESSURE: 78 MMHG | HEART RATE: 70 BPM

## 2019-04-12 DIAGNOSIS — J39.2 THROAT IRRITATION: ICD-10-CM

## 2019-04-12 PROCEDURE — 99214 OFFICE O/P EST MOD 30 MIN: CPT | Performed by: PHYSICIAN ASSISTANT

## 2019-04-12 NOTE — PROGRESS NOTES
Chief Complaint   Patient presents with   • Pharyngitis     productive cough are back from recent visit to         HISTORY OF PRESENT ILLNESS: Patient is a 53 y.o. female who presents today for the following:    Patient comes in for evaluation of persistent throat irritation.  She was seen 3/28/19 for a cough and hoarse voice.  She had had symptoms for approximately 3 days prior to that visit.  She states the cough is only occasionally but continues to have hoarseness in her voice.  She states she feels like there is an irritation in her throat that makes her cough and the cough is worsening her voice.  She works as a  and needs her voice for work.  She was given steroids and cough syrup during her last visit.  Overall she feels that she is improving but has been unable to improve her voice.    Patient Active Problem List    Diagnosis Date Noted   • Multiple sclerosis (HCC) 03/12/2019   • Postoperative hypothyroidism 03/12/2019   • Eczema 03/12/2019       Allergies:Patient has no known allergies.    Current Outpatient Prescriptions Ordered in [employer]   Medication Sig Dispense Refill   • maalox plus-benadryl-visc lidocaine (MAGIC MOUTHWASH) Take 5 mL by mouth every 6 hours as needed (throat irritation). 1 Bottle 0   • promethazine-dextromethorphan (PROMETHAZINE-DM) 6.25-15 MG/5ML syrup Take 5mls every six hours as needed for cough 180 mL 0   • MethylPREDNISolone (MEDROL DOSEPAK) 4 MG Tablet Therapy Pack Use as directed 1 Kit 0   • temazepam (RESTORIL) 15 MG Cap Take 15 mg by mouth at bedtime as needed for Sleep.     • NALTREXONE HCL PO 3.5 Capsule.  6   • SYNTHROID 150 MCG Tab Take 150 mcg by mouth every day.  11   • liothyronine (CYTOMEL) 5 MCG Tab TAKE 2 TABLETS BY MOUTH EVERY MORNING & TAKE 1 TABLET IN THE EVENING  11   • nortriptyline (PAMELOR) 25 MG Cap TAKE 1-2 CAPSULES BY MOUTH EVERY EVENING  11   • Clemastine Fumarate 2.68 MG Tab Take  by mouth 3 times a day.     • baclofen (LIORESAL) 10 MG Tab  Take 1 Tab by mouth 3 times a day. 90 Tab 1   • modafinil (PROVIGIL) 200 MG Tab Take 1 Tab by mouth every day for 90 days. 30 Tab 2     No current Epic-ordered facility-administered medications on file.        Past Medical History:   Diagnosis Date   • Hashimoto's disease 2004   • MS (multiple sclerosis) ([facility]) 2007       Social History   Substance Use Topics   • Smoking status: Former Smoker     Types: Cigarettes     Quit date: 2000   • Smokeless tobacco: Never Used   • Alcohol use Yes      Comment: Occasionally       No family status information on file.   No family history on file.    Review of Systems:   Constitutional ROS: No unexpected change in weight, No weakness, No fatigue  Eye ROS: No recent significant change in vision, No eye pain, redness, discharge  Ear ROS: No drainage, No tinnitus or vertigo, No recent change in hearing  Mouth/Throat ROS: No teeth or gum problems, No bleeding gums, No tongue complaints  Neck ROS: No swollen glands, No significant pain in neck  Pulmonary ROS: No chronic cough, sputum, or hemoptysis, No dyspnea on exertion, No wheezing  Cardiovascular ROS: No diaphoresis, No edema, No palpitations  Gastrointestinal ROS: No change in bowel habits, No significant change in appetite, No nausea, vomiting, diarrhea, or constipation  Musculoskeletal/Extremities ROS: No peripheral edema, No pain, redness or swelling on the joints  Hematologic/Lymphatic ROS: No chills, No night sweats, No weight loss  Skin/Integumentary ROS: No edema, No evidence of rash, No itching      Exam:  /78   Pulse 70   Temp 36.7 °C (98 °F)   Resp 16   Wt 87.7 kg (193 lb 6.4 oz)   SpO2 98%   General: Well developed, well nourished. No distress.  Eye: PERRL/EOMI; conjunctivae clear, lids normal.  ENMT: Lips without lesions, MMM. Oropharynx is clear. Bilateral TMs are within normal limits.  Pulmonary: Unlabored respiratory effort. Lungs clear to auscultation, no wheezes, no rhonchi.  Cardiovascular: Regular  rate and rhythm without murmur.    Neurologic: Grossly nonfocal. No facial asymmetry noted.  Lymph: No cervical lymphadenopathy noted.  Skin: Warm, dry, good turgor. No rashes in visible areas.   Psych: Normal mood. Alert and oriented x3. Judgment and insight is normal.    Assessment/Plan:  Reassured patient this will likely resolve within the next 10-14 days.  Follow-up with primary care for any persistent foreign body sensation in the throat.  We will try viscous lidocaine to see if this improves with throat irritation.  1. Throat irritation  maalox plus-benadryl-visc lidocaine (MAGIC MOUTHWASH)

## 2019-04-12 NOTE — LETTER
April 12, 2019         Patient: Heaven Chavis   YOB: 1965   Date of Visit: 4/12/2019           To Whom it May Concern:    Heaven Chavis was seen in my clinic on 4/12/2019. She may return to work on 4/15/19.    If you have any questions or concerns, please don't hesitate to call.        Sincerely,           Benita Muller P.A.-C.  Electronically Signed

## 2019-06-25 ENCOUNTER — OFFICE VISIT (OUTPATIENT)
Dept: NEUROLOGY | Facility: MEDICAL CENTER | Age: 54
End: 2019-06-25
Payer: COMMERCIAL

## 2019-06-25 VITALS
TEMPERATURE: 98.2 F | SYSTOLIC BLOOD PRESSURE: 122 MMHG | HEART RATE: 73 BPM | WEIGHT: 190 LBS | OXYGEN SATURATION: 92 % | DIASTOLIC BLOOD PRESSURE: 74 MMHG | BODY MASS INDEX: 32.44 KG/M2 | HEIGHT: 64 IN

## 2019-06-25 DIAGNOSIS — G35 MULTIPLE SCLEROSIS (HCC): ICD-10-CM

## 2019-06-25 DIAGNOSIS — G47.26 SHIFT WORK SLEEP DISORDER: ICD-10-CM

## 2019-06-25 DIAGNOSIS — R53.82 CHRONIC FATIGUE: ICD-10-CM

## 2019-06-25 PROCEDURE — 99204 OFFICE O/P NEW MOD 45 MIN: CPT | Performed by: PSYCHIATRY & NEUROLOGY

## 2019-06-25 RX ORDER — MODAFINIL 200 MG/1
200 TABLET ORAL DAILY
Qty: 30 TAB | Refills: 2 | Status: SHIPPED | OUTPATIENT
Start: 2019-06-25 | End: 2019-09-23

## 2019-06-25 RX ORDER — VAGINAL SUPPOSITORY APPLICATOR
EACH MISCELLANEOUS
COMMUNITY
End: 2020-12-10

## 2019-06-25 RX ORDER — BACLOFEN 10 MG/1
10 TABLET ORAL 3 TIMES DAILY
Qty: 90 TAB | Refills: 1 | Status: SHIPPED | OUTPATIENT
Start: 2019-06-25 | End: 2019-09-23

## 2019-06-25 ASSESSMENT — ENCOUNTER SYMPTOMS
FEVER: 0
DIZZINESS: 0
CHILLS: 0
NAUSEA: 0
VOMITING: 0
HEADACHES: 0
BRUISES/BLEEDS EASILY: 1

## 2019-06-25 NOTE — PROGRESS NOTES
CC: Multiple Sclerosis       HPI:    Heaven Chavis is a pleasant 53 y.o. female who presents today in initial neurologic consultation for multiple sclerosis. She is being referred by their primary care provider STEVEN Iverson.    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 She was evaluated by Dr. Patrice Russell who diagnosed her with optic neuritis. She denies any pain in her eye at the time. She was treated with IV steroids and her vision recovered. She was diagnosed based on MRI imaging and lumbar puncture.     She was initially evaluated by Dr. Ita Murphy who started her on Copaxone, but after she started this she developed left facial numbness. She switched to Avonex, but had flu like side effects.  She then switched back to Copaxone and had injection site reactions.    She was switched to low dose Naltrexone and felt this stabilized her clinically. She returned to Dr. Murphy who started her on Tecfidera, but she developed terrible dry mouth and dry eye. She developed excessive bleeding and discontinued the Tecfidera.    She takes nortriptiline which helps with urinary frequency.  Her left leg becomes weaker in the heat.      MS History:  Diagnosed: 2006.  Lumbar puncture: Told her results were positive.  First presentation: Optic neuritis OS.  Disease modifying treatment:    Current: No disease modifying treatment. Takes low-dose naltrexone.   Previous: Avonex through trial at [facility], Copaxone.   Former or other neurologist: Dr. Ita Murphy. Dr. Martinez. Dr. Barnard.       Review of Systems   Constitutional: Positive for malaise/fatigue. Negative for chills and fever.   HENT: Negative for hearing loss.    Respiratory: Negative for shortness of breath.    Cardiovascular: Negative for chest pain.   Gastrointestinal: Negative for nausea and vomiting.   Genitourinary: Positive for frequency.   Skin: Negative for rash.   Neurological: Negative for dizziness and headaches.   Endo/Heme/Allergies:  Bruises/bleeds easily.   All other ROS were negative.        Past Medical History:   Past Medical History:   Diagnosis Date   • MS (multiple sclerosis) (HCC) 2007   • Hashimoto's disease 2004       Past Surgical History:   Past Surgical History:   Procedure Laterality Date   • ABDOMINAL HYSTERECTOMY TOTAL     • CHOLECYSTECTOMY     • FOOT SURGERY      left foot reattached       Social History:   Social History     Social History   • Marital status:      Spouse name: N/A   • Number of children: N/A   • Years of education: N/A     Occupational History   • Not on file.     Social History Main Topics   • Smoking status: Former Smoker     Types: Cigarettes     Quit date: 2000   • Smokeless tobacco: Never Used   • Alcohol use Yes      Comment: Occasionally   • Drug use: No   • Sexual activity: Yes     Other Topics Concern   • Not on file     Social History Narrative   • No narrative on file       Family Hx:   Family History   Problem Relation Age of Onset   • Stroke Mother    • Clotting Disorder Father    • Thyroid Sister    • Thyroid Sister    • Diabetes Sister    • Thyroid Sister    • Thyroid Sister    • Thyroid Sister    • Thyroid Sister        Current Medications:   Current Outpatient Prescriptions:   •  Misc. Devices (VAGINAL SUPPOSITORY APPLICATOR) Misc, by Does not apply route., Disp: , Rfl:   •  modafinil (PROVIGIL) 200 MG Tab, Take 1 Tab by mouth every day for 90 days., Disp: 30 Tab, Rfl: 2  •  baclofen (LIORESAL) 10 MG Tab, Take 1 Tab by mouth 3 times a day for 90 days., Disp: 90 Tab, Rfl: 1  •  temazepam (RESTORIL) 15 MG Cap, Take 15 mg by mouth at bedtime as needed for Sleep., Disp: , Rfl:   •  NALTREXONE HCL PO, 3.5 Capsule., Disp: , Rfl: 6  •  SYNTHROID 150 MCG Tab, Take 150 mcg by mouth every day., Disp: , Rfl: 11  •  liothyronine (CYTOMEL) 5 MCG Tab, TAKE 2 TABLETS BY MOUTH EVERY MORNING & TAKE 1 TABLET IN THE EVENING, Disp: , Rfl: 11  •  nortriptyline (PAMELOR) 25 MG Cap, TAKE 1-2 CAPSULES BY MOUTH  EVERY EVENING, Disp: , Rfl: 11  •  maalox plus-benadryl-visc lidocaine (MAGIC MOUTHWASH), Take 5 mL by mouth every 6 hours as needed (throat irritation). (Patient not taking: Reported on 6/25/2019), Disp: 1 Bottle, Rfl: 0  •  promethazine-dextromethorphan (PROMETHAZINE-DM) 6.25-15 MG/5ML syrup, Take 5mls every six hours as needed for cough (Patient not taking: Reported on 6/25/2019), Disp: 180 mL, Rfl: 0  •  MethylPREDNISolone (MEDROL DOSEPAK) 4 MG Tablet Therapy Pack, Use as directed, Disp: 1 Kit, Rfl: 0  •  Clemastine Fumarate 2.68 MG Tab, Take  by mouth 3 times a day., Disp: , Rfl:     Allergies: No Known Allergies      Physical Exam:     Vitals:    06/25/19 1318   BP: 122/74   Pulse: 73   Temp: 36.8 °C (98.2 °F)   SpO2: 92%       Constitutional: Well-developed, well-nourished, good hygiene. Appears stated age.  Cardiovascular: RRR, with no murmurs, rubs or gallops. No carotid bruits. No peripheral edema, pedal pulses intact.   Respiratory: Lungs CTA B/L, no W/R/R.   Skin: Warm, dry, intact. No rashes observed.  Eyes: Sclera anicteric.  Neurologic:   Mental Status: Awake, alert, oriented x 3.   Speech: Fluent with normal prosody.   Memory: Able to recall 3 words at 1 minute and 5 minutes. Able to recall recent and remote events accurately.    Concentration: Attentive. Able to focus on history and follow multi-step commands.   Fund of Knowledge: Appropriate.   Cranial Nerves:    CN II: PERRL.  Left APD.    CN III, IV, VI: Good eye closure, EOMI.     CN V: Facial sensation intact and symmetric.     CN VII: No facial asymmetry.     CN VIII: Hearing intact.     CN IX and X: Palate elevates symmetrically. Normal gag reflex.    CN XI: Symmetric shoulder shrug.     CN XII: Tongue midline.    Sensory: Intact light touch, vibration and temperature.    Coordination: No evidence of past-pointing on finger to nose testing, no dysdiadochokinesia. Heel to shin intact.    DTR's: 2+ throughout without clonus.    Babinski: Toes  downgoing bilaterally.   Romberg: Negative.   Movements: No tremors observed.   Musculoskeletal:    Strength: 5/5 in upper and lower extremities bilaterally.   Gait: Steady, narrow based.  Difficulty with tandem walking.   Tone: Normal bulk and tone.   Joints: No swelling.     Labs Reviewed:  Results for YAKELIN FORD (MRN [id number]) as of 6/25/2019 12:51   Ref. Range 1/19/2016 09:38   Vitamin B12 -True Cobalamin Latest Ref Range: 211 - 911 pg/mL 564   Ferritin Latest Ref Range: 10.0 - 291.0 ng/mL 3.4 (L)   Cortisol Latest Ref Range: 0.0 - 23.0 ug/dL 2.8   Homocysteine Latest Ref Range: <=10 umol/L 8   25-Hydroxy   Vitamin D 25 Latest Ref Range: 30 - 100 ng/mL 37   Ige, Qn Latest Ref Range: <=214 kU/L <2   TSH Latest Ref Range: 0.300 - 3.700 uIU/mL 0.160 (L)   Thyroxine -T4 Latest Ref Range: 4.0 - 12.0 ug/dL 7.3   Free T-4 Latest Ref Range: 0.53 - 1.43 ng/dL 0.84   T3,Free Latest Ref Range: 2.4 - 4.2 pg/mL 2.3 (L)   [place], IgE Latest Ref Range: <=0.34 kU/L <0.10   F138 Avocado Latest Ref Range: <=0.34 kU/L <0.10   F204 Banana Latest Ref Range: <=0.34 kU/L <0.10   Celery Latest Ref Range: <=0.34 kU/L <0.10   Coconut Latest Ref Range: <=0.34 kU/L <0.10   F1 Eggwhite Latest Ref Range: <=0.34 kU/L <0.10   Gluten IgG Latest Ref Range: 0.00 - 47.40 mcg/mL 5.96   Grape  F259 Latest Ref Range: <=0.34 kU/L <0.10   Immunocap Score Unknown See Note   F84 Kiwi Fruit Latest Ref Range: <=0.34 kU/L <0.10   F2 Milk Latest Ref Range: <=0.34 kU/L <0.10   Oat Latest Ref Range: <=0.34 kU/L <0.10   Papaya Latest Ref Range: <=0.34 kU/L <0.10   F013 Peanut Latest Ref Range: <=0.34 kU/L <0.10   Pecan Latest Ref Range: <=0.34 kU/L <0.10   Potato Latest Ref Range: <=0.34 kU/L <0.10   Sesame Seed Latest Ref Range: <=0.34 kU/L <0.10   F14 Bean Soybean Latest Ref Range: <=0.34 kU/L <0.10   F299 [place] Latest Ref Range: <=0.34 kU/L <0.10   Tomato IgE Latest Ref Range: <=0.34 kU/L <0.10   Watermelon Latest Ref Range: <=0.34 kU/L <0.10    F4 Wheat Latest Ref Range: <=0.34 kU/L <0.10   9-o-Gzkdcbuhlhlaysrjzzl Latest Ref Range: 24.0 - 208.0 pg/mL 201.0   Free Testosterone Latest Units: pg/mL 2   Sex Hormone Bind Globulin Latest Ref Range: 30 - 135 nmol/L 162 (H)   Testosterone % Free Latest Units: % 0.5   Testosterone,Total Latest Units: ng/dL 35   Antinuclear Antibody Latest Ref Range: None Detected  Detected (A)   FALLON Titer Latest Ref Range: <1:40  1:160 (H)       Assessment/Plan:  Multiple sclerosis (HCC)  Ms. Heaven Ag is a 53-year-old woman with a past medical history of eczema, and hypothyroidism who was diagnosed with relapsing remitting multiple sclerosis in 2006 after an episode of left eye optic neuritis.  She was previously treated with Copaxone, Avonex, and Tecfidera, but was unable to tolerate these medications do to adverse side effects.  She is now taking low-dose naltrexone and feels clinically stable.  Her outside imaging studies are not available for my review today.  I recommended we repeat new MRI imaging and she has been off of disease modifying treatment.    Plan:  1.  MRI of the brain, cervical, and thoracic spine with and without contrast.  2.  Patient wishes to continue only with low-dose naltrexone at this time.  3.  Continue baclofen 10 mg 3 times a day for spasticity.  4.  Continue Provigil 200 mg once daily for MS related chronic fatigue.  5.  Continue nortriptyline for urinary frequency.        Greater than 50% of this 45 minute face to face encounter was devoted to disease state counseling on Multiple Sclerosis and coordination of care. During today's encounter we discussed available treatment options and their individual side effect profiles. (see above assessment and plan for further details of discussion).         Terri Frank D.O., M.P.H  MS specialist.   Board Certified Neurologist.  Neurology Clerkship Director, [employer].    Neurology,  [facility]  [place], [facility].   Tel: [phone]  Fax: [phone]

## 2019-06-27 ASSESSMENT — ENCOUNTER SYMPTOMS: SHORTNESS OF BREATH: 0

## 2019-06-28 NOTE — ASSESSMENT & PLAN NOTE
Ms. Heaven Ag is a 53-year-old woman with a past medical history of eczema, and hypothyroidism who was diagnosed with relapsing remitting multiple sclerosis in 2006 after an episode of left eye optic neuritis.  She was previously treated with Copaxone, Avonex, and Tecfidera, but was unable to tolerate these medications do to adverse side effects.  She is now taking low-dose naltrexone and feels clinically stable.  Her outside imaging studies are not available for my review today.  I recommended we repeat new MRI imaging and she has been off of disease modifying treatment.    Plan:  1.  MRI of the brain, cervical, and thoracic spine with and without contrast.  2.  Patient wishes to continue only with low-dose naltrexone at this time.  3.  Continue baclofen 10 mg 3 times a day for spasticity.  4.  Continue Provigil 200 mg once daily for MS related chronic fatigue.  5.  Continue nortriptyline for urinary frequency.

## 2019-06-29 ENCOUNTER — OFFICE VISIT (OUTPATIENT)
Dept: URGENT CARE | Facility: PHYSICIAN GROUP | Age: 54
End: 2019-06-29
Payer: COMMERCIAL

## 2019-06-29 ENCOUNTER — HOSPITAL ENCOUNTER (OUTPATIENT)
Dept: RADIOLOGY | Facility: MEDICAL CENTER | Age: 54
End: 2019-06-29
Attending: FAMILY MEDICINE
Payer: COMMERCIAL

## 2019-06-29 VITALS
SYSTOLIC BLOOD PRESSURE: 110 MMHG | BODY MASS INDEX: 32.96 KG/M2 | TEMPERATURE: 98.4 F | DIASTOLIC BLOOD PRESSURE: 78 MMHG | HEART RATE: 78 BPM | RESPIRATION RATE: 14 BRPM | WEIGHT: 192 LBS | OXYGEN SATURATION: 99 %

## 2019-06-29 DIAGNOSIS — J02.9 PHARYNGITIS, UNSPECIFIED ETIOLOGY: ICD-10-CM

## 2019-06-29 DIAGNOSIS — R05.2 SUBACUTE COUGH: ICD-10-CM

## 2019-06-29 PROCEDURE — 71046 X-RAY EXAM CHEST 2 VIEWS: CPT

## 2019-06-29 PROCEDURE — 99214 OFFICE O/P EST MOD 30 MIN: CPT | Performed by: FAMILY MEDICINE

## 2019-06-29 RX ORDER — DOXYCYCLINE HYCLATE 100 MG
100 TABLET ORAL 2 TIMES DAILY
Qty: 20 TAB | Refills: 0 | Status: SHIPPED | OUTPATIENT
Start: 2019-06-29 | End: 2019-07-09

## 2019-06-29 RX ORDER — BENZONATATE 200 MG/1
200 CAPSULE ORAL 3 TIMES DAILY PRN
Qty: 30 CAP | Refills: 0 | Status: SHIPPED | OUTPATIENT
Start: 2019-06-29 | End: 2019-11-21

## 2019-06-29 ASSESSMENT — ENCOUNTER SYMPTOMS
NAUSEA: 0
SINUS PAIN: 0
EYE DISCHARGE: 0
SORE THROAT: 1
EYE REDNESS: 0
VOMITING: 0
MYALGIAS: 0
WEIGHT LOSS: 0

## 2019-06-29 NOTE — PROGRESS NOTES
Subjective:      Heaven Chavis is a 53 y.o. female who presents with Cough (x2mos)            2 months productive cough.  No fever.  +SOB with walking. No wheezing.  No past medical history of asthma/COPD. +PMH pneumonia x3 hosp x1. No GERD.  Possible PND.  No change in environment.  Minimal relief with OTC medications.  No other aggravating or alleviating factors.        Review of Systems   Constitutional: Positive for malaise/fatigue. Negative for weight loss.   HENT: Positive for ear pain and sore throat. Negative for ear discharge and sinus pain.    Eyes: Negative for discharge and redness.   Cardiovascular: Negative for leg swelling.   Gastrointestinal: Negative for nausea and vomiting.   Musculoskeletal: Negative for joint pain and myalgias.   Skin: Negative for itching and rash.     .  Medications, Allergies, and current problem list reviewed today in Epic       Objective:     /78   Pulse 78   Temp 36.9 °C (98.4 °F) (Temporal)   Resp 14   Wt 87.1 kg (192 lb)   SpO2 99%   BMI 32.96 kg/m²      Physical Exam   Constitutional: She is oriented to person, place, and time. She appears well-developed and well-nourished. No distress.   HENT:   Head: Normocephalic and atraumatic.   Right Ear: External ear normal.   Left Ear: External ear normal.   Mouth/Throat: Oropharynx is clear and moist.   Eyes: Conjunctivae are normal.   Neck: Neck supple.   Cardiovascular: Normal rate, regular rhythm and normal heart sounds.    Pulmonary/Chest: Effort normal and breath sounds normal. She has no wheezes.   Lymphadenopathy:     She has no cervical adenopathy.   Neurological: She is alert and oriented to person, place, and time.   Skin: Skin is warm and dry. No rash noted.               Assessment/Plan:     1. Subacute cough  doxycycline (VIBRAMYCIN) 100 MG Tab    benzonatate (TESSALON) 200 MG capsule    DX-CHEST-2 VIEWS   2. Pharyngitis, unspecified etiology  lidocaine viscous 2% (XYLOCAINE) 2 % Solution      Differential diagnosis, natural history, supportive care, and indications for immediate follow-up discussed at length.     cxr pending

## 2019-07-19 ENCOUNTER — HOSPITAL ENCOUNTER (OUTPATIENT)
Dept: RADIOLOGY | Facility: MEDICAL CENTER | Age: 54
End: 2019-07-19
Attending: PSYCHIATRY & NEUROLOGY
Payer: COMMERCIAL

## 2019-07-19 DIAGNOSIS — G35 MULTIPLE SCLEROSIS (HCC): ICD-10-CM

## 2019-07-19 PROCEDURE — 72157 MRI CHEST SPINE W/O & W/DYE: CPT

## 2019-07-19 PROCEDURE — A9585 GADOBUTROL INJECTION: HCPCS | Performed by: PSYCHIATRY & NEUROLOGY

## 2019-07-19 PROCEDURE — 70553 MRI BRAIN STEM W/O & W/DYE: CPT

## 2019-07-19 PROCEDURE — 700117 HCHG RX CONTRAST REV CODE 255: Performed by: PSYCHIATRY & NEUROLOGY

## 2019-07-19 PROCEDURE — 72156 MRI NECK SPINE W/O & W/DYE: CPT

## 2019-07-19 RX ORDER — GADOBUTROL 604.72 MG/ML
8 INJECTION INTRAVENOUS ONCE
Status: COMPLETED | OUTPATIENT
Start: 2019-07-19 | End: 2019-07-19

## 2019-07-19 RX ADMIN — GADOBUTROL 8 ML: 604.72 INJECTION INTRAVENOUS at 15:35

## 2019-08-20 ENCOUNTER — HOSPITAL ENCOUNTER (OUTPATIENT)
Dept: RADIOLOGY | Facility: MEDICAL CENTER | Age: 54
End: 2019-08-20
Attending: FAMILY MEDICINE
Payer: COMMERCIAL

## 2019-08-20 ENCOUNTER — OFFICE VISIT (OUTPATIENT)
Dept: URGENT CARE | Facility: PHYSICIAN GROUP | Age: 54
End: 2019-08-20
Payer: COMMERCIAL

## 2019-08-20 VITALS
SYSTOLIC BLOOD PRESSURE: 124 MMHG | TEMPERATURE: 97.7 F | WEIGHT: 185 LBS | HEART RATE: 68 BPM | BODY MASS INDEX: 31.58 KG/M2 | RESPIRATION RATE: 16 BRPM | DIASTOLIC BLOOD PRESSURE: 76 MMHG | HEIGHT: 64 IN | OXYGEN SATURATION: 100 %

## 2019-08-20 DIAGNOSIS — R10.12 LEFT UPPER QUADRANT PAIN: ICD-10-CM

## 2019-08-20 DIAGNOSIS — R19.7 BLOODY DIARRHEA: ICD-10-CM

## 2019-08-20 PROCEDURE — 99214 OFFICE O/P EST MOD 30 MIN: CPT | Performed by: FAMILY MEDICINE

## 2019-08-20 PROCEDURE — 74019 RADEX ABDOMEN 2 VIEWS: CPT

## 2019-08-20 ASSESSMENT — ENCOUNTER SYMPTOMS
DIARRHEA: 1
DIZZINESS: 1
ABDOMINAL PAIN: 1
SHORTNESS OF BREATH: 0
BLOOD IN STOOL: 1
NAUSEA: 1
VOMITING: 0
HEADACHES: 0
FEVER: 0

## 2019-08-20 NOTE — PROGRESS NOTES
Subjective:     Heaven Chavis is a 54 y.o. female who presents for Diarrhea (red blood diarrhea, dizzy and nausea, abdomenal pain)    HPI  Pt presents for evaluation of a new problem  Pt leaned over an armrest in the car and felt an acute crack in the lower left rib area   Since that time, has had intermittent pain in the left upper abdomen/left lower ribs  This morning, patient had bloody diarrhea and feeling dizzy and nauseated  Dizziness and nausea are continuing to worsen  Abdominal pain is constant, worse with movement, in the left upper quadrant, and does not radiate    Review of Systems   Constitutional: Negative for fever.   Respiratory: Negative for shortness of breath.    Cardiovascular: Negative for chest pain.   Gastrointestinal: Positive for abdominal pain, blood in stool, diarrhea and nausea. Negative for vomiting.   Skin: Negative for rash.   Neurological: Positive for dizziness. Negative for headaches.     PMH:  has a past medical history of Hashimoto's disease (2004) and MS (multiple sclerosis) ([facility]) (2007).  MEDS:   Current Outpatient Medications:   •  Misc. Devices (VAGINAL SUPPOSITORY APPLICATOR) Misc, by Does not apply route., Disp: , Rfl:   •  modafinil (PROVIGIL) 200 MG Tab, Take 1 Tab by mouth every day for 90 days., Disp: 30 Tab, Rfl: 2  •  baclofen (LIORESAL) 10 MG Tab, Take 1 Tab by mouth 3 times a day for 90 days., Disp: 90 Tab, Rfl: 1  •  temazepam (RESTORIL) 15 MG Cap, Take 15 mg by mouth at bedtime as needed for Sleep., Disp: , Rfl:   •  NALTREXONE HCL PO, 3.5 Capsule., Disp: , Rfl: 6  •  SYNTHROID 150 MCG Tab, Take 150 mcg by mouth every day., Disp: , Rfl: 11  •  liothyronine (CYTOMEL) 5 MCG Tab, TAKE 2 TABLETS BY MOUTH EVERY MORNING & TAKE 1 TABLET IN THE EVENING, Disp: , Rfl: 11  •  nortriptyline (PAMELOR) 25 MG Cap, TAKE 1-2 CAPSULES BY MOUTH EVERY EVENING, Disp: , Rfl: 11  •  Clemastine Fumarate 2.68 MG Tab, Take  by mouth 3 times a day. Indications: taking at bed time, rx  "causes drowsiness, Disp: , Rfl:   •  benzonatate (TESSALON) 200 MG capsule, Take 1 Cap by mouth 3 times a day as needed for Cough. (Patient not taking: Reported on 8/20/2019), Disp: 30 Cap, Rfl: 0  •  lidocaine viscous 2% (XYLOCAINE) 2 % Solution, Take 15 mL by mouth as needed for Throat/Mouth Pain. Gargle and spit every 4 hours as needed (Patient not taking: Reported on 8/20/2019), Disp: 120 mL, Rfl: 0  •  maalox plus-benadryl-visc lidocaine (MAGIC MOUTHWASH), Take 5 mL by mouth every 6 hours as needed (throat irritation). (Patient not taking: Reported on 6/25/2019), Disp: 1 Bottle, Rfl: 0  •  promethazine-dextromethorphan (PROMETHAZINE-DM) 6.25-15 MG/5ML syrup, Take 5mls every six hours as needed for cough (Patient not taking: Reported on 8/20/2019), Disp: 180 mL, Rfl: 0  •  MethylPREDNISolone (MEDROL DOSEPAK) 4 MG Tablet Therapy Pack, Use as directed (Patient not taking: Reported on 8/20/2019), Disp: 1 Kit, Rfl: 0  ALLERGIES: No Known Allergies  SURGHX:   Past Surgical History:   Procedure Laterality Date   • ABDOMINAL HYSTERECTOMY TOTAL     • CHOLECYSTECTOMY     • FOOT SURGERY      left foot reattached     SOCHX:  reports that she quit smoking about 19 years ago. Her smoking use included cigarettes. She has never used smokeless tobacco. She reports that she drank alcohol. She reports that she does not use drugs.  FH: Family history was reviewed, not contributing to acute illness     Objective:   /76   Pulse 68   Temp 36.5 °C (97.7 °F)   Resp 16   Ht 1.626 m (5' 4\")   Wt 83.9 kg (185 lb)   SpO2 100%   BMI 31.76 kg/m²      Physical Exam   Constitutional: She is oriented to person, place, and time. She appears well-developed and well-nourished. No distress.   HENT:   Head: Normocephalic and atraumatic.   Eyes: Conjunctivae and EOM are normal.   Neck: Normal range of motion. No tracheal deviation present.   Cardiovascular: Normal rate and regular rhythm.   Pulmonary/Chest: Effort normal and breath " sounds normal. No respiratory distress. She has no wheezes. She has no rales.   Abdominal: Soft. Bowel sounds are normal. She exhibits no distension and no mass. There is no rebound and no guarding.   Tender to palpation in left upper quadrant and in left lower ribs.  No organomegaly appreciated   Neurological: She is alert and oriented to person, place, and time.   Skin: Skin is warm and dry. No rash noted. She is not diaphoretic.   Psychiatric: She has a normal mood and affect. Her behavior is normal. Judgment and thought content normal.     Assessment/Plan:   Assessment    1. Left upper quadrant pain  2. Bloody diarrhea  Patient is a 54-year-old female with left upper quadrant pain and single episode of bloody diarrhea.  Abdominal x-ray did not show any free air or signs of perforated internal organs.  CT abdomen pelvis then performed which showed some postoperative changes but essentially within normal limits.  Patient advised that there is no clear etiology for her pain at this time.  Still awaiting stool studies which may show infectious cause of single bloody bowel movement.  Patient has had normal stools since bloody bowel movement and is hemodynamically stable.  Exam overall consistent with possible bruise to left upper quadrant, however this would not explain the single bloody bowel movement she had.  No indication for ER visit at this point and will await stool studies to further make decision.  - [id number] VIEWS; Future  - CT-ABDOMEN-PELVIS WITH; Future  - CULTURE STOOL; Future  - C Diff by PCR rflx Toxin; Future

## 2019-08-21 ENCOUNTER — HOSPITAL ENCOUNTER (OUTPATIENT)
Dept: RADIOLOGY | Facility: MEDICAL CENTER | Age: 54
End: 2019-08-21
Attending: FAMILY MEDICINE
Payer: COMMERCIAL

## 2019-08-21 ENCOUNTER — HOSPITAL ENCOUNTER (OUTPATIENT)
Facility: MEDICAL CENTER | Age: 54
End: 2019-08-21
Attending: FAMILY MEDICINE
Payer: COMMERCIAL

## 2019-08-21 DIAGNOSIS — R19.7 BLOODY DIARRHEA: ICD-10-CM

## 2019-08-21 DIAGNOSIS — R10.12 LEFT UPPER QUADRANT PAIN: ICD-10-CM

## 2019-08-21 LAB
C DIFF DNA SPEC QL NAA+PROBE: NEGATIVE
C DIFF TOX GENS STL QL NAA+PROBE: NEGATIVE
G LAMBLIA+C PARVUM AG STL QL RAPID: NORMAL
SIGNIFICANT IND 70042: NORMAL
SITE SITE: NORMAL
SOURCE SOURCE: NORMAL

## 2019-08-21 PROCEDURE — 87046 STOOL CULTR AEROBIC BACT EA: CPT

## 2019-08-21 PROCEDURE — 87045 FECES CULTURE AEROBIC BACT: CPT

## 2019-08-21 PROCEDURE — 87493 C DIFF AMPLIFIED PROBE: CPT

## 2019-08-21 PROCEDURE — 87329 GIARDIA AG IA: CPT

## 2019-08-21 PROCEDURE — 87899 AGENT NOS ASSAY W/OPTIC: CPT

## 2019-08-21 PROCEDURE — 700117 HCHG RX CONTRAST REV CODE 255: Performed by: FAMILY MEDICINE

## 2019-08-21 PROCEDURE — 87328 CRYPTOSPORIDIUM AG IA: CPT

## 2019-08-21 PROCEDURE — 74177 CT ABD & PELVIS W/CONTRAST: CPT

## 2019-08-21 RX ADMIN — IOHEXOL 25 ML: 240 INJECTION, SOLUTION INTRATHECAL; INTRAVASCULAR; INTRAVENOUS; ORAL at 11:12

## 2019-08-21 RX ADMIN — IOHEXOL 100 ML: 350 INJECTION, SOLUTION INTRAVENOUS at 11:13

## 2019-08-22 LAB
E COLI SXT1+2 STL IA: NORMAL
SIGNIFICANT IND 70042: NORMAL
SITE SITE: NORMAL
SOURCE SOURCE: NORMAL

## 2019-08-24 LAB
BACTERIA STL CULT: NORMAL
E COLI SXT1+2 STL IA: NORMAL
SIGNIFICANT IND 70042: NORMAL
SITE SITE: NORMAL
SOURCE SOURCE: NORMAL

## 2019-09-24 ENCOUNTER — TELEPHONE (OUTPATIENT)
Dept: MEDICAL GROUP | Facility: PHYSICIAN GROUP | Age: 54
End: 2019-09-24

## 2019-09-24 NOTE — TELEPHONE ENCOUNTER
Phone Number Called: [phone]    Call outcome: [facility]    Message: Spoke with the MA for Dr. Frank. I let MA know that our office received a call from patient stating that she was unable to see Dr. Frank and needed all her medications refilled. Per last visit at Neurology patient was to make 2 month follow up. Which was not made. [facility] but Dr. Basilio will be taking over all Dr. Frank's patient. MA stated that patient would be able to get in with one of the providers for her medication refills. I called [phone] (home) and left a voice message for patient to reach out to Neurology to schedule her next appointment/medication refills.

## 2019-10-29 ENCOUNTER — OFFICE VISIT (OUTPATIENT)
Dept: NEUROLOGY | Facility: MEDICAL CENTER | Age: 54
End: 2019-10-29
Payer: COMMERCIAL

## 2019-10-29 VITALS
HEIGHT: 64 IN | DIASTOLIC BLOOD PRESSURE: 80 MMHG | TEMPERATURE: 97.9 F | SYSTOLIC BLOOD PRESSURE: 126 MMHG | BODY MASS INDEX: 31.76 KG/M2 | HEART RATE: 72 BPM | OXYGEN SATURATION: 99 % | WEIGHT: 186 LBS

## 2019-10-29 DIAGNOSIS — G35 MULTIPLE SCLEROSIS (HCC): ICD-10-CM

## 2019-10-29 PROCEDURE — 99214 OFFICE O/P EST MOD 30 MIN: CPT | Performed by: PSYCHIATRY & NEUROLOGY

## 2019-10-29 NOTE — PROGRESS NOTES
Chief Complaint   Patient presents with   • Follow-Up     MS       Problem List Items Addressed This Visit     Multiple sclerosis (HCC)     ON OS in OCT of 2006, DX in 2007 and she had a positive spinal tap and she saw Dr. Murphy and Copaxone did not work Pt went to a RebSubimage study and that made her sick. Pt was back on Copaxone and Hershewe. She started with LDN in about 2009. Pt has tried Tecfidera and it dried up her skin. Pt has a + FALLON and a family history of MS in her sister. Pt has Hashimoto's thyroiditis and she had a thyroidectomy was in 2005. Pt has a family history of Thyroid problems also. Pt states that she only takes the LDN now and she stopped taking it in 2009 when insurance stopped paying for it. Pt states she has recently had hot flashes. She had a hysterectomy about 2009 but has her ovaries. Pt had a MS flare earlier this year in February this year with severe leg spasms and she took oral steroids in March. Pt took ABX and that helped her get better. The leg spasms are currently resolved. Pt states that she was having some falls with walking. Pt also has brain fog and speech disturbance. Pt has been taking a supplement from strawberries. Pt states that she eats organic because she breaks out in hives. Pt states that she limits simple carbohydrates.Pt drinks tea, coffee and water. Pt has not done much exercise recently. Pt walks for exercise. Pt states that she swims when she cans for exercise. 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 She needs [place] paperwork today.               History of present illness:  Heaven Chavis 54 y.o. female presents today for     Lumbar puncture: Yes    Past DMA’s: Copaxone and interferon which gave her infusion reactions.  Tecfidera cause skin changes.        Current DMA regimen: L DN    Prior neurologists: Dr. Murphy, Dr. Pretty and Dr. Frank    Prior brain imaging: Yes    Currently employed: Yes    Past medical  history:   Past Medical History:   Diagnosis Date   • Hashimoto's disease    • MS (multiple sclerosis) (HCC)        Past surgical history:   Past Surgical History:   Procedure Laterality Date   • ABDOMINAL HYSTERECTOMY TOTAL     • CHOLECYSTECTOMY     • FOOT SURGERY      left foot reattached       Family history:   Family History   Problem Relation Age of Onset   • Stroke Mother    • Clotting Disorder Father    • Thyroid Sister    • Thyroid Sister    • Diabetes Sister    • Thyroid Sister    • Thyroid Sister    • Thyroid Sister    • Thyroid Sister        Social history:   Social History     Socioeconomic History   • Marital status:      Spouse name: Not on file   • Number of children: Not on file   • Years of education: Not on file   • Highest education level: Not on file   Occupational History   • Not on file   Social Needs   • Financial resource strain: Not on file   • Food insecurity:     Worry: Not on file     Inability: Not on file   • Transportation needs:     Medical: Not on file     Non-medical: Not on file   Tobacco Use   • Smoking status: Former Smoker     Types: Cigarettes     Last attempt to quit: 2000     Years since quittin.8   • Smokeless tobacco: Never Used   Substance and Sexual Activity   • Alcohol use: Not Currently     Comment: Occasionally   • Drug use: No   • Sexual activity: Yes   Lifestyle   • Physical activity:     Days per week: Not on file     Minutes per session: Not on file   • Stress: Not on file   Relationships   • Social connections:     Talks on phone: Not on file     Gets together: Not on file     Attends Jehovah's witness service: Not on file     Active member of club or organization: Not on file     Attends meetings of clubs or organizations: Not on file     Relationship status: Not on file   • Intimate partner violence:     Fear of current or ex partner: Not on file     Emotionally abused: Not on file     Physically abused: Not on file     Forced sexual activity: Not on  file   Other Topics Concern   • Not on file   Social History Narrative   • Not on file       Current medications:   Current Outpatient Medications   Medication   • Misc. Devices (VAGINAL SUPPOSITORY APPLICATOR) Misc   • temazepam (RESTORIL) 15 MG Cap   • NALTREXONE HCL PO   • SYNTHROID 150 MCG Tab   • liothyronine (CYTOMEL) 5 MCG Tab   • nortriptyline (PAMELOR) 25 MG Cap   • Clemastine Fumarate 2.68 MG Tab   • benzonatate (TESSALON) 200 MG capsule   • lidocaine viscous 2% (XYLOCAINE) 2 % Solution   • maalox plus-benadryl-visc lidocaine (MAGIC MOUTHWASH)   • promethazine-dextromethorphan (PROMETHAZINE-DM) 6.25-15 MG/5ML syrup   • MethylPREDNISolone (MEDROL DOSEPAK) 4 MG Tablet Therapy Pack     No current facility-administered medications for this visit.        Medication Allergy:  No Known Allergies    Review of systems:   Constitutional: denies fever, night sweats, weight loss, or malaise/fatigue.   Eyes: denies acute vision change, eye pain or secretion.   Ears, Nose, Mouth, Throat: denies nasal secretion, sinus pain, nasal bleeding, difficulty swallowing, sore pain, hearing loss, tinnitus, vertigo, ear pain, acute dental problems, oral ulcers or lesions.   Endocrine: denies recent weight changes, heat or cold intolerance, neck pain or swelling, polyuria, polydypsia, polyphagia,abnormal hair growth.  Cardiovascular: denies new onset of chest pain, palpitations, syncope, lower extremity edema, cyanosis, claudication, orthopnea, or dyspnea of exertion.  Pulmonary: denies shortness of breath, new onset of cough, hemoptysis, wheezing, chest pain or flu-like symptoms.   GI: denies nausea, vomiting, diarrhea, GI bleeding, change in appetite, abdominal pain, and change in bowel habits.  : denies dysuria, frequency, urgency, urinary incontinence, hematuria. Heme/oncology: denies history of easy bruising or bleeding. No history of cancer. Allergy/immunology: denies hives/urticarial, no reports of runny nose or itchy eyes.  "Dermatologic: denies new rash, new/growing/changing skin lesions. Musculoskeletal:denies joint swelling or pain, muscle pain, neck and back pain. Neurologic: denies headaches, acute visual changes, facial droopiness, muscle weakness (focal or generalized), paresthesias, anesthesia, ataxia, change in speech or language, memory loss, abnormal movements, seizures, loss of consciousness, or episodes of confusion.   Psychiatric: denies symptoms of depression, anxiety, hallucinations, mood swings or changes, suicidal or homicidal thoughts.     Physical examination:   Vitals:    10/29/19 1104   BP: 126/80   BP Location: Left arm   Patient Position: Sitting   BP Cuff Size: Adult   Pulse: 72   Temp: 36.6 °C (97.9 °F)   TempSrc: Temporal   SpO2: 99%   Weight: 84.4 kg (186 lb)   Height: 1.626 m (5' 4\")     General: Patient in no acute distress, pleasant and cooperative.  HEENT: Normocephalic, no signs of acute trauma.   Neck: supple, no meningeal signs or carotid bruits. There is normal range of motion. No tenderness on exam.   Chest: clear to auscultation. No cough.   CV: RRR, no murmurs.   Abdomen: soft, non distended or tender.   Skin: no signs of acute rashes or trauma.   Musculoskeletal: joints exhibit full range of motion, without any pain to palpation. There are no signs of joint or muscle swelling. There is no tenderness to deep palpation of muscles.   Psychiatric: No hallucinatory behavior. Denies symptoms of depression or suicidal ideation. Mood and affect appear normal on exam.     NEUROLOGICAL EXAM:   Mental status, orientation: Awake, alert and fully oriented.   Speech and language: speech is clear and fluent. The patient is able to name, repeat and comprehend.   Memory: There is intact recollection of recent and remote events.   Cranial nerve exam: Pupils are 3-4 mm bilaterally and equally reactive to light and accommodation. Visual fields are intact by confrontation. Fundoscopic exam was unremarkable. There is " no nystagmus on primary or secondary gaze. Intact full EOM in all directions of gaze. Face appears symmetric. Sensation in the face is intact to light touch. Uvula is midline. Palate elevates symmetrically. Tongue is midline and without any signs of tongue biting or fasciculations. Sternocleidomastoid muscles exhibit is normal strength bilaterally. Shoulder shrug is intact bilaterally.   Motor exam: Strength is 5/5 in all extremities. Tone is normal. No abnormal movements were seen on exam.   Sensory exam reveals normal sense of light touch, proprioception, vibration and pinprick in all extremities.   Deep tendon reflexes:  2+ throughout. Plantar responses are flexor. There is no clonus.   Coordination: shows a normal finger-nose-finger. Normal rapidly alternating movements.   Gait: The patient was able to get up from seated position on first attempt without requiring assistance. Found to be steady when walking. Movements were fluid with normal arm swing. The patient was able to turn without difficulties or tendency to fall.     ANCILLARY DATA REVIEWED: I reviewed all previous imaging reports, lab reports and clinical notes within epic for the patient.    Lab Data Review:  No results found for this or any previous visit (from the past 24 hour(s)).    Imaging: MRI of the brain and spinal cord reviewed in the PACS system with the patient as below.    HISTORY/REASON FOR EXAM:  MS.      TECHNIQUE/EXAM DESCRIPTION:   MRI of the brain without and with contrast.    T1 sagittal, T2 fast spin-echo axial, T1 coronal, FLAIR coronal, diffusion-weighted and apparent diffusion coefficient (ADC map) axial images were obtained of the whole brain. T1 postcontrast axial and T1 postcontrast coronal images were obtained.    The study was performed on a Nippo Signa 1.5 Jazmine MRI scanner.    8 mL Gadavist contrast was administered intravenously.    COMPARISON:  None.    FINDINGS:  There are multiple discrete small T2 hyperintense lesions  in the periventricular and juxtacortical cerebral white matter, with a few lesions demonstrate a horizontal annular orientation. Findings are nonspecific but with the history of demyelinating   disease. There is no enhancing lesion to suggest active demyelination.    Ventricles, cisterns and cortical sulci are within normal limits.  No restricted diffusion to suggest acute infarct. No intracranial hemorrhage or extra-axial fluid collection. No mass effect, midline shift or hydrocephalus.  Proximal vascular flow voids are patent.    Orbital contents are symmetric.  Paranasal sinuses and mastoids are clear.      Impression       Mild cerebral white matter T2 hyperintense lesions in keeping with the history of demyelinating disease. No enhancing lesions to suggest active demyelination.         ASSESSMENT AND PLAN:    1. Multiple sclerosis (HCC)  Patient with a relapsing multiple sclerosis who has tried and failed Copaxone for injection site reactions and disease breakthrough, Stovall and interference for injection site reactions and flulike side effects, tecfidera for flulike side effects including diarrhea and nausea.  Patient also had skin changes from this medication.  Patient feels like over the last several years she has had progression of her disease in the form of decreased ability to walk because of leg and foot spasms.  Patient has increasing fatigue and some mild cognitive changes related to progression of MS. after discussing risks and benefits of treatment options patient has decided that she would like to try Ocrevus for control of MS. plan to order appropriate previous lab testing with CBC, CMP, hepatitis panel, QuantiFERON gold, varicella antibodies, vitamin D and B.  Start form was filled out.          FOLLOW-UP:   3 months    EDUCATION AND COUNSELIN minutes involved as below  The patient/family educated on diagnosis and prognosis. They understand MS is a chronic progressive disorder for which there  is currently no cure. Despite best efforts in management, the condition is likely to progress and carries significant risk for disability including physical and cognitive.   -Effectiveness, indications, and safety profile of available Disease Modifying Agents (DMA’s) reviewed.   -Side effects of DMA’s were discussed, including: flu like symptoms, myalgias, injection site (infection, pain, bleeding), abnormal LFT’s, pancreatitis, weight changes, development of autoantibodies which may decrease effective of the medication, panic/anxiety attacks, abnormal blood counts (increase risk for bleeding, infections, cancer), increased risk for fetal complications (including congenital malformations, developmental/intellectual disability).    -The patient will require frequent follow up and monitoring.   -Laboratory monitoring every 3 months: CBC, CMP, thyroid panel, vitamin D, UA.   -Imaging of entire neuro-axis (brain and spinal cord) with MRI’s w/wo contrast, every six-12 months.   -Patient/family counseled on medication compliance.       Melissa Bloch, MD  Clinical  of Neurology [facility].   Diplomate in Neurology.   Office: [phone]  Fax: [phone]

## 2019-10-29 NOTE — ASSESSMENT & PLAN NOTE
ON OS in OCT of 2006, DX in 2007 and she had a positive spinal tap and she saw Dr. Murphy and Copaxone did not work Pt went to a Rebif study and that made her sick. Pt was back on Copaxone and Hershewe. She started with LDN in about 2009. Pt has tried Tecfidera and it dried up her skin. Pt has a + FALLON and a family history of MS in her sister. Pt has Hashimoto's thyroiditis and she had a thyroidectomy was in 2005. Pt has a family history of Thyroid problems also. Pt states that she only takes the LDN now and she stopped taking it in 2009 when insurance stopped paying for it. Pt states she has recently had hot flashes. She had a hysterectomy about 2009 but has her ovaries. Pt had a MS flare earlier this year in February this year with severe leg spasms and she took oral steroids in March. Pt took ABX and that helped her get better. The leg spasms are currently resolved. Pt states that she was having some falls with walking. Pt also has brain fog and speech disturbance. Pt has been taking a supplement from strawberries. Pt states that she eats organic because she breaks out in hives. Pt states that she limits simple carbohydrates.Pt drinks tea, coffee and water. Pt has not done much exercise recently. Pt walks for exercise. Pt states that she swims when she cans for exercise. 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 She needs [employer] paperwork today.

## 2019-11-08 ENCOUNTER — HOSPITAL ENCOUNTER (OUTPATIENT)
Dept: LAB | Facility: MEDICAL CENTER | Age: 54
End: 2019-11-08
Attending: PSYCHIATRY & NEUROLOGY
Payer: COMMERCIAL

## 2019-11-08 DIAGNOSIS — G35 MULTIPLE SCLEROSIS (HCC): ICD-10-CM

## 2019-11-08 LAB
25(OH)D3 SERPL-MCNC: 31 NG/ML (ref 30–100)
ALBUMIN SERPL BCP-MCNC: 4.6 G/DL (ref 3.2–4.9)
ALBUMIN/GLOB SERPL: 1.8 G/DL
ALP SERPL-CCNC: 96 U/L (ref 30–99)
ALT SERPL-CCNC: 14 U/L (ref 2–50)
ANION GAP SERPL CALC-SCNC: 10 MMOL/L (ref 0–11.9)
AST SERPL-CCNC: 20 U/L (ref 12–45)
BASOPHILS # BLD AUTO: 0.6 % (ref 0–1.8)
BASOPHILS # BLD: 0.03 K/UL (ref 0–0.12)
BILIRUB SERPL-MCNC: 0.4 MG/DL (ref 0.1–1.5)
BUN SERPL-MCNC: 14 MG/DL (ref 8–22)
CALCIUM SERPL-MCNC: 9.5 MG/DL (ref 8.5–10.5)
CHLORIDE SERPL-SCNC: 105 MMOL/L (ref 96–112)
CO2 SERPL-SCNC: 27 MMOL/L (ref 20–33)
CREAT SERPL-MCNC: 0.73 MG/DL (ref 0.5–1.4)
EOSINOPHIL # BLD AUTO: 0.02 K/UL (ref 0–0.51)
EOSINOPHIL NFR BLD: 0.4 % (ref 0–6.9)
ERYTHROCYTE [DISTWIDTH] IN BLOOD BY AUTOMATED COUNT: 48.1 FL (ref 35.9–50)
GLOBULIN SER CALC-MCNC: 2.5 G/DL (ref 1.9–3.5)
GLUCOSE SERPL-MCNC: 93 MG/DL (ref 65–99)
HBV SURFACE AG SER QL: NEGATIVE
HCT VFR BLD AUTO: 33.5 % (ref 37–47)
HCV AB SER QL: NEGATIVE
HGB BLD-MCNC: 10.4 G/DL (ref 12–16)
IMM GRANULOCYTES # BLD AUTO: 0.01 K/UL (ref 0–0.11)
IMM GRANULOCYTES NFR BLD AUTO: 0.2 % (ref 0–0.9)
LYMPHOCYTES # BLD AUTO: 1.36 K/UL (ref 1–4.8)
LYMPHOCYTES NFR BLD: 26.5 % (ref 22–41)
MCH RBC QN AUTO: 22.9 PG (ref 27–33)
MCHC RBC AUTO-ENTMCNC: 31 G/DL (ref 33.6–35)
MCV RBC AUTO: 73.8 FL (ref 81.4–97.8)
MONOCYTES # BLD AUTO: 0.46 K/UL (ref 0–0.85)
MONOCYTES NFR BLD AUTO: 9 % (ref 0–13.4)
NEUTROPHILS # BLD AUTO: 3.25 K/UL (ref 2–7.15)
NEUTROPHILS NFR BLD: 63.3 % (ref 44–72)
NRBC # BLD AUTO: 0 K/UL
NRBC BLD-RTO: 0 /100 WBC
PLATELET # BLD AUTO: 361 K/UL (ref 164–446)
PMV BLD AUTO: 10.1 FL (ref 9–12.9)
POTASSIUM SERPL-SCNC: 3.7 MMOL/L (ref 3.6–5.5)
PROT SERPL-MCNC: 7.1 G/DL (ref 6–8.2)
RBC # BLD AUTO: 4.54 M/UL (ref 4.2–5.4)
SODIUM SERPL-SCNC: 142 MMOL/L (ref 135–145)
WBC # BLD AUTO: 5.1 K/UL (ref 4.8–10.8)

## 2019-11-08 PROCEDURE — 86803 HEPATITIS C AB TEST: CPT

## 2019-11-08 PROCEDURE — 82784 ASSAY IGA/IGD/IGG/IGM EACH: CPT

## 2019-11-08 PROCEDURE — 36415 COLL VENOUS BLD VENIPUNCTURE: CPT

## 2019-11-08 PROCEDURE — 85025 COMPLETE CBC W/AUTO DIFF WBC: CPT

## 2019-11-08 PROCEDURE — 82306 VITAMIN D 25 HYDROXY: CPT

## 2019-11-08 PROCEDURE — 80053 COMPREHEN METABOLIC PANEL: CPT

## 2019-11-08 PROCEDURE — 86480 TB TEST CELL IMMUN MEASURE: CPT

## 2019-11-08 PROCEDURE — 86787 VARICELLA-ZOSTER ANTIBODY: CPT

## 2019-11-08 PROCEDURE — 87340 HEPATITIS B SURFACE AG IA: CPT

## 2019-11-10 LAB
IGA SERPL-MCNC: 214 MG/DL (ref 68–408)
IGG SERPL-MCNC: 684 MG/DL (ref 768–1632)
IGM SERPL-MCNC: 84 MG/DL (ref 35–263)

## 2019-11-11 LAB
GAMMA INTERFERON BACKGROUND BLD IA-ACNC: 0.05 IU/ML
M TB IFN-G BLD-IMP: NEGATIVE
M TB IFN-G CD4+ BCKGRND COR BLD-ACNC: 0.04 IU/ML
MITOGEN IGNF BCKGRD COR BLD-ACNC: >10 IU/ML
QFT TB2 - NIL TBQ2: 0.02 IU/ML
VZV IGG SER IA-ACNC: 1.58

## 2019-11-21 ENCOUNTER — OUTPATIENT INFUSION SERVICES (OUTPATIENT)
Dept: ONCOLOGY | Facility: MEDICAL CENTER | Age: 54
End: 2019-11-21
Attending: PSYCHIATRY & NEUROLOGY
Payer: COMMERCIAL

## 2019-11-21 VITALS
SYSTOLIC BLOOD PRESSURE: 116 MMHG | HEIGHT: 64 IN | TEMPERATURE: 98 F | OXYGEN SATURATION: 98 % | RESPIRATION RATE: 18 BRPM | DIASTOLIC BLOOD PRESSURE: 56 MMHG | HEART RATE: 71 BPM | BODY MASS INDEX: 30.79 KG/M2 | WEIGHT: 180.34 LBS

## 2019-11-21 PROCEDURE — 700111 HCHG RX REV CODE 636 W/ 250 OVERRIDE (IP): Performed by: PSYCHIATRY & NEUROLOGY

## 2019-11-21 PROCEDURE — 96366 THER/PROPH/DIAG IV INF ADDON: CPT

## 2019-11-21 PROCEDURE — 96365 THER/PROPH/DIAG IV INF INIT: CPT

## 2019-11-21 PROCEDURE — 96413 CHEMO IV INFUSION 1 HR: CPT

## 2019-11-21 PROCEDURE — 96375 TX/PRO/DX INJ NEW DRUG ADDON: CPT

## 2019-11-21 PROCEDURE — 96415 CHEMO IV INFUSION ADDL HR: CPT

## 2019-11-21 PROCEDURE — 700105 HCHG RX REV CODE 258: Performed by: PSYCHIATRY & NEUROLOGY

## 2019-11-21 PROCEDURE — 306780 HCHG STAT FOR TRANSFUSION PER CASE

## 2019-11-21 RX ORDER — TEMAZEPAM 30 MG/1
CAPSULE ORAL
COMMUNITY
Start: 2019-10-01 | End: 2021-05-13 | Stop reason: SDUPTHER

## 2019-11-21 RX ORDER — METHYLPREDNISOLONE SODIUM SUCCINATE 125 MG/2ML
125 INJECTION, POWDER, LYOPHILIZED, FOR SOLUTION INTRAMUSCULAR; INTRAVENOUS ONCE
Status: COMPLETED | OUTPATIENT
Start: 2019-11-21 | End: 2019-11-21

## 2019-11-21 RX ORDER — MULTIVIT WITH MINERALS/LUTEIN
TABLET ORAL
COMMUNITY

## 2019-11-21 RX ORDER — MODAFINIL 200 MG/1
TABLET ORAL
COMMUNITY
Start: 2019-10-20 | End: 2021-05-20 | Stop reason: SDUPTHER

## 2019-11-21 RX ORDER — PRASTERONE 6.5 MG/1
INSERT VAGINAL
Refills: 3 | COMMUNITY
Start: 2019-10-02 | End: 2022-09-15

## 2019-11-21 RX ADMIN — METHYLPREDNISOLONE SODIUM SUCCINATE 125 MG: 125 INJECTION, POWDER, FOR SOLUTION INTRAMUSCULAR; INTRAVENOUS at 09:03

## 2019-11-21 RX ADMIN — OCRELIZUMAB 300 MG: 300 INJECTION INTRAVENOUS at 09:48

## 2019-11-21 RX ADMIN — DIPHENHYDRAMINE HYDROCHLORIDE 50 MG: 50 INJECTION INTRAMUSCULAR; INTRAVENOUS at 09:04

## 2019-11-21 NOTE — PROGRESS NOTES
Pt ambulatory to [facility] for D1 Ocrevus infusion.  Pt w/ no s/s of infection, pt has no complaints at this time.  PIV established in LFA, brisk blood return noted, flushed per protocol.  Pt pre-meds given 30 minutes prior to initiation of infusion.  Ocrevus infused w/ filter in place per initial dose policy, w/ no adverse reactions.  Pt monitored for 1 hour post-infusion w/ no adverse reactions.  PIV removed, gauze and tape dressing applied.  Pt left in care of self in NAD.  Confirmed pt's next appt.

## 2019-12-05 ENCOUNTER — OUTPATIENT INFUSION SERVICES (OUTPATIENT)
Dept: ONCOLOGY | Facility: MEDICAL CENTER | Age: 54
End: 2019-12-05
Attending: PSYCHIATRY & NEUROLOGY
Payer: COMMERCIAL

## 2019-12-05 VITALS
RESPIRATION RATE: 18 BRPM | TEMPERATURE: 97.4 F | WEIGHT: 183.86 LBS | HEIGHT: 64 IN | DIASTOLIC BLOOD PRESSURE: 55 MMHG | SYSTOLIC BLOOD PRESSURE: 118 MMHG | BODY MASS INDEX: 31.39 KG/M2 | OXYGEN SATURATION: 99 % | HEART RATE: 81 BPM

## 2019-12-05 PROCEDURE — 306780 HCHG STAT FOR TRANSFUSION PER CASE

## 2019-12-05 PROCEDURE — 96375 TX/PRO/DX INJ NEW DRUG ADDON: CPT

## 2019-12-05 PROCEDURE — 96415 CHEMO IV INFUSION ADDL HR: CPT

## 2019-12-05 PROCEDURE — 700111 HCHG RX REV CODE 636 W/ 250 OVERRIDE (IP): Performed by: PSYCHIATRY & NEUROLOGY

## 2019-12-05 PROCEDURE — 700105 HCHG RX REV CODE 258: Performed by: PSYCHIATRY & NEUROLOGY

## 2019-12-05 PROCEDURE — 96413 CHEMO IV INFUSION 1 HR: CPT

## 2019-12-05 RX ORDER — METHYLPREDNISOLONE SODIUM SUCCINATE 125 MG/2ML
125 INJECTION, POWDER, LYOPHILIZED, FOR SOLUTION INTRAMUSCULAR; INTRAVENOUS ONCE
Status: COMPLETED | OUTPATIENT
Start: 2019-12-05 | End: 2019-12-05

## 2019-12-05 RX ADMIN — DIPHENHYDRAMINE HYDROCHLORIDE 50 MG: 50 INJECTION INTRAMUSCULAR; INTRAVENOUS at 10:32

## 2019-12-05 RX ADMIN — OCRELIZUMAB 300 MG: 300 INJECTION INTRAVENOUS at 11:15

## 2019-12-05 RX ADMIN — METHYLPREDNISOLONE SODIUM SUCCINATE 125 MG: 125 INJECTION, POWDER, FOR SOLUTION INTRAMUSCULAR; INTRAVENOUS at 10:51

## 2019-12-06 NOTE — PROGRESS NOTES
Pt arrived ambulatory to \[facility]\"" for Day 15 Ocrevus.  Pt stated she tolerated the first dose well.  Denied current infections.  PIV placed, flushed easily, oliverio briskly.  Pt received pre-meds as ordered.  Ocrevus infused through filter Rate started at 30 ml/hr, increased by 30 ml/hr Q30 min to max rate of 180 ml/hr. Pt tolerated infusion well.  Remained in [facility] for 1 hour post infusion for observation. No s/sx of reaction. PIV flushed with NS, then d/c'd. Catheter tip remained intact, gauze and coban to site.  6 month infusion scheduled. Pt left \[facility]\"" in NAD.

## 2020-03-31 ENCOUNTER — OFFICE VISIT (OUTPATIENT)
Dept: NEUROLOGY | Facility: MEDICAL CENTER | Age: 55
End: 2020-03-31
Payer: COMMERCIAL

## 2020-03-31 VITALS
BODY MASS INDEX: 31.92 KG/M2 | DIASTOLIC BLOOD PRESSURE: 64 MMHG | SYSTOLIC BLOOD PRESSURE: 106 MMHG | TEMPERATURE: 98.8 F | HEART RATE: 91 BPM | OXYGEN SATURATION: 99 % | HEIGHT: 64 IN | WEIGHT: 186.95 LBS

## 2020-03-31 DIAGNOSIS — G35 MS (MULTIPLE SCLEROSIS) (HCC): ICD-10-CM

## 2020-03-31 DIAGNOSIS — G35 MULTIPLE SCLEROSIS (HCC): ICD-10-CM

## 2020-03-31 PROCEDURE — 99214 OFFICE O/P EST MOD 30 MIN: CPT | Performed by: PSYCHIATRY & NEUROLOGY

## 2020-03-31 ASSESSMENT — FIBROSIS 4 INDEX: FIB4 SCORE: 0.83

## 2020-03-31 ASSESSMENT — PATIENT HEALTH QUESTIONNAIRE - PHQ9: CLINICAL INTERPRETATION OF PHQ2 SCORE: 0

## 2020-03-31 NOTE — ASSESSMENT & PLAN NOTE
Pt goes back to the office on Friday. Pt needed [facility] paperwork. 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  She works for p.m. to midnight 5 days a week.  She needs some time off around the time of her Ocrevus infusion which is in May.  She also has occasional flares of her MS and needs time off then.  Patient has had some issues with fatigue.  Overall she likes Ocrevus better.  Patient lost her  last October 31 abruptly and states she has been dealing with the grief but is doing as well as can be expected because of the Ocrevus.

## 2020-04-01 NOTE — PROGRESS NOTES
Chief Complaint   Patient presents with   • Follow-Up     MS FMLA       Problem List Items Addressed This Visit     Multiple sclerosis (HCC)     Pt goes back to the office on Friday. Pt needed FMLA paperwork. Pt states that she works in the [employer] office.  She works for p.m. to midnight 5 days a week.  She needs some time off around the time of her Ocrevus infusion which is in May.  She also has occasional flares of her MS and needs time off then.  Patient has had some issues with fatigue.  Overall she likes Ocrevus better.  Patient lost her  last October 31 abruptly and states she has been dealing with the grief but is doing as well as can be expected because of the Ocrevus.           Other Visit Diagnoses     MS (multiple sclerosis) ([facility])        Relevant Orders    CBC WITH DIFFERENTIAL    Comp Metabolic Panel    IMMUNOGLOBULINS A/G/M SERUM    LYMPHOCYTE SUBSET PANEL 5-TOTAL LYMPHOCYTE    LIPID PANEL          History of present illness:  Heaven Chavis 54 y.o. female presents today for treatment of her multiple sclerosis and FMLA paperwork.    Lumbar puncture: Yes    Past DMA’s: Copaxone and interferon which gave her infusion reactions.  Tecfidera cause skin changes.        Current DMA regimen:     Ocrevus    Prior neurologists: Dr. Murphy, Dr. Pretty and Dr. Frank    Prior brain imaging: Yes    Currently employed: Yes    Past medical history:   Past Medical History:   Diagnosis Date   • Hashimoto's disease 2004   • MS (multiple sclerosis) ([facility]) 2007       Past surgical history:   Past Surgical History:   Procedure Laterality Date   • ABDOMINAL HYSTERECTOMY TOTAL     • CHOLECYSTECTOMY     • FOOT SURGERY      left foot reattached       Family history:   Family History   Problem Relation Age of Onset   • Stroke Mother    • Clotting Disorder Father    • Thyroid Sister    • Thyroid Sister    • Diabetes Sister    • Thyroid Sister    • Thyroid Sister    • Thyroid Sister    • Thyroid Sister         Social history:   Social History     Socioeconomic History   • Marital status:      Spouse name: Not on file   • Number of children: Not on file   • Years of education: Not on file   • Highest education level: Not on file   Occupational History   • Not on file   Social Needs   • Financial resource strain: Not on file   • Food insecurity     Worry: Not on file     Inability: Not on file   • Transportation needs     Medical: Not on file     Non-medical: Not on file   Tobacco Use   • Smoking status: Former Smoker     Types: Cigarettes     Last attempt to quit: 2000     Years since quittin.2   • Smokeless tobacco: Never Used   Substance and Sexual Activity   • Alcohol use: Not Currently     Comment: Occasionally   • Drug use: No   • Sexual activity: Yes   Lifestyle   • Physical activity     Days per week: Not on file     Minutes per session: Not on file   • Stress: Not on file   Relationships   • Social connections     Talks on phone: Not on file     Gets together: Not on file     Attends [employer] service: Not on file     Active member of club or organization: Not on file     Attends meetings of clubs or organizations: Not on file     Relationship status: Not on file   • Intimate partner violence     Fear of current or ex partner: Not on file     Emotionally abused: Not on file     Physically abused: Not on file     Forced sexual activity: Not on file   Other Topics Concern   • Not on file   Social History Narrative   • Not on file       Current medications:   Current Outpatient Medications   Medication   • Multiple Vitamins-Minerals (MULTIVITAMIN ADULT PO)   • modafinil (PROVIGIL) 200 MG Tab   • INTRAROSA 6.5 MG INSERT   • temazepam (RESTORIL) 30 MG capsule   • B Complex Vitamins (VITAMIN B COMPLEX PO)   • Ascorbic Acid (VITAMIN C) 1000 MG Tab   • Omega 3-6-9 Fatty Acids (OMEGA 3-6-9 COMPLEX PO)   • vitamin D (CHOLECALCIFEROL) 1000 UNIT Tab   • temazepam (RESTORIL) 15 MG Cap   • NALTREXONE HCL PO   •  SYNTHROID 150 MCG Tab   • liothyronine (CYTOMEL) 5 MCG Tab   • nortriptyline (PAMELOR) 25 MG Cap   • Iron Carbonyl-Vitamin C-FOS (CHEWABLE IRON PO)   • Misc. Devices (VAGINAL SUPPOSITORY APPLICATOR) Misc   • Clemastine Fumarate 2.68 MG Tab     No current facility-administered medications for this visit.        Medication Allergy:  No Known Allergies    Review of systems:   Constitutional: denies fever, night sweats, weight loss, or malaise/fatigue.   Eyes: denies acute vision change, eye pain or secretion.   Ears, Nose, Mouth, Throat: denies nasal secretion, sinus pain, nasal bleeding, difficulty swallowing, sore pain, hearing loss, tinnitus, vertigo, ear pain, acute dental problems, oral ulcers or lesions.   Endocrine: denies recent weight changes, heat or cold intolerance, neck pain or swelling, polyuria, polydypsia, polyphagia,abnormal hair growth.  Cardiovascular: denies new onset of chest pain, palpitations, syncope, lower extremity edema, cyanosis, claudication, orthopnea, or dyspnea of exertion.  Pulmonary: denies shortness of breath, new onset of cough, hemoptysis, wheezing, chest pain or flu-like symptoms.   GI: denies nausea, vomiting, diarrhea, GI bleeding, change in appetite, abdominal pain, and change in bowel habits.  : denies dysuria, frequency, urgency, urinary incontinence, hematuria. Heme/oncology: denies history of easy bruising or bleeding. No history of cancer. Allergy/immunology: denies hives/urticarial, no reports of runny nose or itchy eyes. Dermatologic: denies new rash, new/growing/changing skin lesions. Musculoskeletal:denies joint swelling or pain, muscle pain, neck and back pain. Neurologic: denies headaches, acute visual changes, facial droopiness, muscle weakness (focal or generalized), paresthesias, anesthesia, ataxia, change in speech or language, memory loss, abnormal movements, seizures, loss of consciousness, or episodes of confusion.   Psychiatric: denies symptoms of  "depression, anxiety, hallucinations, mood swings or changes, suicidal or homicidal thoughts.     Physical examination:   Vitals:    03/31/20 1355   BP: 106/64   BP Location: Left arm   Patient Position: Sitting   BP Cuff Size: Adult   Pulse: 91   Temp: 37.1 °C (98.8 °F)   TempSrc: Temporal   SpO2: 99%   Weight: 84.8 kg (186 lb 15.2 oz)   Height: 1.626 m (5' 4\")     General: Patient in no acute distress, pleasant and cooperative.  HEENT: Normocephalic, no signs of acute trauma.   Neck: supple, no meningeal signs or carotid bruits. There is normal range of motion. No tenderness on exam.   Chest: clear to auscultation. No cough.   CV: RRR, no murmurs.   Abdomen: soft, non distended or tender.   Skin: no signs of acute rashes or trauma.   Musculoskeletal: joints exhibit full range of motion, without any pain to palpation. There are no signs of joint or muscle swelling. There is no tenderness to deep palpation of muscles.   Psychiatric: No hallucinatory behavior. Denies symptoms of depression or suicidal ideation. Mood and affect appear normal on exam.     NEUROLOGICAL EXAM:   Mental status, orientation: Awake, alert and fully oriented.   Speech and language: speech is clear and fluent. The patient is able to name, repeat and comprehend.   Memory: There is intact recollection of recent and remote events.   Cranial nerve exam: Pupils are 3-4 mm bilaterally and equally reactive to light and accommodation. Visual fields are intact by confrontation. Fundoscopic exam was unremarkable. There is no nystagmus on primary or secondary gaze. Intact full EOM in all directions of gaze. Face appears symmetric. Sensation in the face is intact to light touch. Uvula is midline. Palate elevates symmetrically. Tongue is midline and without any signs of tongue biting or fasciculations. Sternocleidomastoid muscles exhibit is normal strength bilaterally. Shoulder shrug is intact bilaterally.   Motor exam: Strength is 5/5 in all extremities. " Tone is normal. No abnormal movements were seen on exam.   Sensory exam reveals normal sense of light touch, proprioception, vibration and pinprick in all extremities.   Deep tendon reflexes:  2+ throughout. Plantar responses are flexor. There is no clonus.   Coordination: shows a normal finger-nose-finger. Normal rapidly alternating movements.   Gait: The patient was able to get up from seated position on first attempt without requiring assistance. Found to be steady when walking. Movements were fluid with normal arm swing. The patient was able to turn without difficulties or tendency to fall.     ANCILLARY DATA REVIEWED: I reviewed all previous imaging reports, lab reports and clinical notes within epic for the patient.    Lab Data Review:  No results found for this or any previous visit (from the past 24 hour(s)).    Imaging: MRI of the brain and spinal cord reviewed in the PACS system with the patient as below.    HISTORY/REASON FOR EXAM:  MS.      TECHNIQUE/EXAM DESCRIPTION:   MRI of the brain without and with contrast.    T1 sagittal, T2 fast spin-echo axial, T1 coronal, FLAIR coronal, diffusion-weighted and apparent diffusion coefficient (ADC map) axial images were obtained of the whole brain. T1 postcontrast axial and T1 postcontrast coronal images were obtained.    The study was performed on a [employer] Signa 1.5 Jazmine MRI scanner.    8 mL Gadavist contrast was administered intravenously.    COMPARISON:  None.    FINDINGS:  There are multiple discrete small T2 hyperintense lesions in the periventricular and juxtacortical cerebral white matter, with a few lesions demonstrate a horizontal annular orientation. Findings are nonspecific but with the history of demyelinating   disease. There is no enhancing lesion to suggest active demyelination.    Ventricles, cisterns and cortical sulci are within normal limits.  No restricted diffusion to suggest acute infarct. No intracranial hemorrhage or extra-axial fluid  collection. No mass effect, midline shift or hydrocephalus.  Proximal vascular flow voids are patent.    Orbital contents are symmetric.  Paranasal sinuses and mastoids are clear.      Impression       Mild cerebral white matter T2 hyperintense lesions in keeping with the history of demyelinating disease. No enhancing lesions to suggest active demyelination.         ASSESSMENT AND PLAN:    1. Multiple sclerosis (HCC)  Patient with a relapsing multiple sclerosis who is doing well post her first 2 Ocrevus infusions.  Patient is due for her next infusion in May on the  to be exact.  She needs [place] paperwork for that infusion and we discussed Covid 19 risk versus benefit with this treatment.  The decision since it is some time off is to see her in May before the infusion and decide if we should go forward based on how she was doing and the prevalence of community spread.  I fill out [facility] paperwork for this patient today.  I refilled her medications as necessary.  Patient was counseled on diet and exercise.          FOLLOW-UP:   2 months    EDUCATION AND COUNSELIN minutes involved as below which greater than 50% was involved with education and counseling.  The patient/family educated on diagnosis and prognosis. They understand MS is a chronic progressive disorder for which there is currently no cure. Despite best efforts in management, the condition is likely to progress and carries significant risk for disability including physical and cognitive.   -Effectiveness, indications, and safety profile of available Disease Modifying Agents (DMA’s) reviewed.   -Side effects of DMA’s were discussed, including: flu like symptoms, myalgias, injection site (infection, pain, bleeding), abnormal LFT’s, pancreatitis, weight changes, development of autoantibodies which may decrease effective of the medication, panic/anxiety attacks, abnormal blood counts (increase risk for bleeding, infections, cancer), increased risk for  fetal complications (including congenital malformations, developmental/intellectual disability).    -The patient will require frequent follow up and monitoring.   -Laboratory monitoring every 6 months: CBC, CMP, thyroid panel, vitamin D, UA.   -Imaging of entire neuro-axis (brain and spinal cord) with MRI’s w/wo contrast, every 12 months.   -Patient/family counseled on medication compliance.       Melissa Bloch, MD  Clinical  of Neurology [facility].   Diplomate in Neurology.   Office: [phone]  Fax: [phone]

## 2020-04-13 ENCOUNTER — HOSPITAL ENCOUNTER (OUTPATIENT)
Dept: LAB | Facility: MEDICAL CENTER | Age: 55
End: 2020-04-13
Attending: INTERNAL MEDICINE
Payer: COMMERCIAL

## 2020-04-13 ENCOUNTER — HOSPITAL ENCOUNTER (OUTPATIENT)
Dept: LAB | Facility: MEDICAL CENTER | Age: 55
End: 2020-04-13
Attending: PSYCHIATRY & NEUROLOGY
Payer: COMMERCIAL

## 2020-04-13 DIAGNOSIS — G35 MS (MULTIPLE SCLEROSIS) (HCC): ICD-10-CM

## 2020-04-13 LAB
ALBUMIN SERPL BCP-MCNC: 4.6 G/DL (ref 3.2–4.9)
ALBUMIN/GLOB SERPL: 1.8 G/DL
ALP SERPL-CCNC: 110 U/L (ref 30–99)
ALT SERPL-CCNC: 28 U/L (ref 2–50)
ANION GAP SERPL CALC-SCNC: 13 MMOL/L (ref 7–16)
AST SERPL-CCNC: 31 U/L (ref 12–45)
BASOPHILS # BLD AUTO: 0.8 % (ref 0–1.8)
BASOPHILS # BLD: 0.04 K/UL (ref 0–0.12)
BILIRUB SERPL-MCNC: 0.3 MG/DL (ref 0.1–1.5)
BUN SERPL-MCNC: 14 MG/DL (ref 8–22)
CALCIUM SERPL-MCNC: 9.2 MG/DL (ref 8.5–10.5)
CHLORIDE SERPL-SCNC: 102 MMOL/L (ref 96–112)
CO2 SERPL-SCNC: 26 MMOL/L (ref 20–33)
CREAT SERPL-MCNC: 0.65 MG/DL (ref 0.5–1.4)
EOSINOPHIL # BLD AUTO: 0.03 K/UL (ref 0–0.51)
EOSINOPHIL NFR BLD: 0.6 % (ref 0–6.9)
ERYTHROCYTE [DISTWIDTH] IN BLOOD BY AUTOMATED COUNT: 46.4 FL (ref 35.9–50)
GLOBULIN SER CALC-MCNC: 2.6 G/DL (ref 1.9–3.5)
GLUCOSE SERPL-MCNC: 99 MG/DL (ref 65–99)
HCT VFR BLD AUTO: 34.5 % (ref 37–47)
HGB BLD-MCNC: 10.4 G/DL (ref 12–16)
IMM GRANULOCYTES # BLD AUTO: 0.01 K/UL (ref 0–0.11)
IMM GRANULOCYTES NFR BLD AUTO: 0.2 % (ref 0–0.9)
LYMPHOCYTES # BLD AUTO: 1.49 K/UL (ref 1–4.8)
LYMPHOCYTES NFR BLD: 30.7 % (ref 22–41)
MCH RBC QN AUTO: 22.3 PG (ref 27–33)
MCHC RBC AUTO-ENTMCNC: 30.1 G/DL (ref 33.6–35)
MCV RBC AUTO: 73.9 FL (ref 81.4–97.8)
MONOCYTES # BLD AUTO: 0.43 K/UL (ref 0–0.85)
MONOCYTES NFR BLD AUTO: 8.9 % (ref 0–13.4)
NEUTROPHILS # BLD AUTO: 2.85 K/UL (ref 2–7.15)
NEUTROPHILS NFR BLD: 58.8 % (ref 44–72)
NRBC # BLD AUTO: 0 K/UL
NRBC BLD-RTO: 0 /100 WBC
PLATELET # BLD AUTO: 366 K/UL (ref 164–446)
PMV BLD AUTO: 10.2 FL (ref 9–12.9)
POTASSIUM SERPL-SCNC: 4.4 MMOL/L (ref 3.6–5.5)
PROT SERPL-MCNC: 7.2 G/DL (ref 6–8.2)
RBC # BLD AUTO: 4.67 M/UL (ref 4.2–5.4)
SODIUM SERPL-SCNC: 141 MMOL/L (ref 135–145)
T3FREE SERPL-MCNC: 3.09 PG/ML (ref 2.4–4.2)
T4 FREE SERPL-MCNC: 1.31 NG/DL (ref 0.53–1.43)
TSH SERPL DL<=0.005 MIU/L-ACNC: 0.01 UIU/ML (ref 0.38–5.33)
WBC # BLD AUTO: 4.9 K/UL (ref 4.8–10.8)

## 2020-04-13 PROCEDURE — 84439 ASSAY OF FREE THYROXINE: CPT

## 2020-04-13 PROCEDURE — 86355 B CELLS TOTAL COUNT: CPT

## 2020-04-13 PROCEDURE — 82784 ASSAY IGA/IGD/IGG/IGM EACH: CPT

## 2020-04-13 PROCEDURE — 36415 COLL VENOUS BLD VENIPUNCTURE: CPT

## 2020-04-13 PROCEDURE — 84481 FREE ASSAY (FT-3): CPT

## 2020-04-13 PROCEDURE — 86357 NK CELLS TOTAL COUNT: CPT

## 2020-04-13 PROCEDURE — 84443 ASSAY THYROID STIM HORMONE: CPT

## 2020-04-13 PROCEDURE — 80053 COMPREHEN METABOLIC PANEL: CPT

## 2020-04-13 PROCEDURE — 86359 T CELLS TOTAL COUNT: CPT

## 2020-04-13 PROCEDURE — 86360 T CELL ABSOLUTE COUNT/RATIO: CPT

## 2020-04-13 PROCEDURE — 85025 COMPLETE CBC W/AUTO DIFF WBC: CPT

## 2020-04-15 LAB
ANNOTATION COMMENT IMP: ABNORMAL
CD19 CELLS NFR SPEC: <1 % (ref 6–23)
CD3 CELLS # BLD: 1277 CELLS/UL (ref 570–2400)
CD3 CELLS NFR SPEC: 83 % (ref 62–87)
CD3+CD4+ CELLS # BLD: 800 CELLS/UL (ref 430–1800)
CD3+CD4+ CELLS NFR BLD: 52 % (ref 32–64)
CD3+CD4+ CELLS/CD3+CD8+ CLL BLD: 1.73 RATIO (ref 0.8–3.9)
CD3+CD8+ CELLS # BLD: 457 CELLS/UL (ref 210–1200)
CD3+CD8+ CELLS NFR SPEC: 30 % (ref 15–46)
CD3-CD16+CD56+ CELLS # SPEC: 249 CELLS/UL (ref 78–470)
CD3-CD16+CD56+ CELLS NFR SPEC: 16 % (ref 4–26)
CELLS.CD3-CD19+ [#/VOLUME] IN BLOOD: 1 CELLS/UL (ref 91–610)
IGA SERPL-MCNC: 203 MG/DL (ref 68–408)
IGG SERPL-MCNC: 658 MG/DL (ref 768–1632)
IGM SERPL-MCNC: 60 MG/DL (ref 35–263)

## 2020-04-21 ENCOUNTER — TELEPHONE (OUTPATIENT)
Dept: NEUROLOGY | Facility: MEDICAL CENTER | Age: 55
End: 2020-04-21

## 2020-04-21 NOTE — TELEPHONE ENCOUNTER
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 Basically let us know exactly what she wants us to write in the letter. Thanks MB

## 2020-04-21 NOTE — TELEPHONE ENCOUNTER
Partha Mary April  P Neurology [facility]   Phone Number: [phone]             My work is requesting documentation that you would prefer that I not work back to back 12 hour shifts.  I am having to do it this week as I do not have any documentation yet.     My home fax is [phone].  My work fax (not a private fax) is [phone].  Any letter or document can be emailed to my work at [email or web address].      Sent via [employer]

## 2020-04-22 NOTE — TELEPHONE ENCOUNTER
Heaven Chavis, Med [employer]'t   Phone Number: [phone]             working back to back 12 shifts cause a flare.  If she can write a letter saying for health reasons I can only work a maximum of two 12-hour shifts per work week, with a minimum of one 8 hour shift between, it would help a lot.      Sent via [employer]

## 2020-04-27 NOTE — TELEPHONE ENCOUNTER
Ok to write a letter for patient with those specific parameters or I can write it tomorrow.Thanks MB

## 2020-04-30 ENCOUNTER — PATIENT MESSAGE (OUTPATIENT)
Dept: MEDICAL GROUP | Facility: PHYSICIAN GROUP | Age: 55
End: 2020-04-30

## 2020-04-30 NOTE — TELEPHONE ENCOUNTER
From: Heaven Alvarez Partha  To: KRISTINA Iverson  Sent: 4/30/2020 12:53 PM PDT  Subject: Prescription Question    Would I be able to meet with you for an anti-depressant med like Wellbutrin? I recently lost my  and I am finding myself crying over the grass and everyday things, constantly.

## 2020-05-19 ENCOUNTER — OFFICE VISIT (OUTPATIENT)
Dept: NEUROLOGY | Facility: MEDICAL CENTER | Age: 55
End: 2020-05-19
Payer: COMMERCIAL

## 2020-05-19 VITALS
SYSTOLIC BLOOD PRESSURE: 118 MMHG | DIASTOLIC BLOOD PRESSURE: 76 MMHG | HEIGHT: 64 IN | HEART RATE: 86 BPM | OXYGEN SATURATION: 98 % | TEMPERATURE: 97.2 F | WEIGHT: 189.6 LBS | BODY MASS INDEX: 32.37 KG/M2

## 2020-05-19 DIAGNOSIS — F32.9 REACTIVE DEPRESSION: ICD-10-CM

## 2020-05-19 DIAGNOSIS — D50.9 IRON DEFICIENCY ANEMIA, UNSPECIFIED IRON DEFICIENCY ANEMIA TYPE: ICD-10-CM

## 2020-05-19 DIAGNOSIS — G35 MULTIPLE SCLEROSIS (HCC): ICD-10-CM

## 2020-05-19 PROCEDURE — 99214 OFFICE O/P EST MOD 30 MIN: CPT | Performed by: PSYCHIATRY & NEUROLOGY

## 2020-05-19 ASSESSMENT — PATIENT HEALTH QUESTIONNAIRE - PHQ9
5. POOR APPETITE OR OVEREATING: 0 - NOT AT ALL
CLINICAL INTERPRETATION OF PHQ2 SCORE: 2
SUM OF ALL RESPONSES TO PHQ QUESTIONS 1-9: 5

## 2020-05-19 ASSESSMENT — FIBROSIS 4 INDEX: FIB4 SCORE: 0.9

## 2020-05-19 NOTE — ASSESSMENT & PLAN NOTE
Pt feels like she is very fatigued going down stairs and she feels like the back of her legs are weak.  Patient states that there her fatigue has been worse as has been her cognition.  Due to her significant fatigue patient had to step back from working 12-hour shifts and does better with 8-hour shifts at her job.  She feels completely done when she is done with work.  Patient states that she has felt more depressed recently.  She is wondering if Wellbutrin may be helpful.

## 2020-05-19 NOTE — PROGRESS NOTES
Chief Complaint   Patient presents with   • Follow-Up     MS       Problem List Items Addressed This Visit     Multiple sclerosis (HCC)     Pt feels like she is very fatigued going down stairs and she feels like the back of her legs are weak.  Patient states that there her fatigue has been worse as has been her cognition.  Due to her significant fatigue patient had to step back from working 12-hour shifts and does better with 8-hour shifts at her job.  She feels completely done when she is done with work.  Patient states that she has felt more depressed recently.  She is wondering if Wellbutrin may be helpful.         Iron deficiency anemia     Pt has chronic anemia with fatigue and she is taking iron supplements.  Patient has had issues with chronic anemia per my review of her labs in the chart for the last 4 to 5 years.  Patient states her anemia has never been worked up other than she was told to take iron.  Patient is no longer menstruating and has no known bleeding source.  Patient has excessive fatigue which may be MS and anemia related.         Relevant Orders    REFERRAL TO HEMATOLOGY ONCOLOGY Referral to? [facility]          History of present illness:  Heaven Chavis 54 y.o. female presents today for multiple sclerosis and review of labs.    Past medical history:   Past Medical History:   Diagnosis Date   • Hashimoto's disease 2004   • MS (multiple sclerosis) ([facility]) 2007       Past surgical history:   Past Surgical History:   Procedure Laterality Date   • ABDOMINAL HYSTERECTOMY TOTAL     • CHOLECYSTECTOMY     • FOOT SURGERY      left foot reattached       Family history:   Family History   Problem Relation Age of Onset   • Stroke Mother    • Clotting Disorder Father    • Thyroid Sister    • Thyroid Sister    • Diabetes Sister    • Thyroid Sister    • Thyroid Sister    • Thyroid Sister    • Thyroid Sister        Social history:   Social History     Socioeconomic History   • Marital status:       Spouse name: Not on file   • Number of children: Not on file   • Years of education: Not on file   • Highest education level: Not on file   Occupational History   • Not on file   Social Needs   • Financial resource strain: Not on file   • Food insecurity     Worry: Not on file     Inability: Not on file   • Transportation needs     Medical: Not on file     Non-medical: Not on file   Tobacco Use   • Smoking status: Former Smoker     Types: Cigarettes     Last attempt to quit: 2000     Years since quittin.3   • Smokeless tobacco: Never Used   Substance and Sexual Activity   • Alcohol use: Not Currently     Comment: Occasionally   • Drug use: No   • Sexual activity: Yes   Lifestyle   • Physical activity     Days per week: Not on file     Minutes per session: Not on file   • Stress: Not on file   Relationships   • Social connections     Talks on phone: Not on file     Gets together: Not on file     Attends [employer] service: Not on file     Active member of club or organization: Not on file     Attends meetings of clubs or organizations: Not on file     Relationship status: Not on file   • Intimate partner violence     Fear of current or ex partner: Not on file     Emotionally abused: Not on file     Physically abused: Not on file     Forced sexual activity: Not on file   Other Topics Concern   • Not on file   Social History Narrative   • Not on file       Current medications:   Current Outpatient Medications   Medication   • Multiple Vitamins-Minerals (MULTIVITAMIN ADULT PO)   • Iron Carbonyl-Vitamin C-FOS (CHEWABLE IRON PO)   • modafinil (PROVIGIL) 200 MG Tab   • INTRAROSA 6.5 MG INSERT   • temazepam (RESTORIL) 30 MG capsule   • B Complex Vitamins (VITAMIN B COMPLEX PO)   • Ascorbic Acid (VITAMIN C) 1000 MG Tab   • Omega 3-6-9 Fatty Acids (OMEGA 3-6-9 COMPLEX PO)   • vitamin D (CHOLECALCIFEROL) 1000 UNIT Tab   • temazepam (RESTORIL) 15 MG Cap   • NALTREXONE HCL PO   • SYNTHROID 150 MCG Tab   •  liothyronine (CYTOMEL) 5 MCG Tab   • nortriptyline (PAMELOR) 25 MG Cap   • Clemastine Fumarate 2.68 MG Tab   • Misc. Devices (VAGINAL SUPPOSITORY APPLICATOR) [facility]     No current facility-administered medications for this visit.        Medication Allergy:  No Known Allergies    Review of systems:   Constitutional: denies fever, night sweats, weight loss.   Eyes: denies acute vision change, eye pain or secretion.   Ears, Nose, Mouth, Throat: denies nasal secretion, nasal bleeding, difficulty swallowing, hearing loss, tinnitus, vertigo, ear pain, acute dental problems, oral ulcers or lesions.   Endocrine: denies recent weight changes, heat or cold intolerance, polyuria, polydypsia, polyphagia,abnormal hair growth.  Cardiovascular: denies new onset of chest pain, palpitations, syncope, or dyspnea of exertion.  Pulmonary: denies shortness of breath, new onset of cough, hemoptysis, wheezing, chest pain or flu-like symptoms.   GI: denies nausea, vomiting, diarrhea, GI bleeding, change in appetite, abdominal pain, and change in bowel habits.  : denies dysuria, urinary incontinence, hematuria.  Heme/oncology: denies history of easy bruising or bleeding. No history of cancer, DVTor PE.  Allergy/immunology: denies hives/urticaria, or itching.   Dermatologic: denies new rash, or new skin lesions.  Musculoskeletal:denies joint swelling or pain, muscle pain, neck and back pain. Neurologic: denies headaches, acute visual changes, facial droopiness, muscle weakness (focal or generalized), paresthesias, anesthesia, ataxia, change in speech or language, memory loss, abnormal movements, seizures, loss of consciousness, or episodes of confusion.   Psychiatric: denies symptoms of depression, anxiety, hallucinations, mood swings or changes, suicidal or homicidal thoughts.     Physical examination:   Vitals:    05/19/20 1332   BP: 118/76   BP Location: Left arm   Patient Position: Sitting   BP Cuff Size: Adult   Pulse: 86   Temp: 36.2  "°C (97.2 °F)   TempSrc: Temporal   SpO2: 98%   Weight: 86 kg (189 lb 9.5 oz)   Height: 1.626 m (5' 4\")     General: Patient in no acute distress, pleasant and cooperative.  HEENT: Normocephalic, no signs of acute trauma.   Neck: supple, no meningeal signs or carotid bruits. There is normal range of motion. No tenderness on exam.   Chest: clear to auscultation. No cough.   CV: RRR, no murmurs.   Skin: no signs of acute rashes or trauma.   Musculoskeletal: joints exhibit full range of motion, without any pain to palpation. There are no signs of joint or muscle swelling. There is no tenderness to deep palpation of muscles.   Psychiatric: No hallucinatory behavior. Denies symptoms of depression or suicidal ideation. Mood and affect appear normal on exam.     NEUROLOGICAL EXAM:   Mental status, orientation: Awake, alert and fully oriented.   Speech and language: speech is clear and fluent. The patient is able to name, repeat and comprehend.   Memory: There is intact recollection of recent and remote events.   Cranial nerve exam: Pupils are 3-4 mm bilaterally and equally reactive to light and accommodation. Visual fields are intact by confrontation. Fundoscopic exam was unremarkable. There is no nystagmus on primary or secondary gaze. Intact full EOM in all directions of gaze. Face appears symmetric. Sensation in the face is intact to light touch. Uvula is midline. Palate elevates symmetrically. Tongue is midline and without any signs of tongue biting or fasciculations. Sternocleidomastoid muscles exhibit is normal strength bilaterally. Shoulder shrug is intact bilaterally.   Motor exam: Strength is 5/5 in all extremities. Tone is normal. No abnormal movements were seen on exam.   Sensory exam reveals normal sense of light touch, proprioception, vibration and pinprick in all extremities.   Deep tendon reflexes:  2+ throughout. Plantar responses are flexor. There is no clonus.   Coordination: shows a normal " finger-nose-finger. Normal rapidly alternating movements.   Gait: The patient was able to get up from seated position on first attempt without requiring assistance. Found to be steady when walking. Movements were fluid with normal arm swing. The patient was able to turn without difficulties or tendency to fall. Romberg examination       ANCILLARY DATA REVIEWED:     Lab Data Review:  No results found for this or any previous visit (from the past 24 hour(s)).    Records reviewed: I reviewed previous H&H and all of her primary care visits and anemia was not assessed.      Imaging: I reviewed patient's last MRI scan of the brain, cervical and thoracic spine which were done in the PACS system in July 2019.          ASSESSMENT AND PLAN:    1. Multiple sclerosis (HCC)  Plan to continue Ocrevus.  Patient states that she usually does well with her infusions and is due to have one on Thursday.  She has no recent infections.  Patient does state that she has had worsening of her gait.  Patient also states that she especially notices weakness going down stairs.  She was doing physical therapy but has had to stop that because of the COVID pandemic but is looking forward to going back to it.  Patient states that she needs to continue to work on her healthy diet.    Pending patient's response to Ocrevus consider MRIs at next visit if she still weak in the legs.    2. Iron deficiency anemia, unspecified iron deficiency anemia type  Patient with chronic anemia of unknown etiology but could be related to iron deficiency however she has been taking supplements and she continues to have problems.  - REFERRAL TO HEMATOLOGY ONCOLOGY Referral to? [facility]        FOLLOW-UP:   2 months     I spent 45 minutes with this patient, over fifty percent was spent counseling patient on their condition, best management practices, reviewing test results and risks and benefits of treatment.          EDUCATION AND COUNSELING:  -Discussed  regular exercise program and prevention of cardiovascular disease, including stroke.   -Discussed healthy lifestyle, including: healthy diet (rich in fruits, vegetables, nuts and healthy oils); proper hydration, and adequate sleep hygiene (allowing 7-8 hrs of overnight sleep).        Melissa Bloch, MD  Clinical  of Neurology [facility].   Diplomate in Neurology.   Office: [phone]  Fax: [phone]

## 2020-05-19 NOTE — ASSESSMENT & PLAN NOTE
Pt has chronic anemia with fatigue and she is taking iron supplements.  Patient has had issues with chronic anemia per my review of her labs in the chart for the last 4 to 5 years.  Patient states her anemia has never been worked up other than she was told to take iron.  Patient is no longer menstruating and has no known bleeding source.  Patient has excessive fatigue which may be MS and anemia related.

## 2020-05-20 ENCOUNTER — TELEPHONE (OUTPATIENT)
Dept: NEUROLOGY | Facility: MEDICAL CENTER | Age: 55
End: 2020-05-20

## 2020-05-20 ENCOUNTER — TELEPHONE (OUTPATIENT)
Dept: ONCOLOGY | Facility: MEDICAL CENTER | Age: 55
End: 2020-05-20

## 2020-05-20 PROBLEM — F32.9 REACTIVE DEPRESSION: Status: ACTIVE | Noted: 2020-05-20

## 2020-05-20 RX ORDER — BUPROPION HYDROCHLORIDE 150 MG/1
150 TABLET, EXTENDED RELEASE ORAL 2 TIMES DAILY
Qty: 60 TAB | Refills: 11 | Status: SHIPPED | OUTPATIENT
Start: 2020-05-20 | End: 2020-06-19

## 2020-05-20 NOTE — TELEPHONE ENCOUNTER
Pt called states she just saw dr Bloch they discussed she was going to send a script for Wellbutrin.  Please advise, I don't see a script in chart for it.

## 2020-05-21 ENCOUNTER — OUTPATIENT INFUSION SERVICES (OUTPATIENT)
Dept: ONCOLOGY | Facility: MEDICAL CENTER | Age: 55
End: 2020-05-21
Attending: PSYCHIATRY & NEUROLOGY
Payer: COMMERCIAL

## 2020-05-21 VITALS
HEIGHT: 65 IN | TEMPERATURE: 97.5 F | RESPIRATION RATE: 18 BRPM | SYSTOLIC BLOOD PRESSURE: 115 MMHG | DIASTOLIC BLOOD PRESSURE: 57 MMHG | BODY MASS INDEX: 31.37 KG/M2 | WEIGHT: 188.27 LBS | OXYGEN SATURATION: 97 % | HEART RATE: 78 BPM

## 2020-05-21 DIAGNOSIS — G35 MULTIPLE SCLEROSIS (HCC): ICD-10-CM

## 2020-05-21 PROCEDURE — 96365 THER/PROPH/DIAG IV INF INIT: CPT

## 2020-05-21 PROCEDURE — 700111 HCHG RX REV CODE 636 W/ 250 OVERRIDE (IP): Performed by: PSYCHIATRY & NEUROLOGY

## 2020-05-21 PROCEDURE — 96366 THER/PROPH/DIAG IV INF ADDON: CPT

## 2020-05-21 PROCEDURE — 96413 CHEMO IV INFUSION 1 HR: CPT

## 2020-05-21 PROCEDURE — 700105 HCHG RX REV CODE 258: Performed by: PSYCHIATRY & NEUROLOGY

## 2020-05-21 PROCEDURE — 96375 TX/PRO/DX INJ NEW DRUG ADDON: CPT

## 2020-05-21 PROCEDURE — 96415 CHEMO IV INFUSION ADDL HR: CPT

## 2020-05-21 RX ORDER — METHYLPREDNISOLONE SODIUM SUCCINATE 125 MG/2ML
125 INJECTION, POWDER, LYOPHILIZED, FOR SOLUTION INTRAMUSCULAR; INTRAVENOUS ONCE
Status: COMPLETED | OUTPATIENT
Start: 2020-05-21 | End: 2020-05-21

## 2020-05-21 RX ADMIN — OCRELIZUMAB 600 MG: 300 INJECTION INTRAVENOUS at 10:59

## 2020-05-21 RX ADMIN — METHYLPREDNISOLONE SODIUM SUCCINATE 125 MG: 125 INJECTION, POWDER, FOR SOLUTION INTRAMUSCULAR; INTRAVENOUS at 10:19

## 2020-05-21 RX ADMIN — DIPHENHYDRAMINE HYDROCHLORIDE 50 MG: 50 INJECTION, SOLUTION INTRAMUSCULAR; INTRAVENOUS at 10:19

## 2020-05-21 ASSESSMENT — FIBROSIS 4 INDEX: FIB4 SCORE: 0.9

## 2020-05-21 NOTE — PROGRESS NOTES
Pt ambulatory to [facility] for [id number] Ocrevus infusion.  Pt w/ no s/s of infection, pt has no complaints at this time.  PIV established in RAC, brisk blood return noted, flushed per protocol.  Pt pre-meds given 30 minutes prior to initiation of infusion.  Ocrevus infused w/ filter in place per subsequent dose policy, w/ no adverse reactions.  Pt monitored for 1 hour post-infusion w/ no adverse reactions.  PIV removed, gauze and tape dressing applied.  Pt left in care of self in NAD.  Confirmed pt's next appt.

## 2020-06-29 ENCOUNTER — TELEPHONE (OUTPATIENT)
Dept: NEUROLOGY | Facility: MEDICAL CENTER | Age: 55
End: 2020-06-29

## 2020-06-29 NOTE — LETTER
2020            To Whom it May Concern,           Heaven ARAIZA 1965 is under my care for Multiple Sclerosis. She cannot wear a mask due to Multiple Sclerosis being sensitive to overheating.     If you have any questions or concerns please don't hesitate to contact us.        Sincerely,                 Melissa Pulver Bloch, MD  Clinical Professor of Neurology  [employer]  Electronically signed

## 2020-06-29 NOTE — TELEPHONE ENCOUNTER
Okay to write a note that patient can not wear a mask due to multiple sclerosis being sensitive to overheating.  Thanks, Dr. Bloch

## 2020-06-29 NOTE — TELEPHONE ENCOUNTER
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  This causes overheating and my glasses fog up.  My supervisor, TYSON Esquivel asked that I get a note from you in order not to wear  a mask.  I don't have contact lenses.        Sent via [employer]

## 2020-07-21 ENCOUNTER — OFFICE VISIT (OUTPATIENT)
Dept: NEUROLOGY | Facility: MEDICAL CENTER | Age: 55
End: 2020-07-21
Payer: COMMERCIAL

## 2020-07-21 VITALS
BODY MASS INDEX: 30.86 KG/M2 | WEIGHT: 180.78 LBS | OXYGEN SATURATION: 99 % | SYSTOLIC BLOOD PRESSURE: 118 MMHG | TEMPERATURE: 98.5 F | HEIGHT: 64 IN | DIASTOLIC BLOOD PRESSURE: 76 MMHG | HEART RATE: 87 BPM

## 2020-07-21 DIAGNOSIS — F32.9 REACTIVE DEPRESSION: ICD-10-CM

## 2020-07-21 DIAGNOSIS — G35 MULTIPLE SCLEROSIS (HCC): ICD-10-CM

## 2020-07-21 DIAGNOSIS — F51.01 PRIMARY INSOMNIA: ICD-10-CM

## 2020-07-21 PROCEDURE — 99214 OFFICE O/P EST MOD 30 MIN: CPT | Performed by: PSYCHIATRY & NEUROLOGY

## 2020-07-21 RX ORDER — TEMAZEPAM 15 MG/1
15 CAPSULE ORAL NIGHTLY PRN
Qty: 30 CAP | Refills: 5 | Status: SHIPPED | OUTPATIENT
Start: 2020-07-21 | End: 2020-08-20

## 2020-07-21 RX ORDER — FLUOXETINE 10 MG/1
10 CAPSULE ORAL DAILY
Qty: 30 CAP | Refills: 11 | Status: SHIPPED | OUTPATIENT
Start: 2020-07-21 | End: 2021-03-12 | Stop reason: SDUPTHER

## 2020-07-21 ASSESSMENT — FIBROSIS 4 INDEX: FIB4 SCORE: 0.91

## 2020-07-21 NOTE — ASSESSMENT & PLAN NOTE
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 Wellbutrin made her anxious. Nortriptyline she only takes once in awhile. Provigil works for the patient.

## 2020-07-22 NOTE — PROGRESS NOTES
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 Wellbutrin made her anxious. Nortriptyline she only takes once in awhile. Provigil works for the patient.         Relevant Orders    MR-BRAIN-WITH & W/O    Reactive depression     Patient felt that the Wellbutrin made her somewhat anxious and did not help with her mood so she stopped it.  Patient has had a lot of loss in the last year with her in-law,  and dog dying.  Patient does have a good support group but sometimes it gets to her.         Relevant Medications    FLUoxetine (PROZAC) 10 MG Cap      Other Visit Diagnoses     Primary insomnia        Relevant Medications    temazepam (RESTORIL) 15 MG Cap          History of present illness:  Heaven Chavis 54 y.o. female presents today for multiple sclerosis, reactive depression and insomnia.    Past medical history:   Past Medical History:   Diagnosis Date   • Hashimoto's disease 2004   • MS (multiple sclerosis) ([facility]) 2007       Past surgical history:   Past Surgical History:   Procedure Laterality Date   • ABDOMINAL HYSTERECTOMY TOTAL     • CHOLECYSTECTOMY     • FOOT SURGERY      left foot reattached       Family history:   Family History   Problem Relation Age of Onset   • Stroke Mother    • Clotting Disorder Father    • Thyroid Sister    • Thyroid Sister    • Diabetes Sister    • Thyroid Sister    • Thyroid Sister    • Thyroid Sister    • Thyroid Sister        Social history:   Social History     Socioeconomic History   • Marital status:      Spouse name: Not on file   • Number of children: Not on file   • Years of education: Not on file   • Highest education level: Not on file   Occupational History   • Not on file   Social Needs   • Financial resource strain: Not on file   • Food insecurity      Worry: Not on file     Inability: Not on file   • Transportation needs     Medical: Not on file     Non-medical: Not on file   Tobacco Use   • Smoking status: Former Smoker     Types: Cigarettes     Last attempt to quit: 2000     Years since quittin.5   • Smokeless tobacco: Never Used   Substance and Sexual Activity   • Alcohol use: Not Currently     Comment: Occasionally   • Drug use: No   • Sexual activity: Yes   Lifestyle   • Physical activity     Days per week: Not on file     Minutes per session: Not on file   • Stress: Not on file   Relationships   • Social connections     Talks on phone: Not on file     Gets together: Not on file     Attends [employer] service: Not on file     Active member of club or organization: Not on file     Attends meetings of clubs or organizations: Not on file     Relationship status: Not on file   • Intimate partner violence     Fear of current or ex partner: Not on file     Emotionally abused: Not on file     Physically abused: Not on file     Forced sexual activity: Not on file   Other Topics Concern   • Not on file   Social History Narrative   • Not on file       Current medications:   Current Outpatient Medications   Medication   • FLUoxetine (PROZAC) 10 MG Cap   • temazepam (RESTORIL) 15 MG Cap   • Multiple Vitamins-Minerals (MULTIVITAMIN ADULT PO)   • Iron Carbonyl-Vitamin C-FOS (CHEWABLE IRON PO)   • modafinil (PROVIGIL) 200 MG Tab   • INTRAROSA 6.5 MG INSERT   • temazepam (RESTORIL) 30 MG capsule   • B Complex Vitamins (VITAMIN B COMPLEX PO)   • Ascorbic Acid (VITAMIN C) 1000 MG Tab   • Omega 3-6-9 Fatty Acids (OMEGA 3-6-9 COMPLEX PO)   • vitamin D (CHOLECALCIFEROL) 1000 UNIT Tab   • Misc. Devices (VAGINAL SUPPOSITORY APPLICATOR) Misc   • NALTREXONE HCL PO   • SYNTHROID 150 MCG Tab   • liothyronine (CYTOMEL) 5 MCG Tab   • nortriptyline (PAMELOR) 25 MG Cap   • Clemastine Fumarate 2.68 MG Tab     No current facility-administered medications for this visit.   "      Medication Allergy:  No Known Allergies    Review of systems:   Constitutional: denies fever, night sweats, weight loss.   Eyes: denies acute vision change, eye pain or secretion.   Ears, Nose, Mouth, Throat: denies nasal secretion, nasal bleeding, difficulty swallowing, hearing loss, tinnitus, vertigo, ear pain, acute dental problems, oral ulcers or lesions.   Endocrine: denies recent weight changes, heat or cold intolerance, polyuria, polydypsia, polyphagia,abnormal hair growth.  Cardiovascular: denies new onset of chest pain, palpitations, syncope, or dyspnea of exertion.  Pulmonary: denies shortness of breath, new onset of cough, hemoptysis, wheezing, chest pain or flu-like symptoms.   GI: denies nausea, vomiting, diarrhea, GI bleeding, change in appetite, abdominal pain, and change in bowel habits.  : denies dysuria, urinary incontinence, hematuria.  Heme/oncology: denies history of easy bruising or bleeding. No history of cancer, DVTor PE.  Allergy/immunology: denies hives/urticaria, or itching.   Dermatologic: denies new rash, or new skin lesions.  Musculoskeletal:denies joint swelling or pain, muscle pain, neck and back pain. Neurologic: denies headaches, acute visual changes, facial droopiness, muscle weakness (focal or generalized), paresthesias, anesthesia, ataxia, change in speech or language, memory loss, abnormal movements, seizures, loss of consciousness, or episodes of confusion.   Psychiatric: denies symptoms of depression, anxiety, hallucinations, mood swings or changes, suicidal or homicidal thoughts.     Physical examination:   Vitals:    07/21/20 1438   BP: 118/76   BP Location: Left arm   Patient Position: Sitting   BP Cuff Size: Adult   Pulse: 87   Temp: 36.9 °C (98.5 °F)   TempSrc: Temporal   SpO2: 99%   Weight: 82 kg (180 lb 12.4 oz)   Height: 1.626 m (5' 4\")     General: Patient in no acute distress, pleasant and cooperative.  HEENT: Normocephalic, no signs of acute trauma.   Neck: " supple, no meningeal signs or carotid bruits. There is normal range of motion. No tenderness on exam.   Chest: clear to auscultation. No cough.   CV: RRR, no murmurs.   Skin: no signs of acute rashes or trauma.   Musculoskeletal: joints exhibit full range of motion, without any pain to palpation. There are no signs of joint or muscle swelling. There is no tenderness to deep palpation of muscles.   Psychiatric: No hallucinatory behavior. Denies symptoms of depression or suicidal ideation. Mood and affect appear normal on exam.     NEUROLOGICAL EXAM:   Mental status, orientation: Awake, alert and fully oriented.   Speech and language: speech is clear and fluent. The patient is able to name, repeat and comprehend.   Memory: There is intact recollection of recent and remote events.   Cranial nerve exam: Pupils are 3-4 mm bilaterally and equally reactive to light and accommodation. Visual fields are intact by confrontation. Fundoscopic exam was unremarkable. There is no nystagmus on primary or secondary gaze. Intact full EOM in all directions of gaze. Face appears symmetric. Sensation in the face is intact to light touch. Uvula is midline. Palate elevates symmetrically. Tongue is midline and without any signs of tongue biting or fasciculations. Sternocleidomastoid muscles exhibit is normal strength bilaterally. Shoulder shrug is intact bilaterally.   Motor exam: Strength is 5/5 in all extremities. Tone is normal. No abnormal movements were seen on exam.   Sensory exam reveals normal sense of light touch, proprioception, vibration and pinprick in all extremities.   Deep tendon reflexes:  2+ throughout. Plantar responses are flexor. There is no clonus.   Coordination: shows a normal finger-nose-finger. Normal rapidly alternating movements.   Gait: The patient was able to get up from seated position on first attempt without requiring assistance. Found to be steady when walking. Movements were fluid with normal arm swing.  The patient was able to turn without difficulties or tendency to fall. Romberg examination       ANCILLARY DATA REVIEWED:     Lab Data Review:  No results found for this or any previous visit (from the past 24 hour(s)).    Records reviewed: I reviewed previous H&H and all of her primary care visits and anemia was not assessed.      Imaging: I reviewed patient's last MRI scan of the brain, cervical and thoracic spine which were done in the PACS system in July 2019.          ASSESSMENT AND PLAN:    1. Multiple sclerosis (HCC)  Plan to continue Ocrevus.  Patient states that she usually does well with her infusions.  She has no recent infections.  Patient does state that she has had worsening of her gait.  Patient states that she needs to continue to work on her healthy diet.    Pending patient's response to Ocrevus consider MRIs at next visit if she still weak in the legs.    2.  Reactive depression    Try low-dose Prozac 10 mg to see if that helps taken in the morning      3 primary insomnia    I refilled patient's temazepam    FOLLOW-UP:   2 months     I spent 45 minutes with this patient, over fifty percent was spent counseling patient on their condition, best management practices, reviewing test results and risks and benefits of treatment.          EDUCATION AND COUNSELING:  -Discussed regular exercise program and prevention of cardiovascular disease, including stroke.   -Discussed healthy lifestyle, including: healthy diet (rich in fruits, vegetables, nuts and healthy oils); proper hydration, and adequate sleep hygiene (allowing 7-8 hrs of overnight sleep).        Melissa Bloch, MD  Clinical  of Neurology [facility] [employer].   Diplomate in Neurology.   Office: [phone]  Fax: [phone]

## 2020-07-22 NOTE — ASSESSMENT & PLAN NOTE
Patient felt that the Wellbutrin made her somewhat anxious and did not help with her mood so she stopped it.  Patient has had a lot of loss in the last year with her in-law,  and dog dying.  Patient does have a good support group but sometimes it gets to her.

## 2020-09-05 ENCOUNTER — TELEMEDICINE (OUTPATIENT)
Dept: TELEHEALTH | Facility: TELEMEDICINE | Age: 55
End: 2020-09-05
Payer: COMMERCIAL

## 2020-09-05 DIAGNOSIS — M62.830 BACK SPASM: ICD-10-CM

## 2020-09-05 PROCEDURE — 99213 OFFICE O/P EST LOW 20 MIN: CPT | Mod: 95,CR | Performed by: PHYSICIAN ASSISTANT

## 2020-09-05 RX ORDER — CYCLOBENZAPRINE HCL 10 MG
10 TABLET ORAL 3 TIMES DAILY PRN
Qty: 30 TAB | Refills: 0 | Status: SHIPPED | OUTPATIENT
Start: 2020-09-05 | End: 2020-09-15

## 2020-09-05 NOTE — LETTER
September 5, 2020         Patient: Heaven Chavis   YOB: 1965   Date of Visit: 9/5/2020           To Whom it May Concern:    Heaven Chavis was seen in my clinic on 9/5/2020. Please excuse her from work 9/4-9/6/2020.  If you have any questions or concerns, please don't hesitate to call.        Sincerely,           Moises Mcdonald P.A.-C.  Electronically Signed

## 2020-09-05 NOTE — PROGRESS NOTES
Telemedicine Visit: Established Patient     This evaluation was conducted via Zoom, using secure and encrypted videoconferencing technology.  The patient was physical located at Home in [place], [place] and the physician was located in [facility].  The patient was presented by self, at home.  The patient's identity was confirmed and verbal consent for the telemedicine encounter was obtained.      Subjective:   HPI:    Heaven Chavis is a 55 y.o. female presenting for evaluation and management of back spasm.  She states that she has recurrent back spasms and bent over triggering an episode yesterday.  She has tried baclofen with no relief.      ROS   Constitutional: No fever, fatigue, chills or malaise.  HENT: No headache, otalgia, sinus congestion, or sore throat.  Eyes: No change in vision, eye pain, drainage, or redness.  Cardiovascular: No palpitations    Pulmonary/Chest:  No cough, SOB, or chest pain.  Musculoskeletal: Low back pain without radiation.  Neurological: No loss of consciousness  Skin: No rash or itching  Psychiatric: No anxiety or insomnia       No Known Allergies    Current medicines (including changes today)  Current Outpatient Medications   Medication Sig Dispense Refill   • cyclobenzaprine (FLEXERIL) 10 MG Tab Take 1 Tab by mouth 3 times a day as needed for Moderate Pain or Muscle Spasms for up to 10 days. 30 Tab 0   • FLUoxetine (PROZAC) 10 MG Cap Take 1 Cap by mouth every day. 30 Cap 11   • Multiple Vitamins-Minerals (MULTIVITAMIN ADULT PO) Take  by mouth.     • Iron Carbonyl-Vitamin C-FOS (CHEWABLE IRON PO) Take  by mouth.     • modafinil (PROVIGIL) 200 MG Tab      • INTRAROSA 6.5 MG INSERT INSERT 1 TABLET VAGINALLY NIGHTLY AT BEDTIME  3   • temazepam (RESTORIL) 30 MG capsule Takes 30 mg at bedtime     • B Complex Vitamins (VITAMIN B COMPLEX PO) Take  by mouth.     • Ascorbic Acid (VITAMIN C) 1000 MG Tab Take  by mouth.     • Omega 3-6-9 Fatty Acids (OMEGA 3-6-9 COMPLEX PO) Take   by mouth.     • vitamin D (CHOLECALCIFEROL) 1000 UNIT Tab Take 1,000 Units by mouth every day. 1,000 to 3,000 IUs a day     • Misc. Devices (VAGINAL SUPPOSITORY APPLICATOR) Misc by Does not apply route.     • NALTREXONE HCL PO 3.5 Capsule.  6   • SYNTHROID 150 MCG Tab Take 150 mcg by mouth every day.  11   • liothyronine (CYTOMEL) 5 MCG Tab TAKE 2 TABLETS BY MOUTH EVERY MORNING & TAKE 1 TABLET IN THE EVENING  11   • nortriptyline (PAMELOR) 25 MG Cap TAKE 1-2 CAPSULES BY MOUTH EVERY EVENING  11   • Clemastine Fumarate 2.68 MG Tab Take  by mouth 3 times a day. Indications: taking at bed time, rx causes drowsiness       No current facility-administered medications for this visit.        Patient Active Problem List    Diagnosis Date Noted   • Reactive depression 05/20/2020   • Iron deficiency anemia 05/19/2020   • Multiple sclerosis (HCC) 03/12/2019   • Postoperative hypothyroidism 03/12/2019   • Eczema 03/12/2019       Family History   Problem Relation Age of Onset   • Stroke Mother    • Clotting Disorder Father    • Thyroid Sister    • Thyroid Sister    • Diabetes Sister    • Thyroid Sister    • Thyroid Sister    • Thyroid Sister    • Thyroid Sister        She  has a past medical history of Hashimoto's disease (2004) and MS (multiple sclerosis) ([facility]) (2007).  She  has a past surgical history that includes foot surgery; cholecystectomy; and abdominal hysterectomy total.    Medications, Allergies, and current problem list reviewed today in Epic       Objective:   There were no vitals taken for this visit.    Physical Exam:  Constitutional: Alert, no distress, well-groomed.  Skin: No rashes in visible areas.  Eye: Round. Conjunctiva clear, lids normal. No icterus.   ENMT: Lips pink without lesions, good dentition, moist mucous membranes. Phonation normal.  Neck: No masses, no thyromegaly. Moves freely without pain.  CV: Pulse as reported by patient  Respiratory: Unlabored respiratory effort, no cough or audible  wheeze  Psych: Alert and oriented x3, normal affect and mood.       Assessment and Plan:   The following treatment plan was discussed:     1. Back spasm  - cyclobenzaprine (FLEXERIL) 10 MG Tab; Take 1 Tab by mouth 3 times a day as needed for Moderate Pain or Muscle Spasms for up to 10 days.  Dispense: 30 Tab; Refill: 0        Differential diagnosis, natural history, supportive care discussed. Follow-up with primary care provider within 7-10 days, emergency room precautions discussed.  Patient and/or family appears understanding of information.  Handout and review of patients diagnosis and treatment was discussed extensively.

## 2020-09-10 ENCOUNTER — HOSPITAL ENCOUNTER (OUTPATIENT)
Dept: RADIOLOGY | Facility: MEDICAL CENTER | Age: 55
End: 2020-09-10
Attending: PSYCHIATRY & NEUROLOGY
Payer: COMMERCIAL

## 2020-09-10 DIAGNOSIS — G35 MULTIPLE SCLEROSIS (HCC): ICD-10-CM

## 2020-09-10 PROCEDURE — 700117 HCHG RX CONTRAST REV CODE 255: Performed by: PSYCHIATRY & NEUROLOGY

## 2020-09-10 PROCEDURE — 70553 MRI BRAIN STEM W/O & W/DYE: CPT

## 2020-09-10 PROCEDURE — A9576 INJ PROHANCE MULTIPACK: HCPCS | Performed by: PSYCHIATRY & NEUROLOGY

## 2020-09-10 RX ADMIN — GADOTERIDOL 15 ML: 279.3 INJECTION, SOLUTION INTRAVENOUS at 14:02

## 2020-09-11 ENCOUNTER — OFFICE VISIT (OUTPATIENT)
Dept: NEUROLOGY | Facility: MEDICAL CENTER | Age: 55
End: 2020-09-11
Payer: COMMERCIAL

## 2020-09-11 VITALS
BODY MASS INDEX: 30.73 KG/M2 | SYSTOLIC BLOOD PRESSURE: 116 MMHG | WEIGHT: 180 LBS | HEART RATE: 84 BPM | HEIGHT: 64 IN | DIASTOLIC BLOOD PRESSURE: 78 MMHG | OXYGEN SATURATION: 98 % | TEMPERATURE: 98.1 F

## 2020-09-11 DIAGNOSIS — G35 MULTIPLE SCLEROSIS (HCC): ICD-10-CM

## 2020-09-11 PROCEDURE — 99214 OFFICE O/P EST MOD 30 MIN: CPT | Performed by: REGISTERED NURSE

## 2020-09-11 RX ORDER — BACLOFEN 10 MG/1
TABLET ORAL 3 TIMES DAILY
COMMUNITY
End: 2020-12-10

## 2020-09-11 RX ORDER — BACLOFEN 10 MG/1
10 TABLET ORAL 3 TIMES DAILY
Qty: 90 TAB | Refills: 5 | Status: SHIPPED | OUTPATIENT
Start: 2020-09-11 | End: 2021-03-12 | Stop reason: SDUPTHER

## 2020-09-11 ASSESSMENT — FIBROSIS 4 INDEX: FIB4 SCORE: 0.91

## 2020-09-11 NOTE — PROGRESS NOTES
Here for family medical leave for MS.  Paper work filled out.    Chief Complaint   Patient presents with   • Follow-Up     MS and FMLA paperwork       Problem List Items Addressed This Visit     Multiple sclerosis (HCC)     Here for FMLA paperwork         Relevant Medications    baclofen (LIORESAL) 10 MG Tab    Other Relevant Orders    CBC WITH DIFFERENTIAL    Comp Metabolic Panel    IMMUNOGLOBULINS A/G/M SERUM          History of present illness:  Heaven Chavis 55 y.o. female presents today for FML paper work for work.  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  C/O today of muscle spasms and is also asking for a refill for her baclofen.    Currently on Ocrevus and doing well on it.    Prior brain imaging: Yes    Currently employed: Yes    Past medical history:   Past Medical History:   Diagnosis Date   • Hashimoto's disease 2004   • MS (multiple sclerosis) ([facility]) 2007       Past surgical history:   Past Surgical History:   Procedure Laterality Date   • ABDOMINAL HYSTERECTOMY TOTAL     • CHOLECYSTECTOMY     • FOOT SURGERY      left foot reattached       Family history:   Family History   Problem Relation Age of Onset   • Stroke Mother    • Clotting Disorder Father    • Thyroid Sister    • Thyroid Sister    • Diabetes Sister    • Thyroid Sister    • Thyroid Sister    • Thyroid Sister    • Thyroid Sister        Social history:   Social History     Socioeconomic History   • Marital status: Other     Spouse name: Not on file   • Number of children: Not on file   • Years of education: Not on file   • Highest education level: Not on file   Occupational History   • Not on file   Social Needs   • Financial resource strain: Not on file   • Food insecurity     Worry: Not on file     Inability: Not on file   • Transportation needs     Medical: Not on file     Non-medical: Not on file   Tobacco Use   • Smoking status: Former Smoker     Types:  Cigarettes     Quit date:      Years since quittin.7   • Smokeless tobacco: Never Used   Substance and Sexual Activity   • Alcohol use: Not Currently     Comment: Occasionally   • Drug use: No   • Sexual activity: Yes   Lifestyle   • Physical activity     Days per week: Not on file     Minutes per session: Not on file   • Stress: Not on file   Relationships   • Social connections     Talks on phone: Not on file     Gets together: Not on file     Attends [employer] service: Not on file     Active member of club or organization: Not on file     Attends meetings of clubs or organizations: Not on file     Relationship status: Not on file   • Intimate partner violence     Fear of current or ex partner: Not on file     Emotionally abused: Not on file     Physically abused: Not on file     Forced sexual activity: Not on file   Other Topics Concern   • Not on file   Social History Narrative   • Not on file       Current medications:   Current Outpatient Medications   Medication   • baclofen (LIORESAL) 10 MG Tab   • baclofen (LIORESAL) 10 MG Tab   • Multiple Vitamins-Minerals (MULTIVITAMIN ADULT PO)   • Iron Carbonyl-Vitamin C-FOS (CHEWABLE IRON PO)   • modafinil (PROVIGIL) 200 MG Tab   • INTRAROSA 6.5 MG INSERT   • temazepam (RESTORIL) 30 MG capsule   • B Complex Vitamins (VITAMIN B COMPLEX PO)   • Ascorbic Acid (VITAMIN C) 1000 MG Tab   • Omega 3-6-9 Fatty Acids (OMEGA 3-6-9 COMPLEX PO)   • vitamin D (CHOLECALCIFEROL) 1000 UNIT Tab   • NALTREXONE HCL PO   • SYNTHROID 150 MCG Tab   • liothyronine (CYTOMEL) 5 MCG Tab   • nortriptyline (PAMELOR) 25 MG Cap   • Clemastine Fumarate 2.68 MG Tab   • cyclobenzaprine (FLEXERIL) 10 MG Tab   • FLUoxetine (PROZAC) 10 MG Cap   • Misc. Devices (VAGINAL SUPPOSITORY APPLICATOR) Misc     No current facility-administered medications for this visit.        Medication Allergy:  No Known Allergies    Review of systems:   ROS:   Constitutional: No fevers or chills.  Eyes: No blurry  "vision or eye pain.  ENT: No dysphagia or hearing loss.  Respiratory: No cough or shortness of breath.  Cardiovascular: No chest pain or palpitations.  GI: No nausea, vomiting, or diarrhea.  : No urinary incontinence or dysuria.  Musculoskeletal: No joint swelling or arthralgias.  Skin: No skin rashes.  Neuro: No headaches, dizziness, or tremors.  Endocrine: No heat or cold intolerance. No polydipsia or polyuria.  Psych: No depression or anxiety.  Heme/Lymph: No easy bruising or swollen lymph nodes.  All other review of systems were reviewed and were negative    Physical examination:   Vitals:    09/11/20 1253   BP: 116/78   BP Location: Left arm   Patient Position: Sitting   Pulse: 84   Temp: 36.7 °C (98.1 °F)   TempSrc: Temporal   SpO2: 98%   Weight: 81.6 kg (180 lb)   Height: 1.626 m (5' 4\")     General: Patient in no acute distress, pleasant and cooperative.  HEENT: Normocephalic, no signs of acute trauma.   Neck: supple, no meningeal signs or carotid bruits. There is normal range of motion. No tenderness on exam.   Chest: clear to auscultation. No cough.   CV: RRR, no murmurs.   Abdomen: soft, non distended or tender.   Skin: no signs of acute rashes or trauma.   Musculoskeletal: joints exhibit full range of motion, without any pain to palpation. There are no signs of joint or muscle swelling. There is no tenderness to deep palpation of muscles.   Psychiatric: No hallucinatory behavior. Denies symptoms of depression or suicidal ideation. Mood and affect appear normal on exam.     NEUROLOGY EXAM  Eyes: Sclera anicteric.  Neurologic:              Mental Status: Awake, alert, oriented x 3.              Speech: Fluent with normal prosody.              Memory: Able to recall recent and remote events accurately.               Concentration: Attentive. Able to focus on history and follow multi-step commands.              Fund of Knowledge: Appropriate.              Cranial Nerves:                          CN II: " PERRL. No afferent pupillary defect.                          CN III, IV, VI: Good eye closure, EOMI.                           CN V: Facial sensation intact and symmetric.                           CN VII: No facial asymmetry.                           CN VIII: Hearing intact.                           CN IX and X: Palate elevates symmetrically. Normal gag reflex.                          CN XI: Symmetric shoulder shrug.                           CN XII: Tongue midline.               Sensory:               Coordination: No evidence of past-pointing on finger to nose testing, no dysdiadochokinesia. Heel to shin intact.               DTR's: 2+ throughout without clonus.               Babinski: Toes down going bilaterally.              Romberg: Negative.              Movements: No tremors observed.   Musculoskeletal:               Strength: 5/5 in upper and lower extremities bilaterally.              Gait: Steady, narrow based.               Tone: Normal bulk and tone.              Joints: No swelling.    ANCILLARY DATA REVIEWED:      Lab Data Review:  No results found for this or any previous visit (from the past 24 hour(s)).    Imaging Reviewed:  IMPRESSION: 07/19/2020 Reviewed with patient     1.  Scattered foci of abnormal white matter signal consistent with history of demyelinating disease, overall unchanged from prior exam dated 7/19/2019.  2.  No new or enhancing lesions to indicate active demyelination.    ASSESSMENT AND PLAN:    Heaven Chavis is a 55 year old female with a history of MS here today for [employer] paper work and just had her MRI's done yesterday.  No new lesions noted.  No new symptoms verbalized.      1. Multiple sclerosis (HCC)    - baclofen (LIORESAL) 10 MG Tab; Take 1 Tab by mouth 3 times a day.  Dispense: 90 Tab; Refill: 5  - CBC WITH DIFFERENTIAL; Future  - Comp Metabolic Panel; Future  - IMMUNOGLOBULINS A/G/M SERUM; Future  -Lymphocyte subsets    - baclofen (LIORESAL) 10 MG Tab; Take 1 Tab  by mouth 3 times a day.  Dispense: 90 Tab; Refill: 5      Patient was seen for 60 minutes face to face of which > 50% of appointment time was spent on counseling and coordination of care regarding the above.      FOLLOW-UP:   After Ocrevus infusion    EDUCATION AND COUNSELING:  The patient was educated on diagnosis and prognosis. I re-interated that MS is a chronic progressive disorder for which there is currently no cure. Despite best efforts in management, the condition is likely to progress and carries significant risk for disability including physical and cognitive.     Effectiveness, indications,side effects, and safety profile of available Disease Modifying Agents (DMA’s) reviewed.     Discussed importance of regular exercise program.  I stressed the importance of sleep hygiene.    The patient will require frequent follow up including:  Laboratory monitoring -Imaging of entire neuro-axis (brain and spinal cord) with MRI’s w/wo contrast, every year and prn   Patient/family counseled on medication compliance.     The patient understands and agrees that due to the complexity of his/her diagnosis, results of any testing and further recommendations will typically be discussed/made during a face to face encounter in my office. The patient and/or family further understands it is their responsibility to keep proper follow up.       Heaven Quintanilla NP-C  Neurology

## 2020-09-22 ENCOUNTER — HOSPITAL ENCOUNTER (OUTPATIENT)
Dept: LAB | Facility: MEDICAL CENTER | Age: 55
End: 2020-09-22
Attending: REGISTERED NURSE
Payer: COMMERCIAL

## 2020-09-22 DIAGNOSIS — G35 MULTIPLE SCLEROSIS (HCC): ICD-10-CM

## 2020-09-22 LAB
ALBUMIN SERPL BCP-MCNC: 4.5 G/DL (ref 3.2–4.9)
ALBUMIN/GLOB SERPL: 1.7 G/DL
ALP SERPL-CCNC: 125 U/L (ref 30–99)
ALT SERPL-CCNC: 20 U/L (ref 2–50)
ANION GAP SERPL CALC-SCNC: 11 MMOL/L (ref 7–16)
AST SERPL-CCNC: 22 U/L (ref 12–45)
BASOPHILS # BLD AUTO: 0.9 % (ref 0–1.8)
BASOPHILS # BLD: 0.05 K/UL (ref 0–0.12)
BILIRUB SERPL-MCNC: 0.3 MG/DL (ref 0.1–1.5)
BUN SERPL-MCNC: 19 MG/DL (ref 8–22)
CALCIUM SERPL-MCNC: 9.5 MG/DL (ref 8.5–10.5)
CHLORIDE SERPL-SCNC: 102 MMOL/L (ref 96–112)
CO2 SERPL-SCNC: 25 MMOL/L (ref 20–33)
CREAT SERPL-MCNC: 0.71 MG/DL (ref 0.5–1.4)
EOSINOPHIL # BLD AUTO: 0.05 K/UL (ref 0–0.51)
EOSINOPHIL NFR BLD: 0.9 % (ref 0–6.9)
ERYTHROCYTE [DISTWIDTH] IN BLOOD BY AUTOMATED COUNT: 53.9 FL (ref 35.9–50)
GLOBULIN SER CALC-MCNC: 2.6 G/DL (ref 1.9–3.5)
GLUCOSE SERPL-MCNC: 100 MG/DL (ref 65–99)
HCT VFR BLD AUTO: 39.2 % (ref 37–47)
HGB BLD-MCNC: 12.4 G/DL (ref 12–16)
IMM GRANULOCYTES # BLD AUTO: 0.01 K/UL (ref 0–0.11)
IMM GRANULOCYTES NFR BLD AUTO: 0.2 % (ref 0–0.9)
LYMPHOCYTES # BLD AUTO: 1.43 K/UL (ref 1–4.8)
LYMPHOCYTES NFR BLD: 26 % (ref 22–41)
MCH RBC QN AUTO: 25.6 PG (ref 27–33)
MCHC RBC AUTO-ENTMCNC: 31.6 G/DL (ref 33.6–35)
MCV RBC AUTO: 80.8 FL (ref 81.4–97.8)
MONOCYTES # BLD AUTO: 0.48 K/UL (ref 0–0.85)
MONOCYTES NFR BLD AUTO: 8.7 % (ref 0–13.4)
NEUTROPHILS # BLD AUTO: 3.49 K/UL (ref 2–7.15)
NEUTROPHILS NFR BLD: 63.3 % (ref 44–72)
NRBC # BLD AUTO: 0 K/UL
NRBC BLD-RTO: 0 /100 WBC
PLATELET # BLD AUTO: 322 K/UL (ref 164–446)
PMV BLD AUTO: 10.4 FL (ref 9–12.9)
POTASSIUM SERPL-SCNC: 4.9 MMOL/L (ref 3.6–5.5)
PROT SERPL-MCNC: 7.1 G/DL (ref 6–8.2)
RBC # BLD AUTO: 4.85 M/UL (ref 4.2–5.4)
SODIUM SERPL-SCNC: 138 MMOL/L (ref 135–145)
WBC # BLD AUTO: 5.5 K/UL (ref 4.8–10.8)

## 2020-09-22 PROCEDURE — 82784 ASSAY IGA/IGD/IGG/IGM EACH: CPT

## 2020-09-22 PROCEDURE — 85025 COMPLETE CBC W/AUTO DIFF WBC: CPT

## 2020-09-22 PROCEDURE — 36415 COLL VENOUS BLD VENIPUNCTURE: CPT

## 2020-09-22 PROCEDURE — 80053 COMPREHEN METABOLIC PANEL: CPT

## 2020-09-25 LAB
IGA SERPL-MCNC: 197 MG/DL (ref 68–408)
IGG SERPL-MCNC: 713 MG/DL (ref 768–1632)
IGM SERPL-MCNC: 55 MG/DL (ref 35–263)

## 2020-09-30 ENCOUNTER — OFFICE VISIT (OUTPATIENT)
Dept: NEUROLOGY | Facility: MEDICAL CENTER | Age: 55
End: 2020-09-30
Payer: COMMERCIAL

## 2020-09-30 VITALS
HEIGHT: 64 IN | TEMPERATURE: 97.9 F | SYSTOLIC BLOOD PRESSURE: 112 MMHG | BODY MASS INDEX: 30.73 KG/M2 | DIASTOLIC BLOOD PRESSURE: 68 MMHG | WEIGHT: 180 LBS | OXYGEN SATURATION: 99 % | HEART RATE: 81 BPM

## 2020-09-30 DIAGNOSIS — G35 MULTIPLE SCLEROSIS (HCC): ICD-10-CM

## 2020-09-30 PROCEDURE — 7101 PR PHYSICAL: Performed by: REGISTERED NURSE

## 2020-09-30 ASSESSMENT — FIBROSIS 4 INDEX: FIB4 SCORE: 0.87

## 2020-09-30 NOTE — PROGRESS NOTES
Please see notes from 09/11/2020.  No changes in physical assessment.  Patient needed more [place] paperwork filled out.      Patient was seen for 40- minutes face to face of which > 50% of appointment time was spent on counseling and coordination of care regarding the above.

## 2020-11-22 ENCOUNTER — TELEPHONE (OUTPATIENT)
Dept: ONCOLOGY | Facility: MEDICAL CENTER | Age: 55
End: 2020-11-22

## 2020-11-22 NOTE — TELEPHONE ENCOUNTER
Called patient for pre screening questions for COVD-19 and education regarding updated policies. Ok to proceed with treatment for Monday's appointment at this time.

## 2020-11-23 ENCOUNTER — OUTPATIENT INFUSION SERVICES (OUTPATIENT)
Dept: ONCOLOGY | Facility: MEDICAL CENTER | Age: 55
End: 2020-11-23
Attending: PSYCHIATRY & NEUROLOGY
Payer: COMMERCIAL

## 2020-11-23 VITALS
WEIGHT: 195.11 LBS | BODY MASS INDEX: 33.31 KG/M2 | TEMPERATURE: 97 F | DIASTOLIC BLOOD PRESSURE: 67 MMHG | HEART RATE: 87 BPM | SYSTOLIC BLOOD PRESSURE: 133 MMHG | RESPIRATION RATE: 18 BRPM | HEIGHT: 64 IN | OXYGEN SATURATION: 95 %

## 2020-11-23 DIAGNOSIS — G35 MULTIPLE SCLEROSIS (HCC): ICD-10-CM

## 2020-11-23 PROCEDURE — 96375 TX/PRO/DX INJ NEW DRUG ADDON: CPT

## 2020-11-23 PROCEDURE — 96366 THER/PROPH/DIAG IV INF ADDON: CPT

## 2020-11-23 PROCEDURE — 96365 THER/PROPH/DIAG IV INF INIT: CPT

## 2020-11-23 PROCEDURE — 306780 HCHG STAT FOR TRANSFUSION PER CASE

## 2020-11-23 PROCEDURE — 96413 CHEMO IV INFUSION 1 HR: CPT

## 2020-11-23 PROCEDURE — 96415 CHEMO IV INFUSION ADDL HR: CPT

## 2020-11-23 PROCEDURE — 700105 HCHG RX REV CODE 258: Performed by: PSYCHIATRY & NEUROLOGY

## 2020-11-23 PROCEDURE — 700111 HCHG RX REV CODE 636 W/ 250 OVERRIDE (IP): Performed by: PSYCHIATRY & NEUROLOGY

## 2020-11-23 RX ORDER — METHYLPREDNISOLONE SODIUM SUCCINATE 125 MG/2ML
125 INJECTION, POWDER, LYOPHILIZED, FOR SOLUTION INTRAMUSCULAR; INTRAVENOUS ONCE
Status: COMPLETED | OUTPATIENT
Start: 2020-11-23 | End: 2020-11-23

## 2020-11-23 RX ADMIN — DIPHENHYDRAMINE HYDROCHLORIDE 50 MG: 50 INJECTION, SOLUTION INTRAMUSCULAR; INTRAVENOUS at 10:08

## 2020-11-23 RX ADMIN — OCRELIZUMAB 600 MG: 300 INJECTION INTRAVENOUS at 11:09

## 2020-11-23 RX ADMIN — METHYLPREDNISOLONE SODIUM SUCCINATE 125 MG: 125 INJECTION, POWDER, FOR SOLUTION INTRAMUSCULAR; INTRAVENOUS at 10:02

## 2020-11-23 ASSESSMENT — FIBROSIS 4 INDEX: FIB4 SCORE: 0.87

## 2020-11-23 ASSESSMENT — PAIN DESCRIPTION - PAIN TYPE: TYPE: CHRONIC PAIN

## 2020-11-23 NOTE — PROGRESS NOTES
Ocrevus continues to infuse.  No adverse effects observed or expressed.  Pt resting in chair.  Call light at hand.  Report to KOSTA Roe.

## 2020-11-23 NOTE — PROGRESS NOTES
Pt to infusion services ambulatory per self.  Pt here for scheduled. Ocrvus infusion.  Plan of care reviewed.  Pt verbalizes understanding.  Pt reports no issues or concerns.  Pt denies any s/sx of infection today.  Pt tells me she has tolerated her previous infusions well and without incident.  PIV established to RFA, #24G.  IV flushes easily; + blood return verified.  Pre medications administered per MD orders.  Pt resting in chair.  Call light at hand.

## 2020-11-23 NOTE — PROGRESS NOTES
1309  Report received form Payal BRAMBILA.  Pt receiving [id number] Ocrevus for MS.  Medication being administered at 160 mL/hr at this time.  Pt is resting comfortably, all needs met.  1320 Ocrevus infusion increased to max rate of 200 mL/hr for remainder of infusion.  Pt monitored for 1 hour post-infusion w/ no s/s of adverse reactions.  PIV flushed and removed, gauze and coban dressing applied.  Pt left on foot in care of self in NAD.  Pt to f/u w/ her MD prior to making next appt.

## 2020-11-23 NOTE — PROGRESS NOTES
Ocrevus administered per MD orders.  Ocrevus infusing at this time.  Infusion rate titrated per MAR for subsequent dose.  Pt resting in chair.  Call light at hand.

## 2020-12-10 ENCOUNTER — OFFICE VISIT (OUTPATIENT)
Dept: NEUROLOGY | Facility: MEDICAL CENTER | Age: 55
End: 2020-12-10
Payer: COMMERCIAL

## 2020-12-10 VITALS
DIASTOLIC BLOOD PRESSURE: 78 MMHG | OXYGEN SATURATION: 96 % | SYSTOLIC BLOOD PRESSURE: 128 MMHG | BODY MASS INDEX: 33.5 KG/M2 | HEIGHT: 64 IN | TEMPERATURE: 98 F | WEIGHT: 196.21 LBS | HEART RATE: 94 BPM

## 2020-12-10 DIAGNOSIS — R00.2 INTERMITTENT PALPITATIONS: ICD-10-CM

## 2020-12-10 DIAGNOSIS — G35 MULTIPLE SCLEROSIS (HCC): ICD-10-CM

## 2020-12-10 DIAGNOSIS — R00.0 TACHYCARDIA: ICD-10-CM

## 2020-12-10 PROCEDURE — 99214 OFFICE O/P EST MOD 30 MIN: CPT | Performed by: PSYCHIATRY & NEUROLOGY

## 2020-12-10 RX ORDER — NORTRIPTYLINE HYDROCHLORIDE 25 MG/1
CAPSULE ORAL
Qty: 60 CAP | Refills: 11 | Status: SHIPPED | OUTPATIENT
Start: 2020-12-10 | End: 2021-03-12 | Stop reason: SDUPTHER

## 2020-12-10 ASSESSMENT — FIBROSIS 4 INDEX: FIB4 SCORE: 0.87

## 2020-12-10 NOTE — ASSESSMENT & PLAN NOTE
Pt got the Ocrevus on 11/23 and she states that she has had recent increase in heart rate at rest. Pt states that she has been doing ok since the Ocrevus. 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 Pt is more sedentary recently. Pt states that she is not working out as much as she should. Pt is needing to see her eye doctor.

## 2020-12-29 ENCOUNTER — TELEPHONE (OUTPATIENT)
Dept: CARDIOLOGY | Facility: MEDICAL CENTER | Age: 55
End: 2020-12-29

## 2020-12-29 NOTE — TELEPHONE ENCOUNTER
Called pt at [phone] and left VM to see if she has been anywhere outside of [facility] to get labs work done or seen a prior Cardiologist. I asked that she give us a call back at [phone].

## 2021-01-06 ENCOUNTER — OFFICE VISIT (OUTPATIENT)
Dept: CARDIOLOGY | Facility: MEDICAL CENTER | Age: 56
End: 2021-01-06
Payer: COMMERCIAL

## 2021-01-06 VITALS
RESPIRATION RATE: 16 BRPM | DIASTOLIC BLOOD PRESSURE: 82 MMHG | HEART RATE: 90 BPM | OXYGEN SATURATION: 96 % | HEIGHT: 64 IN | BODY MASS INDEX: 33.29 KG/M2 | WEIGHT: 195 LBS | SYSTOLIC BLOOD PRESSURE: 130 MMHG

## 2021-01-06 DIAGNOSIS — R00.2 PALPITATIONS: ICD-10-CM

## 2021-01-06 LAB — EKG IMPRESSION: NORMAL

## 2021-01-06 PROCEDURE — 99204 OFFICE O/P NEW MOD 45 MIN: CPT | Performed by: INTERNAL MEDICINE

## 2021-01-06 PROCEDURE — 93000 ELECTROCARDIOGRAM COMPLETE: CPT | Performed by: INTERNAL MEDICINE

## 2021-01-06 ASSESSMENT — ENCOUNTER SYMPTOMS
INSOMNIA: 1
FALLS: 1
BLURRED VISION: 1
FEVER: 0
ABDOMINAL PAIN: 1
MYALGIAS: 0
NECK PAIN: 1
CHILLS: 0
DIZZINESS: 0
DEPRESSION: 1
PND: 0
SHORTNESS OF BREATH: 0
PALPITATIONS: 1
BRUISES/BLEEDS EASILY: 1
TINGLING: 1
WEAKNESS: 1
ORTHOPNEA: 0
SPEECH CHANGE: 1
EYE REDNESS: 1
LOSS OF CONSCIOUSNESS: 0

## 2021-01-06 ASSESSMENT — FIBROSIS 4 INDEX: FIB4 SCORE: 0.87

## 2021-01-06 NOTE — PROGRESS NOTES
"Chief Complaint   Patient presents with   • Palpitations       Subjective:   Heaven Chavis is a 55 y.o. female who presents today referred by Melissa P. Bloch MD, neurologist for cardiology consultation for evaluation of palpitations and tachycardia.    The patient has a past medical history multiple sclerosis diagnosed  on infusion chemotherapy with monthly Ocrevus infusion, thyroid nodule, S/P total thyroidectomy  on chronic supplementation, depression and chronic insomnia.    The patient reports that beginning last month for the past 4 weeks she developed episodic brief periods of rapid palpitations lasting seconds initially occurring several times a day but which have abated in the past week.  She only actually began to perceive her symptoms when she was regularly monitoring her oxygen levels with a oximetry monitor and noted her heart rate would be somewhat elevated.  No syncope or lightheadedness.  No chest pain.  In the remote past was told of having had a heart murmur by an RN, referred to cardiologist to according the patient stated that she had no cardiac problems.  History of thyroidectomy on chronic supplementation with no recent change in dosage.  No lung disease, DVT, hypertension, dyslipidemia, hypertension and does not smoke cigarettes.  Unfortunately her   suddenly of a \"heart attack\" .    Past Medical History:   Diagnosis Date   • Hashimoto's disease    • MS (multiple sclerosis) (HCC)      Past Surgical History:   Procedure Laterality Date   • ABDOMINAL HYSTERECTOMY TOTAL     • CHOLECYSTECTOMY     • FOOT SURGERY      left foot reattached     Family History   Problem Relation Age of Onset   • Stroke Mother    • Clotting Disorder Father    • Thyroid Sister    • Thyroid Sister    • Diabetes Sister    • Thyroid Sister    • Thyroid Sister    • Thyroid Sister    • Thyroid Sister      Social History     Socioeconomic History   • Marital status: Other     Spouse name: Not " on file   • Number of children: Not on file   • Years of education: Not on file   • Highest education level: Not on file   Occupational History   • Not on file   Social Needs   • Financial resource strain: Not on file   • Food insecurity     Worry: Not on file     Inability: Not on file   • Transportation needs     Medical: Not on file     Non-medical: Not on file   Tobacco Use   • Smoking status: Former Smoker     Types: Cigarettes     Quit date: 2000     Years since quittin.0   • Smokeless tobacco: Never Used   Substance and Sexual Activity   • Alcohol use: Not Currently   • Drug use: No   • Sexual activity: Yes   Lifestyle   • Physical activity     Days per week: Not on file     Minutes per session: Not on file   • Stress: Not on file   Relationships   • Social connections     Talks on phone: Not on file     Gets together: Not on file     Attends [employer] service: Not on file     Active member of club or organization: Not on file     Attends meetings of clubs or organizations: Not on file     Relationship status: Not on file   • Intimate partner violence     Fear of current or ex partner: Not on file     Emotionally abused: Not on file     Physically abused: Not on file     Forced sexual activity: Not on file   Other Topics Concern   • Not on file   Social History Narrative   • Not on file     Allergies   Allergen Reactions   • Milk Products Food Allergy Hives   • Citrus Hives and Itching     Outpatient Encounter Medications as of 2021   Medication Sig Dispense Refill   • nortriptyline (PAMELOR) 25 MG Cap TAKE 1-2 CAPSULES BY MOUTH EVERY EVENING 60 Cap 11   • baclofen (LIORESAL) 10 MG Tab Take 1 Tab by mouth 3 times a day. 90 Tab 5   • FLUoxetine (PROZAC) 10 MG Cap Take 1 Cap by mouth every day. 30 Cap 11   • Multiple Vitamins-Minerals (MULTIVITAMIN ADULT PO) Take  by mouth.     • Iron Carbonyl-Vitamin C-FOS (CHEWABLE IRON PO) Take  by mouth.     • modafinil (PROVIGIL) 200 MG Tab      • temazepam  "(RESTORIL) 30 MG capsule Takes 30 mg at bedtime     • B Complex Vitamins (VITAMIN B COMPLEX PO) Take  by mouth.     • Ascorbic Acid (VITAMIN C) 1000 MG Tab Take  by mouth.     • Omega 3-6-9 Fatty Acids (OMEGA 3-6-9 COMPLEX PO) Take  by mouth.     • vitamin D (CHOLECALCIFEROL) 1000 UNIT Tab Take 1,000 Units by mouth every day. 1,000 to 3,000 IUs a day     • NALTREXONE HCL PO 3.5 Capsule.  6   • SYNTHROID 150 MCG Tab Take 150 mcg by mouth every day.  11   • liothyronine (CYTOMEL) 5 MCG Tab TAKE 2 TABLETS BY MOUTH EVERY MORNING & TAKE 1 TABLET IN THE EVENING  11   • INTRAROSA 6.5 MG INSERT INSERT 1 TABLET VAGINALLY NIGHTLY AT BEDTIME  3     No facility-administered encounter medications on file as of 1/6/2021.      Review of Systems   Constitutional: Negative for chills and fever.   HENT: Positive for nosebleeds and tinnitus. Negative for congestion.    Eyes: Positive for blurred vision and redness.   Respiratory: Negative for shortness of breath.    Cardiovascular: Positive for palpitations and leg swelling. Negative for chest pain, orthopnea and PND.   Gastrointestinal: Positive for abdominal pain.   Genitourinary: Positive for frequency. Negative for dysuria.   Musculoskeletal: Positive for falls and neck pain. Negative for joint pain and myalgias.   Skin: Positive for itching. Negative for rash.   Neurological: Positive for tingling, speech change and weakness. Negative for dizziness and loss of consciousness.   Endo/Heme/Allergies: Bruises/bleeds easily.   Psychiatric/Behavioral: Positive for depression. The patient has insomnia.         Objective:   /82 (BP Location: Left arm, Patient Position: Sitting, BP Cuff Size: Adult)   Pulse 90   Resp 16   Ht 1.626 m (5' 4\")   Wt 88.5 kg (195 lb)   SpO2 96%   BMI 33.47 kg/m²     Physical Exam   Constitutional: She is oriented to person, place, and time. She appears well-developed and well-nourished. No distress.   Eyes: Pupils are equal, round, and reactive to " light. Conjunctivae and EOM are normal.   Neck: Normal range of motion. Neck supple. No JVD present.   Cardiovascular: Normal rate, regular rhythm, normal heart sounds and intact distal pulses. Exam reveals no gallop and no friction rub.   No murmur heard.  Pulses:       Carotid pulses are 2+ on the right side and 2+ on the left side.  Pulmonary/Chest: Effort normal and breath sounds normal. No accessory muscle usage. No respiratory distress. She has no wheezes. She has no rales.   Abdominal: Soft. She exhibits no distension and no mass. There is no hepatosplenomegaly. There is no abdominal tenderness.   Musculoskeletal:         General: No edema.   Neurological: She is alert and oriented to person, place, and time.   Skin: Skin is warm, dry and intact. No rash noted. No cyanosis. Nails show no clubbing.   Psychiatric: She has a normal mood and affect. Her behavior is normal.   Vitals reviewed.    EKG 01/06/2021 normal sinus rhythm rate 76 within normal limits.  Personally reviewed/interpreted.    Assessment:     1. Palpitations  EKG       Medical Decision Making:  Today's Assessment / Status / Plan:     Assessment:  1.  Palpitations.  2.  Multiple sclerosis.  3.  Hypothyroidism S/P thyroidectomy 2010.  4.  Adult situational stress syndrome.  5.  Chronic insomnia.    Recommendation Discussion  1.  Cardiac event recorder.  2.  Echocardiogram.  3.  Recheck thyroid function test.  4.  Further recommendations based on above evaluation.    Thank you for allow me see this patient in consultation.

## 2021-02-05 ENCOUNTER — HOSPITAL ENCOUNTER (OUTPATIENT)
Dept: CARDIOLOGY | Facility: MEDICAL CENTER | Age: 56
End: 2021-02-05
Attending: INTERNAL MEDICINE
Payer: COMMERCIAL

## 2021-02-05 DIAGNOSIS — R00.2 PALPITATIONS: ICD-10-CM

## 2021-02-05 LAB
LV EJECT FRACT  99904: 65
LV EJECT FRACT MOD 2C 99903: 59.95
LV EJECT FRACT MOD 4C 99902: 73.86
LV EJECT FRACT MOD BP 99901: 67.11

## 2021-02-05 PROCEDURE — 93306 TTE W/DOPPLER COMPLETE: CPT

## 2021-02-05 PROCEDURE — 93306 TTE W/DOPPLER COMPLETE: CPT | Mod: 26 | Performed by: INTERNAL MEDICINE

## 2021-02-09 ENCOUNTER — TELEPHONE (OUTPATIENT)
Dept: CARDIOLOGY | Facility: MEDICAL CENTER | Age: 56
End: 2021-02-09

## 2021-02-11 NOTE — TELEPHONE ENCOUNTER
Called patient back, relayed results and confirmed upcoming appt for cardiac monitor.  Answered all questions and concerns.

## 2021-02-11 NOTE — TELEPHONE ENCOUNTER
Patient called back. Please reach back out to her. Ok to leave detailed message.     Thank you,  Su MEHTA

## 2021-02-16 ENCOUNTER — HOSPITAL ENCOUNTER (OUTPATIENT)
Dept: LAB | Facility: MEDICAL CENTER | Age: 56
End: 2021-02-16
Attending: INTERNAL MEDICINE
Payer: COMMERCIAL

## 2021-02-16 LAB
T3FREE SERPL-MCNC: 2.75 PG/ML (ref 2–4.4)
T4 FREE SERPL-MCNC: 1.55 NG/DL (ref 0.93–1.7)
TSH SERPL DL<=0.005 MIU/L-ACNC: 0.02 UIU/ML (ref 0.38–5.33)

## 2021-02-16 PROCEDURE — 84443 ASSAY THYROID STIM HORMONE: CPT

## 2021-02-16 PROCEDURE — 84481 FREE ASSAY (FT-3): CPT

## 2021-02-16 PROCEDURE — 84439 ASSAY OF FREE THYROXINE: CPT

## 2021-02-16 PROCEDURE — 36415 COLL VENOUS BLD VENIPUNCTURE: CPT

## 2021-02-19 ENCOUNTER — TELEPHONE (OUTPATIENT)
Dept: CARDIOLOGY | Facility: MEDICAL CENTER | Age: 56
End: 2021-02-19

## 2021-02-19 ENCOUNTER — NON-PROVIDER VISIT (OUTPATIENT)
Dept: CARDIOLOGY | Facility: MEDICAL CENTER | Age: 56
End: 2021-02-19
Payer: COMMERCIAL

## 2021-02-19 DIAGNOSIS — I49.1 PAC (PREMATURE ATRIAL CONTRACTION): ICD-10-CM

## 2021-02-19 DIAGNOSIS — R00.2 PALPITATIONS: ICD-10-CM

## 2021-02-19 PROCEDURE — 93268 ECG RECORD/REVIEW: CPT | Performed by: INTERNAL MEDICINE

## 2021-02-19 NOTE — TELEPHONE ENCOUNTER
Patient enrolled in the 5 day [facility] monitoring program per .  Monitor to be shipped to patient by [employer].  >Pending Baseline.  >Pending EOS.

## 2021-03-05 ENCOUNTER — TELEPHONE (OUTPATIENT)
Dept: CARDIOLOGY | Facility: MEDICAL CENTER | Age: 56
End: 2021-03-05

## 2021-03-06 NOTE — TELEPHONE ENCOUNTER
Please notify the patient that her cardiac event recorder is completely normal.  Echocardiogram is normal.  I do not see any cardiac problems or findings at this time that would warrant further evaluation or any treatment.  Would follow-up as needed.

## 2021-03-12 DIAGNOSIS — G35 MULTIPLE SCLEROSIS (HCC): ICD-10-CM

## 2021-03-12 DIAGNOSIS — R53.82 CHRONIC FATIGUE: ICD-10-CM

## 2021-03-12 RX ORDER — NALTREXONE HYDROCHLORIDE 50 MG/1
50 TABLET, FILM COATED ORAL DAILY
Qty: 30 TABLET | Refills: 6 | Status: SHIPPED | OUTPATIENT
Start: 2021-03-12 | End: 2021-11-29

## 2021-03-12 RX ORDER — BACLOFEN 10 MG/1
10 TABLET ORAL 3 TIMES DAILY
Qty: 90 TABLET | Refills: 5 | Status: SHIPPED | OUTPATIENT
Start: 2021-03-12 | End: 2021-05-20 | Stop reason: SDUPTHER

## 2021-03-12 RX ORDER — FLUOXETINE 10 MG/1
10 CAPSULE ORAL DAILY
Qty: 30 CAPSULE | Refills: 11 | Status: SHIPPED | OUTPATIENT
Start: 2021-03-12 | End: 2021-05-20 | Stop reason: SDUPTHER

## 2021-03-12 RX ORDER — NORTRIPTYLINE HYDROCHLORIDE 25 MG/1
CAPSULE ORAL
Qty: 60 CAPSULE | Refills: 11 | Status: SHIPPED | OUTPATIENT
Start: 2021-03-12 | End: 2021-05-20 | Stop reason: SDUPTHER

## 2021-04-19 ENCOUNTER — OFFICE VISIT (OUTPATIENT)
Dept: NEUROLOGY | Facility: MEDICAL CENTER | Age: 56
End: 2021-04-19
Attending: PSYCHIATRY & NEUROLOGY
Payer: COMMERCIAL

## 2021-04-19 VITALS
TEMPERATURE: 98.2 F | DIASTOLIC BLOOD PRESSURE: 66 MMHG | SYSTOLIC BLOOD PRESSURE: 110 MMHG | HEIGHT: 64 IN | HEART RATE: 91 BPM | BODY MASS INDEX: 32.74 KG/M2 | OXYGEN SATURATION: 97 % | WEIGHT: 191.8 LBS

## 2021-04-19 DIAGNOSIS — F32.9 REACTIVE DEPRESSION: ICD-10-CM

## 2021-04-19 DIAGNOSIS — G35 MULTIPLE SCLEROSIS (HCC): ICD-10-CM

## 2021-04-19 PROCEDURE — 99215 OFFICE O/P EST HI 40 MIN: CPT | Performed by: PSYCHIATRY & NEUROLOGY

## 2021-04-19 PROCEDURE — 99212 OFFICE O/P EST SF 10 MIN: CPT | Performed by: PSYCHIATRY & NEUROLOGY

## 2021-04-19 ASSESSMENT — FIBROSIS 4 INDEX: FIB4 SCORE: 0.87

## 2021-04-19 NOTE — ASSESSMENT & PLAN NOTE
Pt is feeling like she cannot tolerate her job because of a significant MS exacerbation of cognitive difficulty.  Is having difficulty with processing speed and concentration.  Patient is worried about making a significant mistake at her job which could be detrimental to someone as she is a  in [place].  Patient is not sleeping because this is causing so much stress.  Patient took the week off last week and still feels as if she cannot think normally.  Patient is due for immunotherapy and sometimes notices prior to her next immunotherapy dosage she experiences worsening and exacerbation of her symptoms.  Pt is having muscle spasm in her back. Pt feels like her left leg is in a izzy horse. Pt feels like she is very stressed and she is waking up with nightmares. Pt has had a lot of brain fog. She went to cardiology and palpitations were thought to be related to her multiple sclerosis.

## 2021-04-20 NOTE — PROGRESS NOTES
Chief Complaint   Patient presents with   • Follow-Up     MS       Problem List Items Addressed This Visit     Multiple sclerosis (HCC)     Pt is feeling like she cannot tolerate her job because of a significant MS exacerbation of cognitive difficulty.  Is having difficulty with processing speed and concentration.  Patient is worried about making a significant mistake at her job which could be detrimental to someone as she is a  in [place].  Patient is not sleeping because this is causing so much stress.  Patient took the week off last week and still feels as if she cannot think normally.  Patient is due for immunotherapy and sometimes notices prior to her next immunotherapy dosage she experiences worsening and exacerbation of her symptoms.  Pt is having muscle spasm in her back. Pt feels like her left leg is in a izzy horse. Pt feels like she is very stressed and she is waking up with nightmares. Pt has had a lot of brain fog. She went to cardiology and palpitations were thought to be related to her multiple sclerosis.         Relevant Orders    CBC WITH DIFFERENTIAL    COMP METABOLIC PANEL    IMMUNOGLOBULINS A/G/M SERUM          History of present illness:  Heaven Chavis 54 y.o. female presents today for multiple sclerosis, reactive depression and new complaints of difficult work worsening of her cognition consistent with MS relapse.  Has not been able to sleep because of the discomfort and neurologic strain.    Past medical history:   Past Medical History:   Diagnosis Date   • Hashimoto's disease 2004   • MS (multiple sclerosis) ([facility]) 2007       Past surgical history:   Past Surgical History:   Procedure Laterality Date   • ABDOMINAL HYSTERECTOMY TOTAL     • CHOLECYSTECTOMY     • FOOT SURGERY      left foot reattached       Family history:   Family History   Problem Relation Age of Onset   • Stroke Mother    • Clotting Disorder Father    • Thyroid Sister    • Thyroid Sister    • Diabetes Sister     • Thyroid Sister    • Thyroid Sister    • Thyroid Sister    • Thyroid Sister        Social history:   Social History     Socioeconomic History   • Marital status:      Spouse name: Not on file   • Number of children: Not on file   • Years of education: Not on file   • Highest education level: Not on file   Occupational History   • Not on file   Tobacco Use   • Smoking status: Former Smoker     Types: Cigarettes     Quit date: 2000     Years since quittin.3   • Smokeless tobacco: Never Used   Substance and Sexual Activity   • Alcohol use: Not Currently   • Drug use: No   • Sexual activity: Yes   Other Topics Concern   • Not on file   Social History Narrative   • Not on file     Social Determinants of Health     Financial Resource Strain:    • Difficulty of Paying Living Expenses:    Food Insecurity:    • Worried About Running Out of Food in the Last Year:    • Ran Out of Food in the Last Year:    Transportation Needs:    • Lack of Transportation (Medical):    • Lack of Transportation (Non-Medical):    Physical Activity:    • Days of Exercise per Week:    • Minutes of Exercise per Session:    Stress:    • Feeling of Stress :    Social Connections:    • Frequency of Communication with Friends and Family:    • Frequency of Social Gatherings with Friends and Family:    • Attends Jain Services:    • Active Member of Clubs or Organizations:    • Attends Club or Organization Meetings:    • Marital Status:    Intimate Partner Violence:    • Fear of Current or Ex-Partner:    • Emotionally Abused:    • Physically Abused:    • Sexually Abused:        Current medications:   Current Outpatient Medications   Medication   • naltrexone (DEPADE) 50 MG Tab   • FLUoxetine (PROZAC) 10 MG Cap   • baclofen (LIORESAL) 10 MG Tab   • nortriptyline (PAMELOR) 25 MG Cap   • Multiple Vitamins-Minerals (MULTIVITAMIN ADULT PO)   • Iron Carbonyl-Vitamin C-FOS (CHEWABLE IRON PO)   • modafinil (PROVIGIL) 200 MG Tab   • INTRAROSA  6.5 MG INSERT   • temazepam (RESTORIL) 30 MG capsule   • B Complex Vitamins (VITAMIN B COMPLEX PO)   • Ascorbic Acid (VITAMIN C) 1000 MG Tab   • Omega 3-6-9 Fatty Acids (OMEGA 3-6-9 COMPLEX PO)   • vitamin D (CHOLECALCIFEROL) 1000 UNIT Tab   • SYNTHROID 150 MCG Tab   • liothyronine (CYTOMEL) 5 MCG Tab     No current facility-administered medications for this visit.       Medication Allergy:  Allergies   Allergen Reactions   • Milk Products Food Allergy Hives   • Citrus Hives and Itching       Review of systems:   Constitutional: denies fever, night sweats, weight loss.   Eyes: denies acute vision change, eye pain or secretion.   Ears, Nose, Mouth, Throat: denies nasal secretion, nasal bleeding, difficulty swallowing, hearing loss, tinnitus, vertigo, ear pain, acute dental problems, oral ulcers or lesions.   Endocrine: denies recent weight changes, heat or cold intolerance, polyuria, polydypsia, polyphagia,abnormal hair growth.  Cardiovascular: denies new onset of chest pain, palpitations, syncope, or dyspnea of exertion.  Pulmonary: denies shortness of breath, new onset of cough, hemoptysis, wheezing, chest pain or flu-like symptoms.   GI: denies nausea, vomiting, diarrhea, GI bleeding, change in appetite, abdominal pain, and change in bowel habits.  : denies dysuria, urinary incontinence, hematuria.  Heme/oncology: denies history of easy bruising or bleeding. No history of cancer, DVTor PE.  Allergy/immunology: denies hives/urticaria, or itching.   Dermatologic: denies new rash, or new skin lesions.  Musculoskeletal:denies joint swelling or pain, muscle pain, neck and back pain. Neurologic: denies headaches, acute visual changes, facial droopiness, muscle weakness (focal or generalized), paresthesias, anesthesia, ataxia, change in speech or language, memory loss, abnormal movements, seizures, loss of consciousness, or episodes of confusion.   Psychiatric: denies symptoms of depression, anxiety, hallucinations,  "mood swings or changes, suicidal or homicidal thoughts.     Physical examination:   Vitals:    04/19/21 1023   BP: 110/66   BP Location: Left arm   Patient Position: Sitting   BP Cuff Size: Adult   Pulse: 91   Temp: 36.8 °C (98.2 °F)   TempSrc: Temporal   SpO2: 97%   Weight: 87 kg (191 lb 12.8 oz)   Height: 1.626 m (5' 4\")     General: Patient in no acute distress, pleasant and cooperative.  HEENT: Normocephalic, no signs of acute trauma.   Neck: supple, no meningeal signs or carotid bruits. There is normal range of motion. No tenderness on exam.   Chest: clear to auscultation. No cough.   CV: RRR, no murmurs.   Skin: no signs of acute rashes or trauma.   Musculoskeletal: joints exhibit full range of motion, without any pain to palpation. There are no signs of joint or muscle swelling. There is no tenderness to deep palpation of muscles.   Psychiatric: No hallucinatory behavior. Denies symptoms of depression or suicidal ideation. Mood and affect appear normal on exam.     NEUROLOGICAL EXAM:   Mental status, orientation: Awake, alert and fully oriented.   Speech and language: speech is clear and fluent. The patient is able to name, repeat and comprehend.   Memory: There is intact recollection of recent and remote events.   Cranial nerve exam: Pupils are 3-4 mm bilaterally and equally reactive to light and accommodation. Visual fields are intact by confrontation. Fundoscopic exam was unremarkable. There is no nystagmus on primary or secondary gaze. Intact full EOM in all directions of gaze. Face appears symmetric. Sensation in the face is intact to light touch. Uvula is midline. Palate elevates symmetrically. Tongue is midline and without any signs of tongue biting or fasciculations. Sternocleidomastoid muscles exhibit is normal strength bilaterally. Shoulder shrug is intact bilaterally.   Motor exam: Strength is 5/5 in all extremities. Tone is normal. No abnormal movements were seen on exam.   Sensory exam reveals " normal sense of light touch, proprioception, vibration and pinprick in all extremities.   Deep tendon reflexes:  2+ throughout. Plantar responses are flexor. There is no clonus.   Coordination: shows a normal finger-nose-finger. Normal rapidly alternating movements.   Gait: The patient was able to get up from seated position on first attempt without requiring assistance. Found to be steady when walking. Movements were fluid with normal arm swing. The patient was able to turn without difficulties or tendency to fall. Romberg examination       ANCILLARY DATA REVIEWED:     Lab Data Review:  No results found for this or any previous visit (from the past 24 hour(s)).    Records reviewed: I reviewed previous H&H and all of her primary care visits and anemia was not assessed.      Imaging: I reviewed patient's last MRI scan of the brain which were done in the PACS system in September 2020.          ASSESSMENT AND PLAN:    1. Multiple sclerosis (HCC)  Patient is currently in an MS exacerbation and is having cognitive symptoms in addition to muscle spasms as her primary issues.  Plan for patient to be off work during immunotherapy and recovery time for the next 6 weeks to allow for optimal neurologic recovery.  I filled out [facility] paperwork explaining as such.  Patient is in agreement with this plan.  Laboratory testing to be done prior to next infusion.  Plan to continue Ocrevus care for infusion next month.  Patient states that she usually does well with her infusions.  She has no recent infections.  Patient does state that she has had worsening of her gait.  Patient states that she needs to continue to work on her healthy diet.  Patient will get CBC, CMP and immunoglobulins prior to her next Ocrevus infusion which I have ordered today.        2.  Reactive depression    Restart low-dose Prozac 10 mg which is helped in dealing with the grief of the loss of her .          FOLLOW-UP:   3 months     I spent 45 minutes  with this patient face-to-face, over fifty percent was spent counseling patient on their condition, best management practices, reviewing test results and risks and benefits of treatment.          EDUCATION AND COUNSELING:  -Discussed regular exercise program and prevention of cardiovascular disease, including stroke.   -Discussed healthy lifestyle, including: healthy diet (rich in fruits, vegetables, nuts and healthy oils); proper hydration, and adequate sleep hygiene (allowing 7-8 hrs of overnight sleep).        Melissa Bloch, MD  Clinical  of Neurology [facility].   Diplomate in Neurology.   Office: [phone]  Fax: [phone]

## 2021-04-20 NOTE — ASSESSMENT & PLAN NOTE
She had stopped the Prozac but she is not sure why.  She does think it may have been helping her in the past.

## 2021-05-13 DIAGNOSIS — G35 MULTIPLE SCLEROSIS (HCC): ICD-10-CM

## 2021-05-13 RX ORDER — TEMAZEPAM 30 MG/1
15 CAPSULE ORAL NIGHTLY PRN
Qty: 30 CAPSULE | Refills: 1 | Status: SHIPPED | OUTPATIENT
Start: 2021-05-13 | End: 2021-05-20 | Stop reason: SDUPTHER

## 2021-05-13 NOTE — TELEPHONE ENCOUNTER
Received request via: Pharmacy    Was the patient seen in the last year in this department? Yes    Does the patient have an active prescription (recently filled or refills available) for medication(s) requested? Yes.    Pharmacy sent request for script refill  For breakthrough insomnia

## 2021-05-20 ENCOUNTER — TELEMEDICINE (OUTPATIENT)
Dept: NEUROLOGY | Facility: MEDICAL CENTER | Age: 56
End: 2021-05-20
Attending: PSYCHIATRY & NEUROLOGY
Payer: COMMERCIAL

## 2021-05-20 VITALS — WEIGHT: 191 LBS | BODY MASS INDEX: 32.61 KG/M2 | HEIGHT: 64 IN

## 2021-05-20 DIAGNOSIS — G35 MULTIPLE SCLEROSIS (HCC): ICD-10-CM

## 2021-05-20 DIAGNOSIS — R53.82 CHRONIC FATIGUE: ICD-10-CM

## 2021-05-20 PROCEDURE — 99214 OFFICE O/P EST MOD 30 MIN: CPT | Mod: 95 | Performed by: PSYCHIATRY & NEUROLOGY

## 2021-05-20 RX ORDER — DIPHENHYDRAMINE HCL 25 MG
25 TABLET ORAL ONCE
Status: CANCELLED | OUTPATIENT
Start: 2021-05-20 | End: 2021-05-20

## 2021-05-20 RX ORDER — SODIUM CHLORIDE 9 MG/ML
INJECTION, SOLUTION INTRAVENOUS CONTINUOUS
Status: CANCELLED | OUTPATIENT
Start: 2021-05-20

## 2021-05-20 RX ORDER — METHYLPREDNISOLONE SODIUM SUCCINATE 125 MG/2ML
125 INJECTION, POWDER, LYOPHILIZED, FOR SOLUTION INTRAMUSCULAR; INTRAVENOUS PRN
Status: CANCELLED | OUTPATIENT
Start: 2021-05-20

## 2021-05-20 RX ORDER — DIPHENHYDRAMINE HYDROCHLORIDE 50 MG/ML
50 INJECTION INTRAMUSCULAR; INTRAVENOUS PRN
Status: CANCELLED | OUTPATIENT
Start: 2021-05-20

## 2021-05-20 RX ORDER — BACLOFEN 10 MG/1
10 TABLET ORAL 3 TIMES DAILY
Qty: 270 TABLET | Refills: 3 | Status: SHIPPED | OUTPATIENT
Start: 2021-05-20 | End: 2022-05-16 | Stop reason: SDUPTHER

## 2021-05-20 RX ORDER — METHYLPREDNISOLONE SODIUM SUCCINATE 125 MG/2ML
100 INJECTION, POWDER, LYOPHILIZED, FOR SOLUTION INTRAMUSCULAR; INTRAVENOUS ONCE
Status: CANCELLED | OUTPATIENT
Start: 2021-05-20

## 2021-05-20 RX ORDER — NORTRIPTYLINE HYDROCHLORIDE 25 MG/1
CAPSULE ORAL
Qty: 180 CAPSULE | Refills: 3 | Status: SHIPPED | OUTPATIENT
Start: 2021-05-20 | End: 2022-05-16 | Stop reason: SDUPTHER

## 2021-05-20 RX ORDER — 0.9 % SODIUM CHLORIDE 0.9 %
10 VIAL (ML) INJECTION PRN
Status: CANCELLED | OUTPATIENT
Start: 2021-05-20

## 2021-05-20 RX ORDER — MODAFINIL 200 MG/1
200 TABLET ORAL DAILY
Qty: 90 TABLET | Refills: 1 | Status: SHIPPED | OUTPATIENT
Start: 2021-05-20 | End: 2021-08-18

## 2021-05-20 RX ORDER — TEMAZEPAM 30 MG/1
30 CAPSULE ORAL NIGHTLY PRN
Qty: 90 CAPSULE | Refills: 1 | Status: SHIPPED | OUTPATIENT
Start: 2021-05-20 | End: 2021-06-29 | Stop reason: SDUPTHER

## 2021-05-20 RX ORDER — EPINEPHRINE 1 MG/ML(1)
0.5 AMPUL (ML) INJECTION PRN
Status: CANCELLED | OUTPATIENT
Start: 2021-05-20

## 2021-05-20 RX ORDER — 0.9 % SODIUM CHLORIDE 0.9 %
VIAL (ML) INJECTION PRN
Status: CANCELLED | OUTPATIENT
Start: 2021-05-20

## 2021-05-20 RX ORDER — 0.9 % SODIUM CHLORIDE 0.9 %
3 VIAL (ML) INJECTION PRN
Status: CANCELLED | OUTPATIENT
Start: 2021-05-20

## 2021-05-20 RX ORDER — ACETAMINOPHEN 325 MG/1
650 TABLET ORAL ONCE
Status: CANCELLED | OUTPATIENT
Start: 2021-05-20

## 2021-05-20 RX ORDER — FLUOXETINE 10 MG/1
10 CAPSULE ORAL DAILY
Qty: 90 CAPSULE | Refills: 3 | Status: SHIPPED | OUTPATIENT
Start: 2021-05-20 | End: 2021-11-07 | Stop reason: SDUPTHER

## 2021-05-20 RX ORDER — HEPARIN SODIUM (PORCINE) LOCK FLUSH IV SOLN 100 UNIT/ML 100 UNIT/ML
500 SOLUTION INTRAVENOUS PRN
Status: CANCELLED | OUTPATIENT
Start: 2021-05-20

## 2021-05-20 RX ORDER — ONDANSETRON 4 MG/1
4 TABLET, ORALLY DISINTEGRATING ORAL EVERY 4 HOURS PRN
Status: CANCELLED | OUTPATIENT
Start: 2021-05-20

## 2021-05-20 ASSESSMENT — FIBROSIS 4 INDEX: FIB4 SCORE: 0.87

## 2021-05-20 NOTE — ASSESSMENT & PLAN NOTE
Pt feels stiff a lot but baclofen works together with baclofen. Pt does yoga and uses a fibroplate. Her right foot feels puffy and like she is walking on something.

## 2021-05-21 NOTE — PROGRESS NOTES
Virtual Visit: Established Patient   This visit was conducted via Zoom using secure and encrypted videoconferencing technology. The patient was in a private location in the state of [place].    The patient's identity was confirmed and verbal consent was obtained for this virtual visit.    Subjective:   CC:   Chief Complaint   Patient presents with   • Follow-Up     MS       Heaven Chavis is a 55 y.o. female presenting for evaluation and management of: Multiple sclerosis.    Heaven Ag has been on leave from her work as a [employer] because of undue stress and decreased flexibility in her working environment.  Patient is starting to feel better wellbeing at home.  However she continues to have issues with fatigue and heat intolerance.  Patient is just being able to get to do things at home without taking so many medications.  And being completely worn out after her shifts.  Patient is due to get her Ocrevus infusion at the end of this month but needs to schedule it.  Patient has insurance and is considering [employer] but would like to look for a new job at some point.    ROS fatigue, palpitations, depression  Denies any recent fevers or chills. No nausea or vomiting. No chest pains or shortness of breath.     Allergies   Allergen Reactions   • Milk Products Food Allergy Hives   • Citrus Hives and Itching       Current medicines (including changes today)  Current Outpatient Medications   Medication Sig Dispense Refill   • FLUoxetine (PROZAC) 10 MG Cap Take 1 capsule by mouth every day. 90 capsule 3   • baclofen (LIORESAL) 10 MG Tab Take 1 tablet by mouth 3 times a day. 270 tablet 3   • nortriptyline (PAMELOR) 25 MG Cap TAKE 1-2 CAPSULES BY MOUTH EVERY EVENING 180 capsule 3   • temazepam (RESTORIL) 30 MG capsule Take 1 capsule by mouth at bedtime as needed for Sleep for up to 90 days. Takes 30 mg at bedtime 90 capsule 1   • modafinil (PROVIGIL) 200 MG Tab Take 1 tablet by mouth every day for 90 days. 90 tablet 1  "  • naltrexone (DEPADE) 50 MG Tab Take 1 tablet by mouth every day. 30 tablet 6   • Multiple Vitamins-Minerals (MULTIVITAMIN ADULT PO) Take  by mouth.     • Iron Carbonyl-Vitamin C-FOS (CHEWABLE IRON PO) Take  by mouth.     • INTRAROSA 6.5 MG INSERT INSERT 1 TABLET VAGINALLY NIGHTLY AT BEDTIME  3   • B Complex Vitamins (VITAMIN B COMPLEX PO) Take  by mouth.     • Ascorbic Acid (VITAMIN C) 1000 MG Tab Take  by mouth.     • Omega 3-6-9 Fatty Acids (OMEGA 3-6-9 COMPLEX PO) Take  by mouth.     • vitamin D (CHOLECALCIFEROL) 1000 UNIT Tab Take 1,000 Units by mouth every day. 1,000 to 3,000 IUs a day     • SYNTHROID 150 MCG Tab Take 150 mcg by mouth every day.  11   • liothyronine (CYTOMEL) 5 MCG Tab TAKE 2 TABLETS BY MOUTH EVERY MORNING & TAKE 1 TABLET IN THE EVENING  11     No current facility-administered medications for this visit.       Patient Active Problem List    Diagnosis Date Noted   • Palpitations 01/06/2021   • Reactive depression 05/20/2020   • Iron deficiency anemia 05/19/2020   • Multiple sclerosis (HCC) 03/12/2019   • Postoperative hypothyroidism 03/12/2019   • Eczema 03/12/2019       Family History   Problem Relation Age of Onset   • Stroke Mother    • Clotting Disorder Father    • Thyroid Sister    • Thyroid Sister    • Diabetes Sister    • Thyroid Sister    • Thyroid Sister    • Thyroid Sister    • Thyroid Sister        She  has a past medical history of Hashimoto's disease (2004) and MS (multiple sclerosis) (HCC) (2007).  She  has a past surgical history that includes foot surgery; cholecystectomy; and abdominal hysterectomy total.       Objective:   Ht 1.626 m (5' 4\")   Wt 86.6 kg (191 lb)   BMI 32.79 kg/m²     Physical Exam:  Constitutional: Alert, no distress, well-groomed.  Skin: No rashes in visible areas.  Eye: Round. Conjunctiva clear, lids normal. No icterus.   ENMT: Lips pink without lesions, good dentition, moist mucous membranes. Phonation normal.  Neck: No masses, no thyromegaly. Moves " freely without pain.  Respiratory: Unlabored respiratory effort, no cough or audible wheeze  Psych: Alert and oriented x3, normal affect and mood.       Assessment and Plan:   The following treatment plan was discussed:     1. Multiple sclerosis (HCC)  - FLUoxetine (PROZAC) 10 MG Cap; Take 1 capsule by mouth every day.  Dispense: 90 capsule; Refill: 3  - baclofen (LIORESAL) 10 MG Tab; Take 1 tablet by mouth 3 times a day.  Dispense: 270 tablet; Refill: 3  - nortriptyline (PAMELOR) 25 MG Cap; TAKE 1-2 CAPSULES BY MOUTH EVERY EVENING  Dispense: 180 capsule; Refill: 3  - temazepam (RESTORIL) 30 MG capsule; Take 1 capsule by mouth at bedtime as needed for Sleep for up to 90 days. Takes 30 mg at bedtime  Dispense: 90 capsule; Refill: 1  - modafinil (PROVIGIL) 200 MG Tab; Take 1 tablet by mouth every day for 90 days.  Dispense: 90 tablet; Refill: 1    2. Chronic fatigue  - FLUoxetine (PROZAC) 10 MG Cap; Take 1 capsule by mouth every day.  Dispense: 90 capsule; Refill: 3  - modafinil (PROVIGIL) 200 MG Tab; Take 1 tablet by mouth every day for 90 days.  Dispense: 90 tablet; Refill: 1    Other orders  - NS infusion  - methylPREDNISolone sod succ (SOLU-MEDROL) 125 MG injection 100 mg  - acetaminophen (Tylenol) tablet 650 mg  - diphenhydrAMINE (BENADRYL) tablet/capsule 25 mg  - ocrelizumab (OCREVUS) 600 mg in  mL IVPB  - EPINEPHrine injection 0.5 mg  - diphenhydrAMINE (BENADRYL) injection 50 mg  - methylPREDNISolone sod succ (SOLU-MEDROL) 125 MG injection 125 mg  - ondansetron (ZOFRAN ODT) dispertab 4 mg  - normal saline PF 0.9 % 3 mL  - normal saline PF 0.9 % 10 mL  - normal saline PF 0.9 % 10 mL  - normal saline PF 0.9 %  - heparin lock flush 100 unit/mL injection 500 Units    Patient instructed to call the infusion center to schedule her infusion ASAP.  Patient is already had her laboratory testing and it is okay to go ahead with the infusion.  I refilled all her other medications for her 3-month supply.  We discussed  the importance of diet and exercise and a cooling vest.  Patient was referred to the MS nurse Navigator program for help with obstacles related to her multiple sclerosis    Follow-up: After Ocrevus infusion     I spent 34 minutes with this patient, over fifty percent was spent counseling patient on their condition, best management practices, reviewing test results and risks and benefits of treatment.

## 2021-05-23 ENCOUNTER — OUTPATIENT INFUSION SERVICES (OUTPATIENT)
Dept: ONCOLOGY | Facility: MEDICAL CENTER | Age: 56
End: 2021-05-23
Attending: PSYCHIATRY & NEUROLOGY
Payer: COMMERCIAL

## 2021-05-23 VITALS
TEMPERATURE: 97.6 F | HEART RATE: 92 BPM | HEIGHT: 64 IN | DIASTOLIC BLOOD PRESSURE: 68 MMHG | WEIGHT: 194 LBS | SYSTOLIC BLOOD PRESSURE: 119 MMHG | OXYGEN SATURATION: 99 % | BODY MASS INDEX: 33.12 KG/M2 | RESPIRATION RATE: 18 BRPM

## 2021-05-23 DIAGNOSIS — G35 MULTIPLE SCLEROSIS (HCC): ICD-10-CM

## 2021-05-23 PROCEDURE — 700105 HCHG RX REV CODE 258: Performed by: PSYCHIATRY & NEUROLOGY

## 2021-05-23 PROCEDURE — 96375 TX/PRO/DX INJ NEW DRUG ADDON: CPT

## 2021-05-23 PROCEDURE — 700111 HCHG RX REV CODE 636 W/ 250 OVERRIDE (IP): Performed by: PSYCHIATRY & NEUROLOGY

## 2021-05-23 PROCEDURE — A9270 NON-COVERED ITEM OR SERVICE: HCPCS | Performed by: PSYCHIATRY & NEUROLOGY

## 2021-05-23 PROCEDURE — 306780 HCHG STAT FOR TRANSFUSION PER CASE

## 2021-05-23 PROCEDURE — 96415 CHEMO IV INFUSION ADDL HR: CPT

## 2021-05-23 PROCEDURE — 700102 HCHG RX REV CODE 250 W/ 637 OVERRIDE(OP): Performed by: PSYCHIATRY & NEUROLOGY

## 2021-05-23 PROCEDURE — 96365 THER/PROPH/DIAG IV INF INIT: CPT

## 2021-05-23 PROCEDURE — 96366 THER/PROPH/DIAG IV INF ADDON: CPT

## 2021-05-23 PROCEDURE — 96413 CHEMO IV INFUSION 1 HR: CPT

## 2021-05-23 RX ORDER — ONDANSETRON 4 MG/1
4 TABLET, ORALLY DISINTEGRATING ORAL EVERY 4 HOURS PRN
Status: CANCELLED | OUTPATIENT
Start: 2021-11-19

## 2021-05-23 RX ORDER — ACETAMINOPHEN 325 MG/1
650 TABLET ORAL ONCE
Status: COMPLETED | OUTPATIENT
Start: 2021-05-23 | End: 2021-05-23

## 2021-05-23 RX ORDER — HEPARIN SODIUM (PORCINE) LOCK FLUSH IV SOLN 100 UNIT/ML 100 UNIT/ML
500 SOLUTION INTRAVENOUS PRN
Status: CANCELLED | OUTPATIENT
Start: 2021-11-19

## 2021-05-23 RX ORDER — 0.9 % SODIUM CHLORIDE 0.9 %
10 VIAL (ML) INJECTION PRN
Status: CANCELLED | OUTPATIENT
Start: 2021-11-19

## 2021-05-23 RX ORDER — DIPHENHYDRAMINE HCL 25 MG
25 TABLET ORAL ONCE
Status: COMPLETED | OUTPATIENT
Start: 2021-05-23 | End: 2021-05-23

## 2021-05-23 RX ORDER — METHYLPREDNISOLONE SODIUM SUCCINATE 125 MG/2ML
100 INJECTION, POWDER, LYOPHILIZED, FOR SOLUTION INTRAMUSCULAR; INTRAVENOUS ONCE
Status: COMPLETED | OUTPATIENT
Start: 2021-05-23 | End: 2021-05-23

## 2021-05-23 RX ORDER — METHYLPREDNISOLONE SODIUM SUCCINATE 125 MG/2ML
125 INJECTION, POWDER, LYOPHILIZED, FOR SOLUTION INTRAMUSCULAR; INTRAVENOUS PRN
Status: CANCELLED | OUTPATIENT
Start: 2021-11-19

## 2021-05-23 RX ORDER — 0.9 % SODIUM CHLORIDE 0.9 %
3 VIAL (ML) INJECTION PRN
Status: CANCELLED | OUTPATIENT
Start: 2021-11-19

## 2021-05-23 RX ORDER — DIPHENHYDRAMINE HYDROCHLORIDE 50 MG/ML
50 INJECTION INTRAMUSCULAR; INTRAVENOUS PRN
Status: CANCELLED | OUTPATIENT
Start: 2021-11-19

## 2021-05-23 RX ORDER — DIPHENHYDRAMINE HCL 25 MG
25 TABLET ORAL ONCE
Status: CANCELLED | OUTPATIENT
Start: 2021-11-19 | End: 2021-11-19

## 2021-05-23 RX ORDER — SODIUM CHLORIDE 9 MG/ML
INJECTION, SOLUTION INTRAVENOUS CONTINUOUS
Status: DISCONTINUED | OUTPATIENT
Start: 2021-05-23 | End: 2021-05-23 | Stop reason: HOSPADM

## 2021-05-23 RX ORDER — SODIUM CHLORIDE 9 MG/ML
INJECTION, SOLUTION INTRAVENOUS CONTINUOUS
Status: CANCELLED | OUTPATIENT
Start: 2021-11-19

## 2021-05-23 RX ORDER — 0.9 % SODIUM CHLORIDE 0.9 %
VIAL (ML) INJECTION PRN
Status: CANCELLED | OUTPATIENT
Start: 2021-11-19

## 2021-05-23 RX ORDER — METHYLPREDNISOLONE SODIUM SUCCINATE 125 MG/2ML
100 INJECTION, POWDER, LYOPHILIZED, FOR SOLUTION INTRAMUSCULAR; INTRAVENOUS ONCE
Status: CANCELLED | OUTPATIENT
Start: 2021-11-19

## 2021-05-23 RX ORDER — EPINEPHRINE 1 MG/ML(1)
0.5 AMPUL (ML) INJECTION PRN
Status: CANCELLED | OUTPATIENT
Start: 2021-11-19

## 2021-05-23 RX ORDER — ACETAMINOPHEN 325 MG/1
650 TABLET ORAL ONCE
Status: CANCELLED | OUTPATIENT
Start: 2021-11-19

## 2021-05-23 RX ADMIN — METHYLPREDNISOLONE SODIUM SUCCINATE 100 MG: 125 INJECTION, POWDER, FOR SOLUTION INTRAMUSCULAR; INTRAVENOUS at 10:02

## 2021-05-23 RX ADMIN — DIPHENHYDRAMINE HYDROCHLORIDE 25 MG: 25 TABLET ORAL at 10:02

## 2021-05-23 RX ADMIN — ACETAMINOPHEN 650 MG: 325 TABLET ORAL at 10:02

## 2021-05-23 RX ADMIN — DIPHENHYDRAMINE HYDROCHLORIDE 50 MG: 50 INJECTION, SOLUTION INTRAMUSCULAR; INTRAVENOUS at 11:04

## 2021-05-23 RX ADMIN — OCRELIZUMAB 600 MG: 300 INJECTION INTRAVENOUS at 10:25

## 2021-05-23 ASSESSMENT — FIBROSIS 4 INDEX: FIB4 SCORE: 0.87

## 2021-05-23 NOTE — PROGRESS NOTES
Patient presents for Ocrevus infusion. PIV established, flushes well with brisk blood return. Ocrevus titrated up per guidelines and patient tolerance. Ocrevus paused for itching, PRN Benadryl give, itching resolved and Ocrevus re-started. Ocrevus completed with no new patient complaints. Patient observed for one hour post infusion and remains stable. PIV removed tip intact, compression dressing to site. Email to schedulers for patient's next appointment and patient released in no acute distress.

## 2021-06-03 ENCOUNTER — OFFICE VISIT (OUTPATIENT)
Dept: NEUROLOGY | Facility: MEDICAL CENTER | Age: 56
End: 2021-06-03
Attending: PSYCHIATRY & NEUROLOGY
Payer: COMMERCIAL

## 2021-06-03 VITALS
SYSTOLIC BLOOD PRESSURE: 112 MMHG | DIASTOLIC BLOOD PRESSURE: 74 MMHG | RESPIRATION RATE: 16 BRPM | TEMPERATURE: 98.4 F | HEIGHT: 64 IN | WEIGHT: 194 LBS | BODY MASS INDEX: 33.12 KG/M2 | HEART RATE: 100 BPM | OXYGEN SATURATION: 98 %

## 2021-06-03 DIAGNOSIS — G35 MULTIPLE SCLEROSIS (HCC): ICD-10-CM

## 2021-06-03 PROCEDURE — 99202 OFFICE O/P NEW SF 15 MIN: CPT | Performed by: PSYCHIATRY & NEUROLOGY

## 2021-06-03 PROCEDURE — 99214 OFFICE O/P EST MOD 30 MIN: CPT | Performed by: PSYCHIATRY & NEUROLOGY

## 2021-06-03 ASSESSMENT — FIBROSIS 4 INDEX: FIB4 SCORE: 0.87

## 2021-06-03 NOTE — LETTER
Alcira 3, 2021        Heaven Chavis      This is a prescription for a cooling vest for this patient with Multiple Sclerosis and heat intolerance causing worsening of her MS symptoms.         Sincerely,      Melissa P Bloch, M.D.    Clinical Professor of Neurology

## 2021-06-04 NOTE — ASSESSMENT & PLAN NOTE
Pt states she had weakness in her left arm and side of her left ribs post her last Ocrevus infusion done on 23 May.  She felt she had more reaction to the faster infusion and the oral medications instead of the IV medications.  Patient did feel significant anxiety and has had trouble sleeping since this infusion reaction.  It is getting slowly better on its own with patient symptomatic treatment.  Pt did heating pad and then ice over 3 days helped relieve the symptoms.  Patient is feeling overall less stressed since leaving her job as a  which was causing her significant worsening of her cognitive and MS fatigue symptoms.

## 2021-06-04 NOTE — PROGRESS NOTES
Chief Complaint   Patient presents with   • Follow-Up     Multiple sclerosis        Problem List Items Addressed This Visit     Multiple sclerosis (HCC)     Pt states she had weakness in her left arm and side of her left ribs post her last Ocrevus infusion done on 23 May.  She felt she had more reaction to the faster infusion and the oral medications instead of the IV medications.  Patient did feel significant anxiety and has had trouble sleeping since this infusion reaction.  It is getting slowly better on its own with patient symptomatic treatment.  Pt did heating pad and then ice over 3 days helped relieve the symptoms.  Patient is feeling overall less stressed since leaving her job as a  which was causing her significant worsening of her cognitive and MS fatigue symptoms.               History of present illness:  Heaven Chavis 54 y.o. female presents today for multiple sclerosis and recent prolonged infusion reaction.  She has not been able to sleep well recently because of the discomfort and neurologic strain.    Past medical history:   Past Medical History:   Diagnosis Date   • Hashimoto's disease 2004   • MS (multiple sclerosis) (Formerly [facility]) 2007       Past surgical history:   Past Surgical History:   Procedure Laterality Date   • ABDOMINAL HYSTERECTOMY TOTAL     • CHOLECYSTECTOMY     • FOOT SURGERY      left foot reattached       Family history:   Family History   Problem Relation Age of Onset   • Stroke Mother    • Clotting Disorder Father    • Thyroid Sister    • Thyroid Sister    • Diabetes Sister    • Thyroid Sister    • Thyroid Sister    • Thyroid Sister    • Thyroid Sister        Social history:   Social History     Socioeconomic History   • Marital status:      Spouse name: Not on file   • Number of children: Not on file   • Years of education: Not on file   • Highest education level: Not on file   Occupational History   • Not on file   Tobacco Use   • Smoking status: Former Smoker      Types: Cigarettes     Quit date:      Years since quittin.4   • Smokeless tobacco: Never Used   Substance and Sexual Activity   • Alcohol use: Not Currently   • Drug use: No   • Sexual activity: Yes   Other Topics Concern   • Not on file   Social History Narrative   • Not on file     Social Determinants of Health     Financial Resource Strain:    • Difficulty of Paying Living Expenses:    Food Insecurity:    • Worried About Running Out of Food in the Last Year:    • Ran Out of Food in the Last Year:    Transportation Needs:    • Lack of Transportation (Medical):    • Lack of Transportation (Non-Medical):    Physical Activity:    • Days of Exercise per Week:    • Minutes of Exercise per Session:    Stress:    • Feeling of Stress :    Social Connections:    • Frequency of Communication with Friends and Family:    • Frequency of Social Gatherings with Friends and Family:    • Attends Sikhism Services:    • Active Member of Clubs or Organizations:    • Attends Club or Organization Meetings:    • Marital Status:    Intimate Partner Violence:    • Fear of Current or Ex-Partner:    • Emotionally Abused:    • Physically Abused:    • Sexually Abused:        Current medications:   Current Outpatient Medications   Medication   • FLUoxetine (PROZAC) 10 MG Cap   • baclofen (LIORESAL) 10 MG Tab   • nortriptyline (PAMELOR) 25 MG Cap   • temazepam (RESTORIL) 30 MG capsule   • modafinil (PROVIGIL) 200 MG Tab   • naltrexone (DEPADE) 50 MG Tab   • Multiple Vitamins-Minerals (MULTIVITAMIN ADULT PO)   • Iron Carbonyl-Vitamin C-FOS (CHEWABLE IRON PO)   • INTRAROSA 6.5 MG INSERT   • B Complex Vitamins (VITAMIN B COMPLEX PO)   • Ascorbic Acid (VITAMIN C) 1000 MG Tab   • Omega 3-6-9 Fatty Acids (OMEGA 3-6-9 COMPLEX PO)   • vitamin D (CHOLECALCIFEROL) 1000 UNIT Tab   • SYNTHROID 150 MCG Tab   • liothyronine (CYTOMEL) 5 MCG Tab     No current facility-administered medications for this visit.       Medication Allergy:  Allergies  "  Allergen Reactions   • Milk Products Food Allergy Hives   • Citrus Hives and Itching       Review of systems:   Constitutional: denies fever, night sweats, weight loss.   Eyes: denies acute vision change, eye pain or secretion.   Ears, Nose, Mouth, Throat: denies nasal secretion, nasal bleeding, difficulty swallowing, hearing loss, tinnitus, vertigo, ear pain, acute dental problems, oral ulcers or lesions.   Endocrine: denies recent weight changes, heat or cold intolerance, polyuria, polydypsia, polyphagia,abnormal hair growth.  Cardiovascular: denies new onset of chest pain, palpitations, syncope, or dyspnea of exertion.  Pulmonary: denies shortness of breath, new onset of cough, hemoptysis, wheezing, chest pain or flu-like symptoms.   GI: denies nausea, vomiting, diarrhea, GI bleeding, change in appetite, abdominal pain, and change in bowel habits.  : denies dysuria, urinary incontinence, hematuria.  Heme/oncology: denies history of easy bruising or bleeding. No history of cancer, DVTor PE.  Allergy/immunology: denies hives/urticaria, or itching.   Dermatologic: denies new rash, or new skin lesions.  Musculoskeletal:denies joint swelling or pain, muscle pain, neck and back pain. Neurologic: denies headaches, acute visual changes, facial droopiness, muscle weakness (focal or generalized), paresthesias, anesthesia, ataxia, change in speech or language, memory loss, abnormal movements, seizures, loss of consciousness, or episodes of confusion.   Psychiatric: denies symptoms of depression, anxiety, hallucinations, mood swings or changes, suicidal or homicidal thoughts.     Physical examination:   Vitals:    06/03/21 1642   BP: 112/74   BP Location: Right arm   Patient Position: Sitting   BP Cuff Size: Adult   Pulse: 100   Resp: 16   Temp: 36.9 °C (98.4 °F)   TempSrc: Temporal   SpO2: 98%   Weight: 88 kg (194 lb)   Height: 1.635 m (5' 4.37\")     General: Patient in no acute distress, pleasant and " cooperative.  HEENT: Normocephalic, no signs of acute trauma.   Neck: supple, no meningeal signs or carotid bruits. There is normal range of motion. No tenderness on exam.   Chest: clear to auscultation. No cough.   CV: RRR, no murmurs.   Skin: no signs of acute rashes or trauma.   Musculoskeletal: joints exhibit full range of motion, without any pain to palpation. There are no signs of joint or muscle swelling. There is no tenderness to deep palpation of muscles.   Psychiatric: No hallucinatory behavior. Denies symptoms of depression or suicidal ideation. Mood and affect appear normal on exam.     NEUROLOGICAL EXAM:   Mental status, orientation: Awake, alert and fully oriented.   Speech and language: speech is clear and fluent. The patient is able to name, repeat and comprehend.   Memory: There is intact recollection of recent and remote events.   Cranial nerve exam: Pupils are 3-4 mm bilaterally and equally reactive to light and accommodation. Visual fields are intact by confrontation. Fundoscopic exam was unremarkable. There is no nystagmus on primary or secondary gaze. Intact full EOM in all directions of gaze. Face appears symmetric. Sensation in the face is intact to light touch. Uvula is midline. Palate elevates symmetrically. Tongue is midline and without any signs of tongue biting or fasciculations. Sternocleidomastoid muscles exhibit is normal strength bilaterally. Shoulder shrug is intact bilaterally.   Motor exam: Strength is 5/5 in all extremities. Tone is normal. No abnormal movements were seen on exam.   Sensory exam reveals normal sense of light touch, proprioception, vibration and pinprick in all extremities.   Deep tendon reflexes:  2+ throughout. Plantar responses are flexor. There is no clonus.   Coordination: shows a normal finger-nose-finger. Normal rapidly alternating movements.   Gait: The patient was able to get up from seated position on first attempt without requiring assistance. Found to  be steady when walking. Movements were fluid with normal arm swing. The patient was able to turn without difficulties or tendency to fall. Romberg examination       ANCILLARY DATA REVIEWED:     Lab Data Review:  No results found for this or any previous visit (from the past 24 hour(s)).    Records reviewed: I reviewed previous H&H and all of her primary care visits and anemia was not assessed.      Imaging: I reviewed patient's last MRI scan of the brain which were done in the PACS system in September 2020.          ASSESSMENT AND PLAN:    1. Multiple sclerosis (HCC)  Patient is currently recovering from an infusion reaction from Ocrevus which caused an MS exacerbation primarily in her left arm, brachial plexus and rib cage.  Plan for patient to be off work during immunotherapy and recovery time for the next 6 months to allow for optimal neurologic recovery.   Patient is in agreement with this plan.  Laboratory testing to be done prior to bubble change from Ocrevus to Kesimpta because of infusion reaction.    She has no recent infections.  Patient does state that she has had worsening of her gait and sleep.  Patient states that she needs to continue to work on her healthy diet.  Patient will get CBC, CMP and immunoglobulins prior to her Kesimpta switch which I have ordered today.        2.  Reactive depression  Plan to continue low-dose Prozac and increase exercise.  Patient also also continue with Pamelor.          FOLLOW-UP:   3 months     I spent 35 minutes with this patient face-to-face, over fifty percent was spent counseling patient on their condition, best management practices, reviewing test results and risks and benefits of treatment.          EDUCATION AND COUNSELING:  -Discussed regular exercise program and prevention of cardiovascular disease, including stroke.   -Discussed healthy lifestyle, including: healthy diet (rich in fruits, vegetables, nuts and healthy oils); proper hydration, and adequate sleep  hygiene (allowing 7-8 hrs of overnight sleep).        Melissa Bloch, MD  Clinical  of Neurology [facility].   Diplomate in Neurology.   Office: [phone]  Fax: [phone]

## 2021-06-29 DIAGNOSIS — G35 MULTIPLE SCLEROSIS (HCC): ICD-10-CM

## 2021-06-29 RX ORDER — TEMAZEPAM 30 MG/1
30 CAPSULE ORAL NIGHTLY PRN
Qty: 90 CAPSULE | Refills: 1 | Status: SHIPPED | OUTPATIENT
Start: 2021-06-29 | End: 2021-09-27

## 2021-07-21 ENCOUNTER — TELEPHONE (OUTPATIENT)
Dept: NEUROLOGY | Facility: MEDICAL CENTER | Age: 56
End: 2021-07-21

## 2021-07-21 NOTE — TELEPHONE ENCOUNTER
[facility] called asking for a supervising physicians name, NPI and KAREN he stated that per law APRN's need to have a supervising physicians information needed to be on the he rx.    The rx that needed to be signed of was the tenazepam 30mg caps.

## 2021-11-07 DIAGNOSIS — G35 MULTIPLE SCLEROSIS (HCC): ICD-10-CM

## 2021-11-07 DIAGNOSIS — R53.82 CHRONIC FATIGUE: ICD-10-CM

## 2021-11-08 RX ORDER — FLUOXETINE 10 MG/1
10 CAPSULE ORAL DAILY
Qty: 90 CAPSULE | Refills: 3 | Status: SHIPPED
Start: 2021-11-08 | End: 2022-06-15

## 2021-11-08 NOTE — TELEPHONE ENCOUNTER
Received request via: Pharmacy    Was the patient seen in the last year in this department? Yes    Does the patient have an active prescription (recently filled or refills available) for medication(s) requested? Yes.   Spine appears normal, range of motion is not limited, no muscle or joint tenderness

## 2021-11-23 ENCOUNTER — APPOINTMENT (OUTPATIENT)
Dept: ONCOLOGY | Facility: MEDICAL CENTER | Age: 56
End: 2021-11-23
Attending: PSYCHIATRY & NEUROLOGY
Payer: COMMERCIAL

## 2021-11-24 DIAGNOSIS — G35 MULTIPLE SCLEROSIS (HCC): ICD-10-CM

## 2021-11-29 RX ORDER — NALTREXONE HYDROCHLORIDE 50 MG/1
TABLET, FILM COATED ORAL
Qty: 30 TABLET | Refills: 6 | Status: SHIPPED | OUTPATIENT
Start: 2021-11-29 | End: 2022-05-31 | Stop reason: SDUPTHER

## 2021-12-16 ENCOUNTER — TELEPHONE (OUTPATIENT)
Dept: NEUROLOGY | Facility: MEDICAL CENTER | Age: 56
End: 2021-12-16

## 2021-12-16 NOTE — TELEPHONE ENCOUNTER
Ema from [employer] [facility], [place] [phone] opt [phone]  To call back to let them know preferred shipment for med to [facility].  Let Ema know [facility]. Noted

## 2022-01-20 DIAGNOSIS — G35 MULTIPLE SCLEROSIS (HCC): ICD-10-CM

## 2022-01-20 DIAGNOSIS — R53.82 CHRONIC FATIGUE: ICD-10-CM

## 2022-01-24 RX ORDER — MODAFINIL 200 MG/1
200 TABLET ORAL DAILY
Qty: 90 TABLET | Refills: 3 | Status: SHIPPED | OUTPATIENT
Start: 2022-01-24 | End: 2022-01-25 | Stop reason: SDUPTHER

## 2022-01-25 DIAGNOSIS — R53.82 CHRONIC FATIGUE: ICD-10-CM

## 2022-01-25 DIAGNOSIS — G35 MULTIPLE SCLEROSIS (HCC): ICD-10-CM

## 2022-01-25 RX ORDER — MODAFINIL 200 MG/1
200 TABLET ORAL DAILY
Qty: 90 TABLET | Refills: 3 | Status: SHIPPED | OUTPATIENT
Start: 2022-01-25 | End: 2022-04-25

## 2022-02-08 DIAGNOSIS — F51.01 PRIMARY INSOMNIA: ICD-10-CM

## 2022-02-08 DIAGNOSIS — G35 MULTIPLE SCLEROSIS (HCC): ICD-10-CM

## 2022-02-08 RX ORDER — TEMAZEPAM 30 MG/1
CAPSULE ORAL
Qty: 90 CAPSULE | Refills: 0
Start: 2022-02-08 | End: 2022-02-14

## 2022-02-14 DIAGNOSIS — G35 MULTIPLE SCLEROSIS (HCC): ICD-10-CM

## 2022-02-14 RX ORDER — TEMAZEPAM 30 MG/1
CAPSULE ORAL
Qty: 90 CAPSULE | Refills: 0 | Status: SHIPPED | OUTPATIENT
Start: 2022-02-14 | End: 2022-05-16 | Stop reason: SDUPTHER

## 2022-05-16 DIAGNOSIS — G35 MULTIPLE SCLEROSIS (HCC): ICD-10-CM

## 2022-05-16 RX ORDER — BACLOFEN 10 MG/1
10 TABLET ORAL 3 TIMES DAILY
Qty: 270 TABLET | Refills: 3 | Status: SHIPPED | OUTPATIENT
Start: 2022-05-16 | End: 2023-05-09 | Stop reason: SDUPTHER

## 2022-05-16 RX ORDER — NORTRIPTYLINE HYDROCHLORIDE 25 MG/1
CAPSULE ORAL
Qty: 180 CAPSULE | Refills: 3 | Status: SHIPPED | OUTPATIENT
Start: 2022-05-16 | End: 2022-08-24 | Stop reason: SDUPTHER

## 2022-05-16 NOTE — TELEPHONE ENCOUNTER
Received request via: Patient    Was the patient seen in the last year in this department? Yes. Last seen on 06/03/2021    Will send message to me [facility] appt.     Does the patient have an active prescription (recently filled or refills available) for medication(s) requested? Yes.

## 2022-05-17 RX ORDER — TEMAZEPAM 30 MG/1
30 CAPSULE ORAL NIGHTLY PRN
Qty: 90 CAPSULE | Refills: 0 | Status: SHIPPED | OUTPATIENT
Start: 2022-05-17 | End: 2022-05-31 | Stop reason: SDUPTHER

## 2022-05-17 NOTE — TELEPHONE ENCOUNTER
Received request via: Pharmacy    Was the patient seen in the last year in this department? Yes    lv   6*3*21  fv   NONE    Does the patient have an active prescription (recently filled or refills available) for medication(s) requested? YES

## 2022-05-31 DIAGNOSIS — G35 MULTIPLE SCLEROSIS (HCC): ICD-10-CM

## 2022-05-31 RX ORDER — NALTREXONE HYDROCHLORIDE 50 MG/1
TABLET, FILM COATED ORAL
Qty: 30 TABLET | Refills: 6 | Status: SHIPPED | OUTPATIENT
Start: 2022-05-31 | End: 2022-06-08 | Stop reason: SDUPTHER

## 2022-05-31 RX ORDER — TEMAZEPAM 30 MG/1
30 CAPSULE ORAL NIGHTLY PRN
Qty: 90 CAPSULE | Refills: 0 | Status: SHIPPED | OUTPATIENT
Start: 2022-05-31 | End: 2022-06-08 | Stop reason: SDUPTHER

## 2022-06-08 DIAGNOSIS — F51.01 PRIMARY INSOMNIA: ICD-10-CM

## 2022-06-08 DIAGNOSIS — G35 MULTIPLE SCLEROSIS (HCC): ICD-10-CM

## 2022-06-08 DIAGNOSIS — R53.82 CHRONIC FATIGUE: ICD-10-CM

## 2022-06-08 RX ORDER — NALTREXONE HYDROCHLORIDE 50 MG/1
TABLET, FILM COATED ORAL
Qty: 30 TABLET | Refills: 6 | Status: SHIPPED | OUTPATIENT
Start: 2022-06-08 | End: 2022-08-24 | Stop reason: SDUPTHER

## 2022-06-08 RX ORDER — TEMAZEPAM 30 MG/1
30 CAPSULE ORAL NIGHTLY PRN
Qty: 90 CAPSULE | Refills: 0 | Status: SHIPPED | OUTPATIENT
Start: 2022-06-08 | End: 2022-07-19 | Stop reason: SDUPTHER

## 2022-06-15 ENCOUNTER — OFFICE VISIT (OUTPATIENT)
Dept: NEUROLOGY | Facility: MEDICAL CENTER | Age: 57
End: 2022-06-15
Attending: REGISTERED NURSE

## 2022-06-15 VITALS
OXYGEN SATURATION: 98 % | TEMPERATURE: 98.6 F | DIASTOLIC BLOOD PRESSURE: 78 MMHG | HEIGHT: 64 IN | WEIGHT: 213.19 LBS | BODY MASS INDEX: 36.4 KG/M2 | HEART RATE: 93 BPM | SYSTOLIC BLOOD PRESSURE: 128 MMHG

## 2022-06-15 DIAGNOSIS — G35 MULTIPLE SCLEROSIS (HCC): ICD-10-CM

## 2022-06-15 PROCEDURE — 99215 OFFICE O/P EST HI 40 MIN: CPT | Performed by: REGISTERED NURSE

## 2022-06-15 PROCEDURE — 99212 OFFICE O/P EST SF 10 MIN: CPT | Performed by: REGISTERED NURSE

## 2022-06-15 RX ORDER — FLUOXETINE HYDROCHLORIDE 20 MG/1
20 CAPSULE ORAL DAILY
Qty: 30 CAPSULE | Refills: 1 | Status: SHIPPED | OUTPATIENT
Start: 2022-06-15 | End: 2022-08-24 | Stop reason: SDUPTHER

## 2022-06-15 ASSESSMENT — FIBROSIS 4 INDEX: FIB4 SCORE: 0.89

## 2022-06-15 NOTE — PROGRESS NOTES
[facility]  MULTIPLE SCLEROSIS & NEUROIMMUNOLOGY  FOLLOW-UP VISIT    DISEASE SUMMARY:    CC: Multiple Sclerosis    INTERVAL HISTORY:  Heaven Chavis is a 56 y.o. female with a history of MS.  She no longer has insurance and is self pay.  I last saw Ms. Chavis  in the [facility] on 09/11/2020.    At that time she had FMLA paperwork,   Today,  was with her grandaughter and  provided the following interval history:    MS History:  Diagnosed: 2007  Lumbar puncture:  yes  First presentation: left optic neuritis  Disease modifying treatment: none              Current: none               Previous:   Former or other neurologist:   Last attack:   Last MRI:         A review of MS-related symptoms was notable for the following:    Fatigue: yes; daily.  good with chores for 10 minutes.  Weakness: yes; left side weaker than right.   Numbness: yes; right leg yesterday but resolved.  Incoordination: yes; fell recently but no injury  Spasms/Spasticity: no  Vision Impairment: yes; gradually decreasing   Walking/Balance Problems: yes; not great falls  Neuralgia: yes; once in a while  Bowel Symptoms: no  Bladder Symptoms: yes; nortrypltyline  Heat Sensitivity: yes; fatigue  Depression: yes; Prozac not working  Cognitive/Memory Problems: yes; terrible short term is bad  Sexual Dysfunction: N/D  Anxiety: no    MEDICATIONS:  Current Outpatient Medications   Medication Sig   • naltrexone (DEPADE) 50 MG Tab Take one capsule PO daily   • temazepam (RESTORIL) 30 MG capsule Take 1 Capsule by mouth at bedtime as needed for Sleep for up to 90 days.   • nortriptyline (PAMELOR) 25 MG Cap TAKE 1-2 CAPSULES BY MOUTH EVERY EVENING   • baclofen (LIORESAL) 10 MG Tab Take 1 Tablet by mouth in the morning and 1 Tablet at noon and 1 Tablet in the evening.   • FLUoxetine (PROZAC) 10 MG Cap Take 1 Capsule by mouth every day.   • Multiple Vitamins-Minerals (MULTIVITAMIN ADULT PO) Take  by mouth.   • Iron Carbonyl-Vitamin C-FOS (CHEWABLE IRON PO) Take   by mouth.   • B Complex Vitamins (VITAMIN B COMPLEX PO) Take  by mouth.   • Ascorbic Acid (VITAMIN C) 1000 MG Tab Take  by mouth.   • Omega 3-6-9 Fatty Acids (OMEGA 3-6-9 COMPLEX PO) Take  by mouth.   • vitamin D (CHOLECALCIFEROL) 1000 UNIT Tab Take 1,000 Units by mouth every day. 1,000 to 3,000 IUs a day   • SYNTHROID 150 MCG Tab Take 150 mcg by mouth every day.   • liothyronine (CYTOMEL) 5 MCG Tab TAKE 2 TABLETS BY MOUTH EVERY MORNING & TAKE 1 TABLET IN THE EVENING   • INTRAROSA 6.5 MG INSERT INSERT 1 TABLET VAGINALLY NIGHTLY AT BEDTIME (Patient not taking: Reported on 6/15/2022)     MEDICAL, SOCIAL, AND FAMILY HISTORY:  There is no change in the patient's ROS or medical, social, or family histories since the previous visit.    REVIEW OF SYSTEMS:  A ROS was completed.  Pertinent positives and negatives were included in the HPI, above.  All other systems were reviewed and are negative.    PHYSICAL EXAM:  General/Medical:  - NAD  - hair, skin, nails, and joints were normal  - neck was supple without Lhermitte's phenomenon  - heart rate and rhythm were regular, no carotid bruits appreciated    Neuro:  MENTAL STATUS: awake and alert; no deficits of speech or language; oriented to person, place, and time; affect was appropriate to situation    CRANIAL NERVES:    II:   pupils: 3/3 to 2/2 without a relative afferent pupillary defect, discs: sharp, no red desaturation noted    III/IV/VI: versions: intact without nystagmus    V: facial sensation: symmetric to light touch    VII: facial expression: symmetric    VIII: hearing: intact to finger rub    IX/X: palate: elevates symmetrically    XI: shoulder shrug: symmetric    XII: tongue: midline    MOTOR:  - bulk: normal throughout  - tone: normal throughout  Upper Extremity Strength  (R/L)    5/5   Elbow flexion 5/5   Elbow extension 5/5   Shoulder abduction 5/5     Lower Extremity Strength  (R/L)   Hip flexion 5/5   Knee extension 5/5   Knee flexion 5/5   Ankle  plantarflexion 5/5   Ankle dorsiflexion 5/5     - can walk on toes and heels  - pronator drift: absent  - abnormal movements: none    SENSATION:  - light touch: equal to bilateral sides of body  - vibration (R/L, seconds): N/T at the great toes  - pinprick: N/T  - proprioception: N/T  - Romberg: absent    COORDINATION:  - finger to nose: normal, no ataxia on exam  - finger tapping: rapid and accurate, bilaterally    REFLEXES:  Reflex Right Left   BR 2+ 2+   Biceps 2+ 2+   Triceps 2+ 2+   Patellae 2+ 2+   Achilles 2+ 2+   Toes down down     GAIT:  - narrow base and normal  - heel-raised/toe-raised gait: intact/intact  - tandem gait: intact    QUANTITATIVE SCORES:  Timed 25-foot walk (sec): 5.46.  Assistive device: none    REVIEW OF IMAGING STUDIES: I reviewed the following studies:  MRI Brain:  Date: 09/10/2020  Impression:  IMPRESSION:     1.  Scattered foci of abnormal white matter signal consistent with history of demyelinating disease, overall unchanged from prior exam dated 7/19/2019.  2.  No new or enhancing lesions to indicate active demyelination.    MRI Cervical Spine:  Date: 07/19/2019  Impression:  IMPRESSION:     1.  No abnormal cervical cord signal or enhancement.  2.  C5-C6 disc degeneration with moderate spinal stenosis. Severe left and moderate right foraminal stenosis.    MRI Thoracic Spine:  Date: 07/19/2019  Impression:  IMPRESSION:     1.  No abnormal thoracic cord signal or enhancement.  2.  Mild thoracic spondylosis as detailed without significant spinal stenosis.    REVIEW OF LABORATORY STUDIES:   Latest Reference Range & Units 09/22/20 13:57   WBC 4.8 - 10.8 K/uL 5.5   RBC 4.20 - 5.40 M/uL 4.85   Hemoglobin 12.0 - 16.0 g/dL 12.4   Hematocrit 37.0 - 47.0 % 39.2   MCV 81.4 - 97.8 fL 80.8 (L)   MCH 27.0 - 33.0 pg 25.6 (L)   MCHC 33.6 - 35.0 g/dL 31.6 (L)   RDW 35.9 - 50.0 fL 53.9 (H)   Platelet Count 164 - 446 K/uL 322   MPV 9.0 - 12.9 fL 10.4   Neutrophils-Polys 44.00 - 72.00 % 63.30    Neutrophils (Absolute) 2.00 - 7.15 K/uL 3.49   Lymphocytes 22.00 - 41.00 % 26.00   Lymphs (Absolute) 1.00 - 4.80 K/uL 1.43   Monocytes 0.00 - 13.40 % 8.70   Monos (Absolute) 0.00 - 0.85 K/uL 0.48   Eosinophils 0.00 - 6.90 % 0.90   Eos (Absolute) 0.00 - 0.51 K/uL 0.05   Basophils 0.00 - 1.80 % 0.90   Baso (Absolute) 0.00 - 0.12 K/uL 0.05   Immature Granulocytes 0.00 - 0.90 % 0.20   Immature Granulocytes (abs) 0.00 - 0.11 K/uL 0.01   Nucleated RBC /100 WBC 0.00   NRBC (Absolute) K/uL 0.00      [facility] Reference Range & Units 20 13:57   Sodium 135 - 145 mmol/L 138   Potassium 3.6 - 5.5 mmol/L 4.9   Chloride 96 - 112 mmol/L 102   Co2 20 - 33 mmol/L 25   Anion Gap 7.0 - 16.0  11.0   Glucose 65 - 99 mg/dL 100 (H)   Bun 8 - 22 mg/dL 19   Creatinine 0.50 - 1.40 mg/dL 0.71   GFR If African American >60 mL/min/1.73 m 2 >60   GFR If Non African American >60 mL/min/1.73 m 2 >60   Calcium 8.5 - 10.5 mg/dL 9.5   AST(SGOT) 12 - 45 U/L 22   ALT(SGPT) 2 - 50 U/L 20   Alkaline Phosphatase 30 - 99 U/L 125 (H)   Total Bilirubin 0.1 - 1.5 mg/dL 0.3   Albumin 3.2 - 4.9 g/dL 4.5   Total Protein 6.0 - 8.2 g/dL 7.1   Globulin 1.9 - 3.5 g/dL 2.6   A-G Ratio g/dL 1.7   (H): Data is abnormally high    ASSESSMENT:  Heaven Chavis is a 56 y.o. female with a hisoty of MS.  Not on any DMT at this time and no new flares.  She did experience left leg numbness that resloved and was not on going.  Verbalizes depression and thinks the 10mg of prozac only lasted in the beginning  Will increase to BID.  Waiting on her diability and no longer covered with her insurance since she retired.    PLAN:  1. Multiple sclerosis (HCC)    - FLUoxetine (PROZAC) 20 MG Cap; Take 1 Capsule by mouth every day.  Dispense: 30 Capsule; Refill: 1    -   -     Follow-Up:  - Three months VV to reassess depression/MS    Signed: Heaven Quintanilla ARosasPRosasRRODOLFO    EDUCATION AND COUNSELIN minutes was spent of which greater than 5% was invloved with education and  counseling.  The patient/family educated on diagnosis and prognosis. They understand MS is a chronic progressive disorder for which there is currently no cure. Despite best efforts in management, the condition is likely to progress and carries significant risk for disability including physical and cognitive.   -Effectiveness, indications, and safety profile of available Disease Modifying Agents (DMA’s) reviewed.   -Side effects of DMA’s were discussed, including: flu like symptoms, myalgias, injection site (infection, pain, bleeding), abnormal LFT’s, pancreatitis, weight changes, development of autoantibodies which may decrease effective of the medication, panic/anxiety attacks, abnormal blood counts (increase risk for bleeding, infections, cancer), increased risk for fetal complications (including congenital malformations, developmental/intellectual disability).    -The patient will require frequent follow up and monitoring.   -Laboratory monitoring every 3 months: CBC, CMP, thyroid panel, vitamin D, UA.   -Imaging of entire neuro-axis (brain and spinal cord) with MRI’s w/wo contrast, every six months.   -Patient/family counseled on medication compliance.

## 2022-07-19 DIAGNOSIS — F51.01 PRIMARY INSOMNIA: ICD-10-CM

## 2022-07-19 RX ORDER — TEMAZEPAM 30 MG/1
30 CAPSULE ORAL NIGHTLY PRN
Qty: 90 CAPSULE | Refills: 0 | Status: SHIPPED | OUTPATIENT
Start: 2022-07-19 | End: 2022-08-24 | Stop reason: SDUPTHER

## 2022-08-24 DIAGNOSIS — R53.82 CHRONIC FATIGUE: ICD-10-CM

## 2022-08-24 DIAGNOSIS — G35 MULTIPLE SCLEROSIS (HCC): ICD-10-CM

## 2022-08-24 DIAGNOSIS — F51.01 PRIMARY INSOMNIA: ICD-10-CM

## 2022-08-26 RX ORDER — TEMAZEPAM 30 MG/1
30 CAPSULE ORAL NIGHTLY PRN
Qty: 90 CAPSULE | Refills: 0 | Status: SHIPPED | OUTPATIENT
Start: 2022-08-26 | End: 2022-11-24

## 2022-08-26 RX ORDER — NORTRIPTYLINE HYDROCHLORIDE 25 MG/1
CAPSULE ORAL
Qty: 180 CAPSULE | Refills: 3 | Status: SHIPPED
Start: 2022-08-26 | End: 2022-09-15

## 2022-08-26 RX ORDER — FLUOXETINE HYDROCHLORIDE 20 MG/1
20 CAPSULE ORAL DAILY
Qty: 30 CAPSULE | Refills: 1 | Status: SHIPPED | OUTPATIENT
Start: 2022-08-26 | End: 2022-10-21 | Stop reason: SDUPTHER

## 2022-08-26 RX ORDER — NALTREXONE HYDROCHLORIDE 50 MG/1
TABLET, FILM COATED ORAL
Qty: 30 TABLET | Refills: 6 | Status: SHIPPED | OUTPATIENT
Start: 2022-08-26

## 2022-09-15 ENCOUNTER — TELEMEDICINE (OUTPATIENT)
Dept: NEUROLOGY | Facility: MEDICAL CENTER | Age: 57
End: 2022-09-15
Attending: REGISTERED NURSE

## 2022-09-15 VITALS — HEART RATE: 72 BPM

## 2022-09-15 DIAGNOSIS — G35 MULTIPLE SCLEROSIS (HCC): ICD-10-CM

## 2022-09-15 DIAGNOSIS — F32.9 REACTIVE DEPRESSION: ICD-10-CM

## 2022-09-15 DIAGNOSIS — R63.5 WEIGHT GAIN: ICD-10-CM

## 2022-09-15 PROCEDURE — 99215 OFFICE O/P EST HI 40 MIN: CPT | Mod: 95 | Performed by: REGISTERED NURSE

## 2022-09-15 RX ORDER — BUPROPION HYDROCHLORIDE 150 MG/1
150 TABLET ORAL EVERY MORNING
Qty: 30 TABLET | Refills: 11 | Status: SHIPPED | OUTPATIENT
Start: 2022-09-15 | End: 2023-05-09 | Stop reason: SDUPTHER

## 2022-09-15 ASSESSMENT — PATIENT HEALTH QUESTIONNAIRE - PHQ9
9. THOUGHTS THAT YOU WOULD BE BETTER OFF DEAD, OR OF HURTING YOURSELF: NOT AT ALL
7. TROUBLE CONCENTRATING ON THINGS, SUCH AS READING THE NEWSPAPER OR WATCHING TELEVISION: SEVERAL DAYS
3. TROUBLE FALLING OR STAYING ASLEEP OR SLEEPING TOO MUCH: NEARLY EVERY DAY
5. POOR APPETITE OR OVEREATING: NOT AT ALL
8. MOVING OR SPEAKING SO SLOWLY THAT OTHER PEOPLE COULD HAVE NOTICED. OR THE OPPOSITE, BEING SO FIGETY OR RESTLESS THAT YOU HAVE BEEN MOVING AROUND A LOT MORE THAN USUAL: SEVERAL DAYS
2. FEELING DOWN, DEPRESSED, IRRITABLE, OR HOPELESS: NOT AT ALL
4. FEELING TIRED OR HAVING LITTLE ENERGY: NEARLY EVERY DAY
6. FEELING BAD ABOUT YOURSELF - OR THAT YOU ARE A FAILURE OR HAVE LET YOURSELF OR YOUR FAMILY DOWN: NOT AL ALL
SUM OF ALL RESPONSES TO PHQ QUESTIONS 1-9: 8
1. LITTLE INTEREST OR PLEASURE IN DOING THINGS: NOT AT ALL
SUM OF ALL RESPONSES TO PHQ9 QUESTIONS 1 AND 2: 0

## 2022-09-15 NOTE — PROGRESS NOTES
Virtual Visit: Established Patient   This visit was conducted via Zoom using secure and encrypted videoconferencing technology.   The patient was in their home in the [facility].    The patient's identity was confirmed and verbal consent was obtained for this virtual visit.     Subjective:   CC:   Chief Complaint   Patient presents with    Follow-Up     ms     MS History:  Diagnosed: 2007  Lumbar puncture:  yes  First presentation: left optic neuritis  Disease modifying treatment: none              Current: none               Previous: Ocrevus  and eye went numb   Former or other neurologist:   Last attack:   Last MRI:     Heaven Chavis is a 57 y.o. female presenting for evaluation and management of: Multiple Sclerosis.  She is being seen on zoom and is alone.    No insurance and feels she is just gaining weight.  Unable to go out in the heat.  No new symptoms  Brain fog is really bad, sometimes can't remember names  Fall x1 this month  No insurance      ROS:   Constitutional: No fevers or chills.  Eyes: No blurry vision or eye pain.  ENT: No dysphagia or hearing loss.  Respiratory: No cough or shortness of breath.  Cardiovascular: No chest pain or palpitations.  GI: No nausea, vomiting, or diarrhea.  : No urinary incontinence or dysuria.  Musculoskeletal: No joint swelling or arthralgias.  Skin: No skin rashes.  Neuro: No headaches, dizziness, or tremors.  Endocrine: No heat or cold intolerance. No polydipsia or polyuria.  Psych: No depression or anxiety.  Heme/Lymph: No easy bruising or swollen lymph nodes.  All other review of systems were reviewed and were negative      Current medicines (including changes today)  Current Outpatient Medications   Medication Sig Dispense Refill    nortriptyline (PAMELOR) 25 MG Cap TAKE 1-2 CAPSULES BY MOUTH EVERY EVENING 180 Capsule 3    naltrexone (DEPADE) 50 MG Tab Take one capsule PO daily 30 Tablet 6    FLUoxetine (PROZAC) 20 MG Cap Take 1 Capsule by mouth every day.  30 Capsule 1    temazepam (RESTORIL) 30 MG capsule Take 1 Capsule by mouth at bedtime as needed for Sleep for up to 90 days. 90 Capsule 0    baclofen (LIORESAL) 10 MG Tab Take 1 Tablet by mouth in the morning and 1 Tablet at noon and 1 Tablet in the evening. 270 Tablet 3    Multiple Vitamins-Minerals (MULTIVITAMIN ADULT PO) Take  by mouth.      Iron Carbonyl-Vitamin C-FOS (CHEWABLE IRON PO) Take  by mouth.      INTRAROSA 6.5 MG INSERT INSERT 1 TABLET VAGINALLY NIGHTLY AT BEDTIME (Patient not taking: Reported on 6/15/2022)  3    B Complex Vitamins (VITAMIN B COMPLEX PO) Take  by mouth.      Ascorbic Acid (VITAMIN C) 1000 MG Tab Take  by mouth.      Omega 3-6-9 Fatty Acids (OMEGA 3-6-9 COMPLEX PO) Take  by mouth.      vitamin D (CHOLECALCIFEROL) 1000 UNIT Tab Take 1,000 Units by mouth every day. 1,000 to 3,000 IUs a day      SYNTHROID 150 MCG Tab Take 150 mcg by mouth every day.  11    liothyronine (CYTOMEL) 5 MCG Tab TAKE 2 TABLETS BY MOUTH EVERY MORNING & TAKE 1 TABLET IN THE EVENING  11     No current facility-administered medications for this visit.       Patient Active Problem List    Diagnosis Date Noted    Palpitations 01/06/2021    Reactive depression 05/20/2020    Iron deficiency anemia 05/19/2020    Multiple sclerosis (HCC) 03/12/2019    Postoperative hypothyroidism 03/12/2019    Eczema 03/12/2019        Objective:   There were no vitals taken for this visit.    Physical Exam:  Constitutional: Alert, no distress, well-groomed.  Skin: No rashes in visible areas.  Eye: Round. Conjunctiva clear, lids normal. No icterus.   ENMT: Lips pink without lesions, good dentition, moist mucous membranes. Phonation normal.  Neck: No masses, no thyromegaly. Moves freely without pain.  Respiratory: Unlabored respiratory effort, no cough or audible wheeze  Psych: Alert and oriented x3, normal affect and mood.     Assessment and Plan:   Heaven Chavis is a 57 year old with MS.  Currently without insurance and not on DMT.   Complains today of weight gain as she has been staying in and avoiding heat and exercise.  Feels she has a flat affect and would like to try wellbutrin.  She states she is not getting any benefit from nortriptyline and has been on and off if it for pain concerns.  Not interested in PT as she is self pay at this time.  We discussed yoga type u tube video's to get some activity.   Verbalized she will try that.     1. Reactive depression  Depressed waiting on disability and weight gain     - buPROPion (WELLBUTRIN XL) 150 MG XL tablet; Take 1 Tablet by mouth every morning.  Dispense: 30 Tablet; Refill: 11    2. Weight gain  Encouraged no flour/sugarintake    3. Multiple sclerosis (HCC)  Stable at this time.      Follow-up:     Six months    EDUCATION AND COUNSELIN minutes was spent of which greater than 5% was invloved with education and counseling.  The patient/family educated on diagnosis and prognosis. They understand MS is a chronic progressive disorder for which there is currently no cure. Despite best efforts in management, the condition is likely to progress and carries significant risk for disability including physical and cognitive.   -Effectiveness, indications, and safety profile of available Disease Modifying Agents (DMA’s) reviewed.   -Side effects of DMA’s were discussed, including: flu like symptoms, myalgias, injection site (infection, pain, bleeding), abnormal LFT’s, pancreatitis, weight changes, development of autoantibodies which may decrease effective of the medication, panic/anxiety attacks, abnormal blood counts (increase risk for bleeding, infections, cancer), increased risk for fetal complications (including congenital malformations, developmental/intellectual disability).    -The patient will require frequent follow up and monitoring.   -Laboratory monitoring every 3 months: CBC, CMP, thyroid panel, vitamin D, UA.   -Imaging of entire neuro-axis (brain and spinal cord) with MRI’s w/wo  contrast, every six months.   -Patient/family counseled on medication compliance.

## 2022-10-21 ENCOUNTER — TELEPHONE (OUTPATIENT)
Dept: NEUROLOGY | Facility: MEDICAL CENTER | Age: 57
End: 2022-10-21

## 2022-10-21 DIAGNOSIS — G35 MULTIPLE SCLEROSIS (HCC): ICD-10-CM

## 2022-10-24 DIAGNOSIS — G35 MULTIPLE SCLEROSIS (HCC): ICD-10-CM

## 2022-10-24 RX ORDER — MODAFINIL 200 MG/1
200 TABLET ORAL DAILY
Qty: 30 TABLET | Refills: 0 | Status: SHIPPED | OUTPATIENT
Start: 2022-10-24 | End: 2022-11-01 | Stop reason: SDUPTHER

## 2022-10-24 RX ORDER — FLUOXETINE HYDROCHLORIDE 20 MG/1
20 CAPSULE ORAL DAILY
Qty: 30 CAPSULE | Refills: 1 | Status: SHIPPED | OUTPATIENT
Start: 2022-10-24 | End: 2023-05-09

## 2022-11-01 DIAGNOSIS — G35 MULTIPLE SCLEROSIS (HCC): ICD-10-CM

## 2022-11-03 RX ORDER — MODAFINIL 200 MG/1
200 TABLET ORAL DAILY
Qty: 30 TABLET | Refills: 0 | Status: SHIPPED | OUTPATIENT
Start: 2022-11-03 | End: 2023-02-01

## 2023-01-17 ENCOUNTER — TELEPHONE (OUTPATIENT)
Dept: NEUROLOGY | Facility: MEDICAL CENTER | Age: 58
End: 2023-01-17

## 2023-01-17 NOTE — TELEPHONE ENCOUNTER
Received request via: Patient    Was the patient seen in the last year in this department? Yes    Does the patient have an active prescription (recently filled or refills available) for medication(s) requested? Yes.     Does the patient have FDC Plus and need 100 day supply (blood pressure, diabetes and cholesterol meds only)? No

## 2023-01-18 DIAGNOSIS — G35 MULTIPLE SCLEROSIS (HCC): ICD-10-CM

## 2023-01-18 DIAGNOSIS — F51.01 PRIMARY INSOMNIA: ICD-10-CM

## 2023-01-18 RX ORDER — TEMAZEPAM 30 MG/1
30 CAPSULE ORAL NIGHTLY PRN
Qty: 90 CAPSULE | Refills: 0 | Status: SHIPPED | OUTPATIENT
Start: 2023-01-18 | End: 2023-04-14 | Stop reason: SDUPTHER

## 2023-04-14 DIAGNOSIS — F51.01 PRIMARY INSOMNIA: ICD-10-CM

## 2023-04-14 DIAGNOSIS — G35 MULTIPLE SCLEROSIS (HCC): ICD-10-CM

## 2023-04-17 RX ORDER — TEMAZEPAM 30 MG/1
CAPSULE ORAL
Qty: 90 CAPSULE | Refills: 0 | Status: SHIPPED
Start: 2023-04-17 | End: 2023-05-09

## 2023-04-17 RX ORDER — TEMAZEPAM 30 MG/1
30 CAPSULE ORAL NIGHTLY PRN
Qty: 90 CAPSULE | Refills: 0 | Status: SHIPPED | OUTPATIENT
Start: 2023-04-17 | End: 2023-07-19 | Stop reason: SDUPTHER

## 2023-04-17 NOTE — TELEPHONE ENCOUNTER
Received request via: Patient    Was the patient seen in the last year in this department? Yes    Does the patient have an active prescription (recently filled or refills available) for medication(s) requested? Yes.     Does the patient have detention Plus and need 100 day supply (blood pressure, diabetes and cholesterol meds only)? Medication is not for cholesterol, blood pressure or diabetes

## 2023-04-17 NOTE — TELEPHONE ENCOUNTER
Received request via: Pharmacy    Was the patient seen in the last year in this department? Yes    Does the patient have an active prescription (recently filled or refills available) for medication(s) requested? Yes.     Does the patient have intermediate Plus and need 100 day supply (blood pressure, diabetes and cholesterol meds only)? Medication is not for cholesterol, blood pressure or diabetes

## 2023-05-03 ENCOUNTER — TELEPHONE (OUTPATIENT)
Dept: NEUROLOGY | Facility: MEDICAL CENTER | Age: 58
End: 2023-05-03

## 2023-05-09 ENCOUNTER — OFFICE VISIT (OUTPATIENT)
Dept: NEUROLOGY | Facility: MEDICAL CENTER | Age: 58
End: 2023-05-09
Attending: PSYCHIATRY & NEUROLOGY

## 2023-05-09 VITALS
BODY MASS INDEX: 38.77 KG/M2 | HEIGHT: 64 IN | TEMPERATURE: 97.8 F | OXYGEN SATURATION: 97 % | WEIGHT: 227.07 LBS | HEART RATE: 78 BPM | SYSTOLIC BLOOD PRESSURE: 122 MMHG | DIASTOLIC BLOOD PRESSURE: 78 MMHG

## 2023-05-09 DIAGNOSIS — R53.82 CHRONIC FATIGUE: ICD-10-CM

## 2023-05-09 DIAGNOSIS — F32.9 REACTIVE DEPRESSION: ICD-10-CM

## 2023-05-09 DIAGNOSIS — G35 MULTIPLE SCLEROSIS (HCC): ICD-10-CM

## 2023-05-09 PROCEDURE — 99215 OFFICE O/P EST HI 40 MIN: CPT | Performed by: PSYCHIATRY & NEUROLOGY

## 2023-05-09 PROCEDURE — 99212 OFFICE O/P EST SF 10 MIN: CPT | Performed by: PSYCHIATRY & NEUROLOGY

## 2023-05-09 RX ORDER — BACLOFEN 10 MG/1
10 TABLET ORAL 3 TIMES DAILY
Qty: 270 TABLET | Refills: 3 | Status: SHIPPED | OUTPATIENT
Start: 2023-05-09 | End: 2023-08-25 | Stop reason: SDUPTHER

## 2023-05-09 RX ORDER — BUPROPION HYDROCHLORIDE 150 MG/1
150 TABLET ORAL EVERY MORNING
Qty: 30 TABLET | Refills: 11 | Status: SHIPPED | OUTPATIENT
Start: 2023-05-09 | End: 2023-08-24

## 2023-05-09 RX ORDER — NORTRIPTYLINE HYDROCHLORIDE 25 MG/1
25 CAPSULE ORAL NIGHTLY
COMMUNITY
End: 2023-05-09 | Stop reason: SDUPTHER

## 2023-05-09 RX ORDER — NORTRIPTYLINE HYDROCHLORIDE 25 MG/1
25 CAPSULE ORAL NIGHTLY
Qty: 90 CAPSULE | Refills: 3 | Status: SHIPPED | OUTPATIENT
Start: 2023-05-09 | End: 2023-08-07

## 2023-05-09 ASSESSMENT — PATIENT HEALTH QUESTIONNAIRE - PHQ9
5. POOR APPETITE OR OVEREATING: 2 - MORE THAN HALF THE DAYS
CLINICAL INTERPRETATION OF PHQ2 SCORE: 3
SUM OF ALL RESPONSES TO PHQ QUESTIONS 1-9: 10

## 2023-05-09 NOTE — PROGRESS NOTES
Chief Complaint   Patient presents with    Follow-Up     MS       Problem List Items Addressed This Visit       Multiple sclerosis (HCC)     Pt had the the ON in her left eye and she had issues with pixels and she felt it looked like a computer. Pt took her last MS medication in 2021 and then she lost her insurance. Pt states she has lost ability to walk distances. She has weakness in her legs and the right worse than the left Pt states that she took some supplements. Pt states that it happened for 2 days. Pt is applying for disability and it is supposed to go to trial. Pt states that she went to a comprehensive cognitive test and she states she passed. Pt stopped working in June of 2021 because she was making mistakes at work as a  and she was resigned from that job. Pt states she did not get any disability insurance. Pt is currently applying for disability and she needs info sent to her .         Relevant Medications    baclofen (LIORESAL) 10 MG Tab    nortriptyline (PAMELOR) 25 MG Cap    Other Relevant Orders    MR-BRAIN-WITH & W/O    Reactive depression    Relevant Medications    nortriptyline (PAMELOR) 25 MG Cap    buPROPion (WELLBUTRIN XL) 150 MG XL tablet     Other Visit Diagnoses       Chronic fatigue                History of present illness:  Heaven April Partha 57 y.o. female presents today for progression of her multiple sclerosis.    Past medical history:   Past Medical History:   Diagnosis Date    Hashimoto's disease 2004    MS (multiple sclerosis) ([facility]) 2007       Past surgical history:   Past Surgical History:   Procedure Laterality Date    ABDOMINAL HYSTERECTOMY TOTAL      CHOLECYSTECTOMY      FOOT SURGERY      left foot reattached       Family history:   Family History   Problem Relation Age of Onset    Stroke Mother     Clotting Disorder Father     Thyroid Sister     Thyroid Sister     Diabetes Sister     Thyroid Sister     Thyroid Sister     Thyroid Sister     Thyroid Sister         Social history:   Social History     Socioeconomic History    Marital status:      Spouse name: Not on file    Number of children: Not on file    Years of education: Not on file    Highest education level: Not on file   Occupational History    Not on file   Tobacco Use    Smoking status: Former     Types: Cigarettes     Quit date:      Years since quittin.3    Smokeless tobacco: Never   Vaping Use    Vaping Use: Never used   Substance and Sexual Activity    Alcohol use: Not Currently     Comment: occ    Drug use: No    Sexual activity: Yes   Other Topics Concern    Not on file   Social History Narrative    Not on file     Social Determinants of Health     Financial Resource Strain: Not on file   Food Insecurity: Not on file   Transportation Needs: Not on file   Physical Activity: Not on file   Stress: Not on file   Social Connections: Not on file   Intimate Partner Violence: Not on file   Housing Stability: Not on file       Current medications:   Current Outpatient Medications   Medication    baclofen (LIORESAL) 10 MG Tab    nortriptyline (PAMELOR) 25 MG Cap    buPROPion (WELLBUTRIN XL) 150 MG XL tablet    temazepam (RESTORIL) 30 MG capsule    naltrexone (DEPADE) 50 MG Tab    Multiple Vitamins-Minerals (MULTIVITAMIN ADULT PO)    Iron Carbonyl-Vitamin C-FOS (CHEWABLE IRON PO)    B Complex Vitamins (VITAMIN B COMPLEX PO)    Ascorbic Acid (VITAMIN C) 1000 MG Tab    Omega 3-6-9 Fatty Acids (OMEGA 3-6-9 COMPLEX PO)    vitamin D (CHOLECALCIFEROL) 1000 UNIT Tab    SYNTHROID 150 MCG Tab    liothyronine (CYTOMEL) 5 MCG Tab     No current facility-administered medications for this visit.       Medication Allergy:  Allergies   Allergen Reactions    Milk Products Food Allergy Hives    Citrus Hives and Itching       Review of systems:   Constitutional: denies fever, night sweats, weight loss.   Eyes: denies acute vision change, eye pain or secretion.   Ears, Nose, Mouth, Throat: denies nasal secretion,  "nasal bleeding, difficulty swallowing, hearing loss, tinnitus, vertigo, ear pain, acute dental problems, oral ulcers or lesions.   Endocrine: denies recent weight changes, heat or cold intolerance, polyuria, polydypsia, polyphagia,abnormal hair growth.  Cardiovascular: denies new onset of chest pain, palpitations, syncope, or dyspnea of exertion.  Pulmonary: denies shortness of breath, new onset of cough, hemoptysis, wheezing, chest pain or flu-like symptoms.   GI: denies nausea, vomiting, diarrhea, GI bleeding, change in appetite, abdominal pain, and change in bowel habits.  : denies dysuria, urinary incontinence, hematuria.  Heme/oncology: denies history of easy bruising or bleeding. No history of cancer, DVTor PE.  Allergy/immunology: denies hives/urticaria, or itching.   Dermatologic: denies new rash, or new skin lesions.  Musculoskeletal:denies joint swelling or pain, muscle pain, neck and back pain. Neurologic: denies headaches, acute visual changes, facial droopiness, muscle weakness (focal or generalized), paresthesias, anesthesia, ataxia, change in speech or language, memory loss, abnormal movements, seizures, loss of consciousness, or episodes of confusion.   Psychiatric: denies symptoms of depression, anxiety, hallucinations, mood swings or changes, suicidal or homicidal thoughts.     Physical examination:   Vitals:    05/09/23 1342   BP: 122/78   BP Location: Right arm   Patient Position: Sitting   BP Cuff Size: Adult   Pulse: 78   Temp: 36.6 °C (97.8 °F)   TempSrc: Temporal   SpO2: 97%   Weight: 103 kg (227 lb 1.2 oz)   Height: 1.626 m (5' 4\")     General: Patient in no acute distress, pleasant and cooperative.  HEENT: Normocephalic, no signs of acute trauma.   Neck: supple, no meningeal signs or carotid bruits. There is normal range of motion. No tenderness on exam.   Chest: clear to auscultation. No cough.   CV: RRR, no murmurs.   Skin: no signs of acute rashes or trauma.   Musculoskeletal: joints " exhibit full range of motion, without any pain to palpation. There are no signs of joint or muscle swelling. There is no tenderness to deep palpation of muscles.   Psychiatric: No hallucinatory behavior. Denies symptoms of depression or suicidal ideation. Mood and affect appear normal on exam.     NEUROLOGICAL EXAM:   Mental status, orientation: Awake, alert and fully oriented.   Speech and language: speech is clear and fluent. The patient is able to name, repeat and comprehend.   Memory: There is intact recollection of recent and remote events.   Cranial nerve exam: Pupils are 3-4 mm bilaterally and equally reactive to light and accommodation. Visual fields are intact by confrontation. Fundoscopic exam was unremarkable. There is no nystagmus on primary or secondary gaze. Intact full EOM in all directions of gaze. Face appears symmetric. Sensation in the face is intact to light touch. Uvula is midline. Palate elevates symmetrically. Tongue is midline and without any signs of tongue biting or fasciculations. Sternocleidomastoid muscles exhibit is normal strength bilaterally. Shoulder shrug is intact bilaterally.   Motor exam: Strength is 5/5 in all extremities. Tone is normal. No abnormal movements were seen on exam.   Sensory exam reveals normal sense of light touch, proprioception, vibration and pinprick in all extremities.   Deep tendon reflexes:  2+ throughout. Plantar responses are flexor. There is no clonus.   Coordination: shows a normal finger-nose-finger. Normal rapidly alternating movements.   Gait: The patient was able to get up from seated position on first attempt with requiring assistance.   Broad Base gait    ANCILLARY DATA REVIEWED:     Lab Data Review:  No results found for this or any previous visit (from the past 24 hour(s)).    Records reviewed: Records from [facility] Leonardo since the last visit.      Imaging: I reviewed MRIs consistent with multiple sclerosis.          ASSESSMENT AND PLAN:    1.  Multiple sclerosis (HCC)  With progress in of her multiple sclerosis as the last visit plan at this time is to refer her to the  MS AA for assistance with getting another MRI scan done.  Also to consider treatment with medications post that MRI scan.  I have refilled her medications as below.  - MR-BRAIN-WITH & W/O; Future  - baclofen (LIORESAL) 10 MG Tab; Take 1 Tablet by mouth 3 times a day.  Dispense: 270 Tablet; Refill: 3  - nortriptyline (PAMELOR) 25 MG Cap; Take 1 Capsule by mouth every evening for 90 days.  Dispense: 90 Capsule; Refill: 3    2. Chronic fatigue  We discussed treatment options but at this point patient cannot afford more medications.    3. Reactive depression  Patient is taking bupropion and I have referred her to access to healthcare where she may get more help if she needs mental health counseling.  - buPROPion (WELLBUTRIN XL) 150 MG XL tablet; Take 1 Tablet by mouth every morning.  Dispense: 30 Tablet; Refill: 11        FOLLOW-UP:   Return in about 6 months (around 11/9/2023).      My total time spent caring for the patient on the day of the encounter was 53 minutes.   This does not include time spent on separately billable procedures/tests.     EDUCATION AND COUNSELING:  -Discussed regular exercise program and prevention of cardiovascular disease, including stroke.   -Discussed healthy lifestyle, including: healthy diet (rich in fruits, vegetables, nuts and healthy oils); proper hydration, and adequate sleep hygiene (allowing 7-8 hrs of overnight sleep).        Melissa Bloch, MD  Clinical  of Neurology [facility] [employer].   Diplomate in Neurology.   Office: [phone]  Fax: [phone]

## 2023-05-09 NOTE — ASSESSMENT & PLAN NOTE
Pt had the the ON in her left eye and she had issues with pixels and she felt it looked like a computer. Pt took her last MS medication in 2021 and then she lost her insurance. Pt states she has lost ability to walk distances. She has weakness in her legs and the right worse than the left Pt states that she took some supplements that did not really helped her.  Variant seen extreme muscle fatigue where she cannot walk any longer than 5 minutes without resting.  Her pain in his leg is increased after standing.  Patient also has pain and cramping in her hands and arms.  Patient feels cognitively that the brain fog is permanent now and she also has noticed issues with attention and memory.  Patient has bladder issues and has incontinence.  Pt is applying for disability and it is supposed to go to trial. Pt states that she went to a comprehensive cognitive test and she states she passed. Pt stopped working in June of 2021 because she was making mistakes at work as a  and she was resigned from that job. Pt states she did not get any disability insurance. Pt is currently applying for disability and she needs info sent to her .

## 2023-06-05 ENCOUNTER — TELEPHONE (OUTPATIENT)
Dept: NEUROLOGY | Facility: MEDICAL CENTER | Age: 58
End: 2023-06-05

## 2023-06-05 NOTE — TELEPHONE ENCOUNTER
Heaven Chavis April   Neurology [facility]  Phone Number: [phone]     I now have a solid white spot on my right eye.  It’s small but it’s like a cataract.     Sent via [employer]

## 2023-06-05 NOTE — TELEPHONE ENCOUNTER
I recommend a visit to the eye doctor to evaluate as they have the necessary equipment. Contact some to see when you can get an appointment at a location and time that works best for you.   Regards, Dr Bloch     Called pt  to call back.

## 2023-06-30 DIAGNOSIS — F32.9 REACTIVE DEPRESSION: ICD-10-CM

## 2023-07-19 DIAGNOSIS — G35 MULTIPLE SCLEROSIS (HCC): ICD-10-CM

## 2023-07-19 DIAGNOSIS — F32.9 REACTIVE DEPRESSION: ICD-10-CM

## 2023-07-19 DIAGNOSIS — F51.01 PRIMARY INSOMNIA: ICD-10-CM

## 2023-07-20 RX ORDER — TEMAZEPAM 30 MG/1
30 CAPSULE ORAL NIGHTLY PRN
Qty: 90 CAPSULE | Refills: 2 | Status: SHIPPED | OUTPATIENT
Start: 2023-07-20 | End: 2023-10-18

## 2023-07-20 NOTE — TELEPHONE ENCOUNTER
Received request via: Pharmacy    Was the patient seen in the last year in this department? Yes    Does the patient have an active prescription (recently filled or refills available) for medication(s) requested? Yes.   Does the patient have [facility]FP Plus and need 100 day supply (blood pressure, diabetes and cholesterol meds only)? Patient does not have SCP

## 2023-08-15 ENCOUNTER — TELEPHONE (OUTPATIENT)
Dept: NEUROLOGY | Facility: MEDICAL CENTER | Age: 58
End: 2023-08-15

## 2023-08-24 ENCOUNTER — OFFICE VISIT (OUTPATIENT)
Dept: NEUROLOGY | Facility: MEDICAL CENTER | Age: 58
End: 2023-08-24
Attending: PSYCHIATRY & NEUROLOGY

## 2023-08-24 VITALS
DIASTOLIC BLOOD PRESSURE: 74 MMHG | TEMPERATURE: 97.1 F | BODY MASS INDEX: 36.89 KG/M2 | SYSTOLIC BLOOD PRESSURE: 110 MMHG | HEIGHT: 64 IN | OXYGEN SATURATION: 97 % | WEIGHT: 216.05 LBS | HEART RATE: 99 BPM

## 2023-08-24 DIAGNOSIS — F32.9 REACTIVE DEPRESSION: ICD-10-CM

## 2023-08-24 DIAGNOSIS — G35 MULTIPLE SCLEROSIS (HCC): ICD-10-CM

## 2023-08-24 DIAGNOSIS — N32.81 OVERACTIVE BLADDER: ICD-10-CM

## 2023-08-24 PROCEDURE — 99215 OFFICE O/P EST HI 40 MIN: CPT | Performed by: PSYCHIATRY & NEUROLOGY

## 2023-08-24 PROCEDURE — 3074F SYST BP LT 130 MM HG: CPT | Performed by: PSYCHIATRY & NEUROLOGY

## 2023-08-24 PROCEDURE — 3078F DIAST BP <80 MM HG: CPT | Performed by: PSYCHIATRY & NEUROLOGY

## 2023-08-24 PROCEDURE — 99212 OFFICE O/P EST SF 10 MIN: CPT | Performed by: PSYCHIATRY & NEUROLOGY

## 2023-08-24 RX ORDER — MODAFINIL 200 MG/1
200 TABLET ORAL DAILY
Qty: 90 TABLET | Refills: 1 | Status: SHIPPED | OUTPATIENT
Start: 2023-08-24 | End: 2023-09-07 | Stop reason: SDUPTHER

## 2023-08-24 RX ORDER — MODAFINIL 200 MG/1
TABLET ORAL
COMMUNITY
Start: 2013-04-23 | End: 2023-08-24 | Stop reason: SDUPTHER

## 2023-08-24 RX ORDER — NORTRIPTYLINE HYDROCHLORIDE 25 MG/1
CAPSULE ORAL
COMMUNITY
Start: 2009-08-23 | End: 2023-08-25 | Stop reason: SDUPTHER

## 2023-08-24 RX ORDER — OXYBUTYNIN CHLORIDE 5 MG/1
5 TABLET ORAL 3 TIMES DAILY
Qty: 90 TABLET | Refills: 5 | Status: SHIPPED | OUTPATIENT
Start: 2023-08-24 | End: 2023-08-25 | Stop reason: SDUPTHER

## 2023-08-24 NOTE — PROGRESS NOTES
Chief Complaint   Patient presents with    Follow-Up     MS       Problem List Items Addressed This Visit       Multiple sclerosis (HCC)     Pt states that she has more urinary incontinence and she feels like it leaking a lot when she goes. Pt states that if she takes the modafinil she can get things done. Pt feels like she has some decreased motivation. Pt states that she has a court hearing on the 14th of September. Pt states she has a lot of cognitive and concentration problems and she cannot do the job she used to do. Pt is disabled from her MS. Pt cannot walk like she used too. Pt states she had a lot of pain in her left leg and it is the whole leg and she has seizing up and she stumbles and it stops working. Pt uses a walker so she does not fall. Pt lives by herself but she has family and friends check with her everyday. Pt states since her  passed away she has to rely on others in her vicinity. Pt has had a hysterectomy and she has no cancer history. Pt states that she is concerned about brain fog and disease progression.         Relevant Medications    modafinil (PROVIGIL) 200 MG Tab    baclofen (LIORESAL) 10 MG Tab    nortriptyline (PAMELOR) 25 MG Cap    Reactive depression    Relevant Medications    sertraline (ZOLOFT) 50 MG Tab    nortriptyline (PAMELOR) 25 MG Cap     Other Visit Diagnoses       Overactive bladder        Relevant Medications    oxybutynin (DITROPAN) 5 MG Tab    nortriptyline (PAMELOR) 25 MG Cap            History of present illness:  Heaven Chavis 58 y.o. female presents today for MS with new complaints of overactive bladder and she feels like she has to use pads and use the bathroom multiple times a day.    Past medical history:   Past Medical History:   Diagnosis Date    Hashimoto's disease 2004    MS (multiple sclerosis) ([facility]) 2007       Past surgical history:   Past Surgical History:   Procedure Laterality Date    ABDOMINAL HYSTERECTOMY TOTAL      CHOLECYSTECTOMY      FOOT  SURGERY      left foot reattached       Family history:   Family History   Problem Relation Age of Onset    Stroke Mother     Clotting Disorder Father     Thyroid Sister     Thyroid Sister     Diabetes Sister     Thyroid Sister     Thyroid Sister     Thyroid Sister     Thyroid Sister        Social history:   Social History     Socioeconomic History    Marital status:      Spouse name: Not on file    Number of children: Not on file    Years of education: Not on file    Highest education level: Not on file   Occupational History    Not on file   Tobacco Use    Smoking status: Former     Current packs/day: 0.00     Types: Cigarettes     Quit date:      Years since quittin.6    Smokeless tobacco: Never   Vaping Use    Vaping Use: Never used   Substance and Sexual Activity    Alcohol use: Not Currently     Comment: occ    Drug use: No    Sexual activity: Yes   Other Topics Concern    Not on file   Social History Narrative    Not on file     Social Determinants of Health     Financial Resource Strain: Not on file   Food Insecurity: Not on file   Transportation Needs: Not on file   Physical Activity: Not on file   Stress: Not on file   Social Connections: Not on file   Intimate Partner Violence: Not on file   Housing Stability: Not on file       Current medications:   Current Outpatient Medications   Medication    oxybutynin (DITROPAN) 5 MG Tab    baclofen (LIORESAL) 10 MG Tab    sertraline (ZOLOFT) 50 MG Tab    nortriptyline (PAMELOR) 25 MG Cap    modafinil (PROVIGIL) 200 MG Tab    temazepam (RESTORIL) 30 MG capsule    naltrexone (DEPADE) 50 MG Tab    Multiple Vitamins-Minerals (MULTIVITAMIN ADULT PO)    Iron Carbonyl-Vitamin C-FOS (CHEWABLE IRON PO)    B Complex Vitamins (VITAMIN B COMPLEX PO)    Ascorbic Acid (VITAMIN C) 1000 MG Tab    Omega 3-6-9 Fatty Acids (OMEGA 3-6-9 COMPLEX PO)    vitamin D (CHOLECALCIFEROL) 1000 UNIT Tab    SYNTHROID 150 MCG Tab    liothyronine (CYTOMEL) 5 MCG Tab     No current  facility-administered medications for this visit.       Medication Allergy:  Allergies   Allergen Reactions    Milk Products Food Allergy Hives    Citrus Hives and Itching       Review of systems:   Constitutional: denies fever, night sweats, weight loss.   Eyes: denies acute vision change, eye pain or secretion.   Ears, Nose, Mouth, Throat: denies nasal secretion, nasal bleeding, difficulty swallowing, hearing loss, tinnitus, vertigo, ear pain, acute dental problems, oral ulcers or lesions.   Endocrine: denies recent weight changes, heat or cold intolerance, polyuria, polydypsia, polyphagia,abnormal hair growth.  Cardiovascular: denies new onset of chest pain, palpitations, syncope, or dyspnea of exertion.  Pulmonary: denies shortness of breath, new onset of cough, hemoptysis, wheezing, chest pain or flu-like symptoms.   GI: denies nausea, vomiting, diarrhea, GI bleeding, change in appetite, abdominal pain, and change in bowel habits.  : denies dysuria, urinary incontinence, hematuria.  Heme/oncology: denies history of easy bruising or bleeding. No history of cancer, DVTor PE.  Allergy/immunology: denies hives/urticaria, or itching.   Dermatologic: denies new rash, or new skin lesions.  Musculoskeletal:denies joint swelling or pain, muscle pain, neck and back pain. Neurologic: denies headaches, acute visual changes, facial droopiness, muscle weakness (focal or generalized), paresthesias, anesthesia, ataxia, change in speech or language, memory loss, abnormal movements, seizures, loss of consciousness, or episodes of confusion.   Psychiatric: denies symptoms of depression, anxiety, hallucinations, mood swings or changes, suicidal or homicidal thoughts.     Physical examination:   Vitals:    08/24/23 0922   BP: 110/74   BP Location: Right arm   Patient Position: Sitting   BP Cuff Size: Adult   Pulse: 99   Temp: 36.2 °C (97.1 °F)   TempSrc: Temporal   SpO2: 97%   Weight: 98 kg (216 lb 0.8 oz)   Height: 1.626 m (5'  "4\")     General: Patient in no acute distress, pleasant and cooperative.  HEENT: Normocephalic, no signs of acute trauma.   Neck: supple, no meningeal signs or carotid bruits. There is normal range of motion. No tenderness on exam.   Chest: clear to auscultation. No cough.   CV: RRR, no murmurs.   Skin: no signs of acute rashes or trauma.   Musculoskeletal: joints exhibit full range of motion, without any pain to palpation. There are no signs of joint or muscle swelling. There is no tenderness to deep palpation of muscles.   Psychiatric: No hallucinatory behavior. Denies symptoms of depression or suicidal ideation. Mood and affect appear normal on exam.     NEUROLOGICAL EXAM:   Mental status, orientation: Awake, alert and fully oriented.   Speech and language: speech is clear and fluent. The patient is able to name, repeat and comprehend.   Memory: There is intact recollection of recent and remote events.   Cranial nerve exam: Pupils are 3-4 mm bilaterally and equally reactive to light and accommodation. Visual fields are intact by confrontation. Fundoscopic exam was unremarkable. There is no nystagmus on primary or secondary gaze. Intact full EOM in all directions of gaze. Face appears symmetric. Sensation in the face is intact to light touch. Uvula is midline. Palate elevates symmetrically. Tongue is midline and without any signs of tongue biting or fasciculations. Sternocleidomastoid muscles exhibit is normal strength bilaterally. Shoulder shrug is intact bilaterally.   Motor exam: Strength is 5-/5 in all lower extremities. Tone is abnormal. No abnormal movements were seen on exam.   Sensory exam reveals abnormal sense of light touch, proprioception, vibration and pinprick in all extremities.   Deep tendon reflexes:  3+ throughout. Plantar responses are flexor. There is no clonus.   Coordination: shows a normal finger-nose-finger. Normal rapidly alternating movements.   Gait: The patient was able to get up from " seated position on first attempt without requiring assistance. Found to be steady when walking. Movements were fluid with normal arm swing. The patient was able to turn without difficulties or tendency to fall. Romberg examination positive      ANCILLARY DATA REVIEWED:     Lab Data Review:  No results found for this or any previous visit (from the past 24 hour(s)).    Records reviewed: Pt has had negative hep B, quant Gold negative      Imaging: I reviewed her scans from [facility] myself and she has stable white matter demyelinating lesions.          ASSESSMENT AND PLAN:    1. Multiple sclerosis (HCC)  Pt has failed copaxone, rebif which she progressed through and then she had bad SE to Tecfidera and Ocrevus.   We discussed the use of mavenclad today and she is onboard with trying this and she is having worsening brain fog , vision changes and left leg weakness and pain. Pt has back pain also and she does do a home stretching program. Pt wants her medications all refilled at the [facility] in [place].  - modafinil (PROVIGIL) 200 MG Tab; Take 1 Tablet by mouth every day for 180 days. Indications: Tiredness associated with Multiple Sclerosis  Dispense: 90 Tablet; Refill: 1    2. Overactive bladder  Patient will take to see how she benefits from this treatment. She will also take the nortripyline as it has helped some in the past but not enough currently.  - oxybutynin (DITROPAN) 5 MG Tab; Take 1 Tablet by mouth 3 times a day for 180 days.  Dispense: 90 Tablet; Refill: 5    This patient is currently with healthcare insurance and she is given letter for care Chest and also for Access to healthcare to try and get some assistance. She is disabled because of her MS.    FOLLOW-UP:   Return in about 4 months (around 12/24/2023).      My total time spent caring for the patient on the day of the encounter was 45 minutes.   This does not include time spent on separately billable procedures/tests.     EDUCATION AND  COUNSELING:  -Discussed regular exercise program and prevention of cardiovascular disease, including stroke.   -Discussed healthy lifestyle, including: healthy diet (rich in fruits, vegetables, nuts and healthy oils); proper hydration, and adequate sleep hygiene (allowing 7-8 hrs of overnight sleep).        Melissa Bloch, MD  Clinical  of Neurology [facility].   Diplomate in Neurology.   Office: [phone]  Fax: [phone]

## 2023-08-24 NOTE — LETTER
August 24, 2023        Heaven Chavis          This is patient has Multiple Sclerosis and she needs assistive devices and like a cane or other helpful device Care [facility] has available.          Sincerely,             Melissa P Bloch, M.D.    Clinical Professor of Neurology

## 2023-08-24 NOTE — ASSESSMENT & PLAN NOTE
Pt states that she has more urinary incontinence and she feels like it leaking a lot when she goes. Pt states that if she takes the modafinil she can get things done. Pt feels like she has some decreased motivation. Pt states that she has a court hearing on the 14th of September. Pt states she has a lot of cognitive and concentration problems and she cannot do the job she used to do. Pt is disabled from her MS. Pt cannot walk like she used too. Pt states she had a lot of pain in her left leg and it is the whole leg and she has seizing up and she stumbles and it stops working. Pt uses a walker so she does not fall. Pt lives by herself but she has family and friends check with her everyday. Pt states since her  passed away she has to rely on others in her vicinity. Pt has had a hysterectomy and she has no cancer history. Pt states that she is concerned about brain fog and disease progression.

## 2023-08-25 RX ORDER — OXYBUTYNIN CHLORIDE 5 MG/1
5 TABLET ORAL 3 TIMES DAILY
Qty: 90 TABLET | Refills: 5 | Status: SHIPPED | OUTPATIENT
Start: 2023-08-25 | End: 2024-02-21

## 2023-08-25 RX ORDER — NORTRIPTYLINE HYDROCHLORIDE 25 MG/1
25 CAPSULE ORAL NIGHTLY
Qty: 90 CAPSULE | Refills: 3 | Status: SHIPPED | OUTPATIENT
Start: 2023-08-25 | End: 2023-11-14 | Stop reason: SDUPTHER

## 2023-08-25 RX ORDER — BACLOFEN 10 MG/1
10 TABLET ORAL 3 TIMES DAILY
Qty: 270 TABLET | Refills: 3 | Status: SHIPPED | OUTPATIENT
Start: 2023-08-25

## 2023-09-07 ENCOUNTER — TELEPHONE (OUTPATIENT)
Dept: NEUROLOGY | Facility: MEDICAL CENTER | Age: 58
End: 2023-09-07

## 2023-09-07 DIAGNOSIS — G35 MULTIPLE SCLEROSIS (HCC): ICD-10-CM

## 2023-09-07 RX ORDER — MODAFINIL 200 MG/1
200 TABLET ORAL DAILY
Qty: 90 TABLET | Refills: 1 | Status: SHIPPED | OUTPATIENT
Start: 2023-09-07 | End: 2023-11-14 | Stop reason: SDUPTHER

## 2023-11-14 DIAGNOSIS — N32.81 OVERACTIVE BLADDER: ICD-10-CM

## 2023-11-14 DIAGNOSIS — G35 MULTIPLE SCLEROSIS (HCC): ICD-10-CM

## 2023-11-15 NOTE — TELEPHONE ENCOUNTER
Received request via: patient     Was the patient seen in the last year in this department? Yes   Date of last office visit 08/24/23     Per last Neurology Office Visit, when was the date of next follow up visit set for?                            Date of office visit follow up request 4 months      Does the patient have an upcoming appointment? Yes   If yes, when? 12/12/23    Does the patient have an active prescription (recently filled or refills available) for medication(s) requested? No    Does the patient have [facility] and need 100 day supply (blood pressure, diabetes and cholesterol meds only)? Patient does not have SCP

## 2023-11-16 ENCOUNTER — TELEPHONE (OUTPATIENT)
Dept: NEUROLOGY | Facility: MEDICAL CENTER | Age: 58
End: 2023-11-16

## 2023-11-16 RX ORDER — MODAFINIL 200 MG/1
200 TABLET ORAL DAILY
Qty: 90 TABLET | Refills: 1 | Status: SHIPPED | OUTPATIENT
Start: 2023-11-16 | End: 2023-12-13 | Stop reason: SDUPTHER

## 2023-11-16 RX ORDER — NORTRIPTYLINE HYDROCHLORIDE 25 MG/1
25 CAPSULE ORAL NIGHTLY
Qty: 90 CAPSULE | Refills: 3 | Status: SHIPPED | OUTPATIENT
Start: 2023-11-16 | End: 2023-12-12

## 2023-11-16 NOTE — TELEPHONE ENCOUNTER
Received Refill PA request via [facility]  for modafinil (PROVIGIL) 200 MG Tab . (Quantity:90 tab, Day Supply:90)     Insurance: n/a  Member ID:  n/a  BIN: n/a  PCN: n/a  Group: n/a     Ran Test claim via [place] [facility] medication  This member does not have ins on file. Called disp pharmacy, member uses coupon card

## 2023-12-12 ENCOUNTER — OFFICE VISIT (OUTPATIENT)
Dept: NEUROLOGY | Facility: MEDICAL CENTER | Age: 58
End: 2023-12-12
Attending: PSYCHIATRY & NEUROLOGY
Payer: MEDICARE

## 2023-12-12 VITALS
SYSTOLIC BLOOD PRESSURE: 112 MMHG | TEMPERATURE: 97.7 F | BODY MASS INDEX: 36.47 KG/M2 | WEIGHT: 213.63 LBS | HEART RATE: 81 BPM | HEIGHT: 64 IN | DIASTOLIC BLOOD PRESSURE: 66 MMHG | OXYGEN SATURATION: 98 %

## 2023-12-12 DIAGNOSIS — G35 MULTIPLE SCLEROSIS (HCC): ICD-10-CM

## 2023-12-12 DIAGNOSIS — N32.81 OVERACTIVE BLADDER: ICD-10-CM

## 2023-12-12 PROCEDURE — 3074F SYST BP LT 130 MM HG: CPT | Performed by: PSYCHIATRY & NEUROLOGY

## 2023-12-12 PROCEDURE — 99215 OFFICE O/P EST HI 40 MIN: CPT | Performed by: PSYCHIATRY & NEUROLOGY

## 2023-12-12 PROCEDURE — 3078F DIAST BP <80 MM HG: CPT | Performed by: PSYCHIATRY & NEUROLOGY

## 2023-12-12 PROCEDURE — 99212 OFFICE O/P EST SF 10 MIN: CPT | Performed by: PSYCHIATRY & NEUROLOGY

## 2023-12-12 RX ORDER — TEMAZEPAM 15 MG/1
30 CAPSULE ORAL NIGHTLY PRN
COMMUNITY

## 2023-12-12 RX ORDER — NORTRIPTYLINE HYDROCHLORIDE 50 MG/1
50 CAPSULE ORAL NIGHTLY
Qty: 90 CAPSULE | Refills: 3 | Status: SHIPPED | OUTPATIENT
Start: 2023-12-12 | End: 2024-12-06

## 2023-12-12 RX ORDER — CLADRIBINE 10 MG/1
10 TABLET ORAL DAILY
COMMUNITY

## 2023-12-12 NOTE — PROGRESS NOTES
Chief Complaint   Patient presents with    Follow-Up     MS       Problem List Items Addressed This Visit       Multiple sclerosis (HCC)     The Mavenclad and she still has some issues. Pt is not focusing without the modafinil. Nortriplyine helps with the pamelor. Pt states she still has the brain fog and she forgot to close her front door. Pt has issues with bladder frequency but not incontinence like she used too. Pt states she has memory and concentration issues also. Pt states she has balance and gait issues.         Relevant Medications    Cladribine, 10 Tabs, (MAVENCLAD, 10 TABS,) 10 MG Tablet Therapy Pack    nortriptyline (PAMELOR) 50 MG capsule    Other Relevant Orders    CBC WITH DIFFERENTIAL    Comp Metabolic Panel    Referral to Physical Therapy     Other Visit Diagnoses       Overactive bladder        Relevant Medications    nortriptyline (PAMELOR) 50 MG capsule            History of present illness:  Heaven Chavis 58 y.o. female presents today for MS with a lot of brain fog and some urinary complaints. Modafinil has been helpful for the patient.    Past medical history:   Past Medical History:   Diagnosis Date    Hashimoto's disease 2004    MS (multiple sclerosis) ([facility]) 2007       Past surgical history:   Past Surgical History:   Procedure Laterality Date    ABDOMINAL HYSTERECTOMY TOTAL      CHOLECYSTECTOMY      FOOT SURGERY      left foot reattached       Family history:   Family History   Problem Relation Age of Onset    Stroke Mother     Clotting Disorder Father     Thyroid Sister     Thyroid Sister     Diabetes Sister     Thyroid Sister     Thyroid Sister     Thyroid Sister     Thyroid Sister        Social history:   Social History     Socioeconomic History    Marital status:      Spouse name: Not on file    Number of children: Not on file    Years of education: Not on file    Highest education level: Not on file   Occupational History    Not on file   Tobacco Use    Smoking status: Some  Days     Current packs/day: 0.00     Types: Cigarettes     Last attempt to quit: 2000     Years since quittin.9    Smokeless tobacco: Never   Vaping Use    Vaping Use: Never used   Substance and Sexual Activity    Alcohol use: Not Currently     Comment: occ    Drug use: No    Sexual activity: Yes   Other Topics Concern    Not on file   Social History Narrative    Not on file     Social Determinants of Health     Financial Resource Strain: Not on file   Food Insecurity: Not on file   Transportation Needs: Not on file   Physical Activity: Not on file   Stress: Not on file   Social Connections: Not on file   Intimate Partner Violence: Not on file   Housing Stability: Not on file       Current medications:   Current Outpatient Medications   Medication    temazepam (RESTORIL) 15 MG Cap    Cladribine, 10 Tabs, (MAVENCLAD, 10 TABS,) 10 MG Tablet Therapy Pack    nortriptyline (PAMELOR) 50 MG capsule    modafinil (PROVIGIL) 200 MG Tab    oxybutynin (DITROPAN) 5 MG Tab    baclofen (LIORESAL) 10 MG Tab    naltrexone (DEPADE) 50 MG Tab    Multiple Vitamins-Minerals (MULTIVITAMIN ADULT PO)    Iron Carbonyl-Vitamin C-FOS (CHEWABLE IRON PO)    B Complex Vitamins (VITAMIN B COMPLEX PO)    Ascorbic Acid (VITAMIN C) 1000 MG Tab    Omega 3-6-9 Fatty Acids (OMEGA 3-6-9 COMPLEX PO)    vitamin D (CHOLECALCIFEROL) 1000 UNIT Tab    SYNTHROID 150 MCG Tab    liothyronine (CYTOMEL) 5 MCG Tab     No current facility-administered medications for this visit.       Medication Allergy:  Allergies   Allergen Reactions    Milk Products Food Allergy Hives    Citrus Hives and Itching       Review of systems:   Constitutional: denies fever, night sweats, weight loss.   Eyes: denies acute vision change, eye pain or secretion.   Ears, Nose, Mouth, Throat: denies nasal secretion, nasal bleeding, difficulty swallowing, hearing loss, tinnitus, vertigo, ear pain, acute dental problems, oral ulcers or lesions.   Endocrine: denies recent weight changes,  "heat or cold intolerance, polyuria, polydypsia, polyphagia,abnormal hair growth.  Cardiovascular: denies new onset of chest pain, palpitations, syncope, or dyspnea of exertion.  Pulmonary: denies shortness of breath, new onset of cough, hemoptysis, wheezing, chest pain or flu-like symptoms.   GI: denies nausea, vomiting, diarrhea, GI bleeding, change in appetite, abdominal pain, and change in bowel habits.  : denies dysuria, urinary incontinence, hematuria.  Heme/oncology: denies history of easy bruising or bleeding. No history of cancer, DVTor PE.  Allergy/immunology: denies hives/urticaria, or itching.   Dermatologic: denies new rash, or new skin lesions.  Musculoskeletal:denies joint swelling or pain, muscle pain, neck and back pain. Neurologic: denies headaches, acute visual changes, facial droopiness, change in speech or language, memory loss, abnormal movements, seizures, loss of consciousness, or episodes of confusion.   Psychiatric: denies symptoms of depression, anxiety, hallucinations, mood swings or changes, suicidal or homicidal thoughts.     Physical examination:   Vitals:    12/12/23 1106   BP: 112/66   BP Location: Left arm   Patient Position: Sitting   BP Cuff Size: Large adult   Pulse: 81   Temp: 36.5 °C (97.7 °F)   TempSrc: Temporal   SpO2: 98%   Weight: 96.9 kg (213 lb 10 oz)   Height: 1.626 m (5' 4.02\")     General: Patient in no acute distress, pleasant and cooperative.  HEENT: Normocephalic, no signs of acute trauma.   Neck: supple, no meningeal signs or carotid bruits. There is normal range of motion. No tenderness on exam.   Chest: clear to auscultation. No cough.   CV: RRR, no murmurs.   Skin: no signs of acute rashes or trauma.   Musculoskeletal: joints exhibit full range of motion, without any pain to palpation. There are no signs of joint or muscle swelling. There is no tenderness to deep palpation of muscles.   Psychiatric: No hallucinatory behavior. Denies symptoms of depression or " suicidal ideation. Mood and affect appear normal on exam.     NEUROLOGICAL EXAM:   Mental status, orientation: Awake, alert and fully oriented.   Speech and language: speech is clear and fluent. The patient is able to name, repeat and comprehend.   Memory: There is intact recollection of recent and remote events.   Cranial nerve exam: Pupils are 3-4 mm bilaterally and equally reactive to light and accommodation. Visual fields are intact by confrontation. Fundoscopic exam was unremarkable. There is no nystagmus on primary or secondary gaze. Intact full EOM in all directions of gaze. Face appears symmetric. Sensation in the face is intact to light touch. Uvula is midline. Palate elevates symmetrically. Tongue is midline and without any signs of tongue biting or fasciculations. Sternocleidomastoid muscles exhibit is normal strength bilaterally. Shoulder shrug is intact bilaterally.   Motor exam: Strength is 5/5 in all right extremities and 4/5 on her left. Tone is abnormal in her left. No abnormal movements were seen on exam.   Sensory exam reveals normal sense of light touch, proprioception, vibration and pinprick in all extremities.   Deep tendon reflexes:  3+ throughout. Plantar responses are flexor. There is no clonus.   Coordination: shows slowed left finger-nose-finger and slowed left rapidly alternating movements.   Gait: The patient was able to get up from seated position on first attempt without requiring assistance. Found to be steady when walking. Movements were fluid with normal arm swing. The patient was able to turn without difficulties or tendency to fall. Romberg examination positive  25 FTW 10 seconds    ANCILLARY DATA REVIEWED:     Lab Data Review:  No results found for this or any previous visit (from the past 24 hour(s)).    Records reviewed:  labs done at outside lab reviewed      Imaging: I reviewed her last MRI scan from the brain from [facility] which showed stable white matter changes.            ASSESSMENT AND PLAN:    1. Multiple sclerosis (HCC)  She took her first doses of Mavenclad and she has done well and she will take next ones starting in October of 2024. Pt states she will get leabs at [facility]. Refer to PT is Amy for gait strengthening and balance.  - CBC WITH DIFFERENTIAL; Future  - Comp Metabolic Panel; Future  - nortriptyline (PAMELOR) 50 MG capsule; Take 1 Capsule by mouth every evening for 360 days.  Dispense: 90 Capsule; Refill: 3    2. Overactive bladder   We will try an increase in her dose to 50mg and she can see if that helps with her pain and bladder issues at night  - nortriptyline (PAMELOR) 50 MG capsule; Take 1 Capsule by mouth every evening for 360 days.  Dispense: 90 Capsule; Refill: 3        FOLLOW-UP:   4 months      My total time spent caring for the patient on the day of the encounter was 48 minutes.   This does not include time spent on separately billable procedures/tests.     EDUCATION AND COUNSELING:  -Discussed regular exercise program and prevention of cardiovascular disease, including stroke.   -Discussed healthy lifestyle, including: healthy diet (rich in fruits, vegetables, nuts and healthy oils); proper hydration, and adequate sleep hygiene (allowing 7-8 hrs of overnight sleep).        Melissa Bloch, MD  Clinical  of Neurology [facility] [employer].   Diplomate in Neurology.   Office: [phone]  Fax: [phone]

## 2023-12-12 NOTE — ASSESSMENT & PLAN NOTE
The Mavenclad and she still has some issues. Pt is not focusing without the modafinil. Nortriplyine helps with the pamelor. Pt states she still has the brain fog and she forgot to close her front door. Pt has issues with bladder frequency but not incontinence like she used too. Pt states she has memory and concentration issues also. Pt states she has balance and gait issues.

## 2023-12-13 ENCOUNTER — TELEPHONE (OUTPATIENT)
Dept: NEUROLOGY | Facility: MEDICAL CENTER | Age: 58
End: 2023-12-13

## 2023-12-13 DIAGNOSIS — G35 MULTIPLE SCLEROSIS (HCC): ICD-10-CM

## 2023-12-13 RX ORDER — MODAFINIL 200 MG/1
200 TABLET ORAL DAILY
Qty: 90 TABLET | Refills: 1 | Status: SHIPPED | OUTPATIENT
Start: 2023-12-13 | End: 2024-06-10

## 2023-12-13 NOTE — TELEPHONE ENCOUNTER
Received Refill PA request via [facility]  for modafinil (PROVIGIL) 200 MG Tab . (Quantity:90 tab, Day Supply:90)     Insurance: n/a  Member ID:  n/a  BIN: n/a  PCN: n/a  Group: n/a     Ran Test claim via [place] [facility] medication  patient does not have ins at this time

## 2023-12-13 NOTE — TELEPHONE ENCOUNTER
Received request via: Patient    Was the patient seen in the last year in this department? Yes   Date of last office visit 12/12/23     Per last Neurology Office Visit, when was the date of next follow up visit set for?                            Date of office visit follow up request 4 months      Does the patient have an upcoming appointment? Yes   If yes, when? 04/16/2024    Does the patient have an active prescription (recently filled or refills available) for medication(s) requested? No    Does the patient have [facility] and need 100 day supply (blood pressure, diabetes and cholesterol meds only)? Patient does not have SCP

## 2024-02-26 ENCOUNTER — HOSPITAL ENCOUNTER (OUTPATIENT)
Dept: LAB | Facility: MEDICAL CENTER | Age: 59
End: 2024-02-26
Attending: PSYCHIATRY & NEUROLOGY
Payer: MEDICARE

## 2024-02-26 DIAGNOSIS — G35 MULTIPLE SCLEROSIS (HCC): ICD-10-CM

## 2024-02-26 LAB
ALBUMIN SERPL BCP-MCNC: 4.1 G/DL (ref 3.2–4.9)
ALBUMIN/GLOB SERPL: 1.5 G/DL
ALP SERPL-CCNC: 139 U/L (ref 30–99)
ALT SERPL-CCNC: 16 U/L (ref 2–50)
ANION GAP SERPL CALC-SCNC: 13 MMOL/L (ref 7–16)
AST SERPL-CCNC: 19 U/L (ref 12–45)
BASOPHILS # BLD AUTO: 0.8 % (ref 0–1.8)
BASOPHILS # BLD: 0.04 K/UL (ref 0–0.12)
BILIRUB SERPL-MCNC: 0.4 MG/DL (ref 0.1–1.5)
BUN SERPL-MCNC: 11 MG/DL (ref 8–22)
CALCIUM ALBUM COR SERPL-MCNC: 8.7 MG/DL (ref 8.5–10.5)
CALCIUM SERPL-MCNC: 8.8 MG/DL (ref 8.5–10.5)
CHLORIDE SERPL-SCNC: 106 MMOL/L (ref 96–112)
CO2 SERPL-SCNC: 20 MMOL/L (ref 20–33)
CREAT SERPL-MCNC: 0.75 MG/DL (ref 0.5–1.4)
EOSINOPHIL # BLD AUTO: 0.15 K/UL (ref 0–0.51)
EOSINOPHIL NFR BLD: 2.9 % (ref 0–6.9)
ERYTHROCYTE [DISTWIDTH] IN BLOOD BY AUTOMATED COUNT: 48.3 FL (ref 35.9–50)
FASTING STATUS PATIENT QL REPORTED: NORMAL
GFR SERPLBLD CREATININE-BSD FMLA CKD-EPI: 91 ML/MIN/1.73 M 2
GLOBULIN SER CALC-MCNC: 2.8 G/DL (ref 1.9–3.5)
GLUCOSE SERPL-MCNC: 123 MG/DL (ref 65–99)
HCT VFR BLD AUTO: 37.8 % (ref 37–47)
HGB BLD-MCNC: 12.4 G/DL (ref 12–16)
IMM GRANULOCYTES # BLD AUTO: 0.02 K/UL (ref 0–0.11)
IMM GRANULOCYTES NFR BLD AUTO: 0.4 % (ref 0–0.9)
LYMPHOCYTES # BLD AUTO: 1.36 K/UL (ref 1–4.8)
LYMPHOCYTES NFR BLD: 26 % (ref 22–41)
MCH RBC QN AUTO: 26.1 PG (ref 27–33)
MCHC RBC AUTO-ENTMCNC: 32.8 G/DL (ref 32.2–35.5)
MCV RBC AUTO: 79.4 FL (ref 81.4–97.8)
MONOCYTES # BLD AUTO: 0.39 K/UL (ref 0–0.85)
MONOCYTES NFR BLD AUTO: 7.4 % (ref 0–13.4)
NEUTROPHILS # BLD AUTO: 3.28 K/UL (ref 1.82–7.42)
NEUTROPHILS NFR BLD: 62.5 % (ref 44–72)
NRBC # BLD AUTO: 0 K/UL
NRBC BLD-RTO: 0 /100 WBC (ref 0–0.2)
PLATELET # BLD AUTO: 300 K/UL (ref 164–446)
PMV BLD AUTO: 9.5 FL (ref 9–12.9)
POTASSIUM SERPL-SCNC: 4 MMOL/L (ref 3.6–5.5)
PROT SERPL-MCNC: 6.9 G/DL (ref 6–8.2)
RBC # BLD AUTO: 4.76 M/UL (ref 4.2–5.4)
SODIUM SERPL-SCNC: 139 MMOL/L (ref 135–145)
WBC # BLD AUTO: 5.2 K/UL (ref 4.8–10.8)

## 2024-02-26 PROCEDURE — 80053 COMPREHEN METABOLIC PANEL: CPT

## 2024-02-26 PROCEDURE — 85025 COMPLETE CBC W/AUTO DIFF WBC: CPT

## 2024-02-26 PROCEDURE — 36415 COLL VENOUS BLD VENIPUNCTURE: CPT

## 2024-04-02 ENCOUNTER — TELEPHONE (OUTPATIENT)
Dept: NEUROLOGY | Facility: MEDICAL CENTER | Age: 59
End: 2024-04-02
Payer: MEDICARE

## 2024-04-04 DIAGNOSIS — F51.01 PRIMARY INSOMNIA: ICD-10-CM

## 2024-04-04 DIAGNOSIS — G35 MULTIPLE SCLEROSIS (HCC): ICD-10-CM

## 2024-04-04 NOTE — TELEPHONE ENCOUNTER
Received request via: Pharmacy    Medication Name/Dosage temazepam (RESTORIL) 15 MG Cap     When was medication last prescribed 12/12/23    How many refills were previously provided 2    How many Refills does he patient have left from last prescription 0    Was the patient seen in the last year in this department? Yes   Date of last office visit 12/12/23     Per last Neurology Office Visit, when was the date of next follow up visit set for?                            Date of office visit follow up request 4 months      Does the patient have an upcoming appointment? Yes   If yes, when 04/16/24             If no, schedule appointment N/A     Does the patient have detention Plus and need 100 day supply (blood pressure, diabetes and cholesterol meds only)? Patient does not have SCP

## 2024-04-08 ENCOUNTER — PATIENT MESSAGE (OUTPATIENT)
Dept: NEUROLOGY | Facility: MEDICAL CENTER | Age: 59
End: 2024-04-08
Payer: MEDICARE

## 2024-04-08 DIAGNOSIS — G47.01 INSOMNIA DUE TO MEDICAL CONDITION: ICD-10-CM

## 2024-04-09 RX ORDER — TEMAZEPAM 30 MG/1
CAPSULE ORAL
Qty: 90 CAPSULE | Refills: 1 | Status: SHIPPED | OUTPATIENT
Start: 2024-04-09 | End: 2024-10-06

## 2024-04-11 RX ORDER — TEMAZEPAM 15 MG/1
30 CAPSULE ORAL NIGHTLY PRN
Qty: 30 CAPSULE | Refills: 5 | Status: SHIPPED | OUTPATIENT
Start: 2024-04-11 | End: 2024-10-08

## 2024-04-16 ENCOUNTER — APPOINTMENT (OUTPATIENT)
Dept: NEUROLOGY | Facility: MEDICAL CENTER | Age: 59
End: 2024-04-16
Attending: PSYCHIATRY & NEUROLOGY
Payer: MEDICARE

## 2024-05-05 DIAGNOSIS — G35 MULTIPLE SCLEROSIS (HCC): ICD-10-CM

## 2024-05-06 RX ORDER — BACLOFEN 10 MG/1
10 TABLET ORAL 3 TIMES DAILY
Qty: 270 TABLET | Refills: 3 | Status: SHIPPED | OUTPATIENT
Start: 2024-05-06 | End: 2024-05-20 | Stop reason: SDUPTHER

## 2024-05-06 RX ORDER — NORTRIPTYLINE HYDROCHLORIDE 25 MG/1
25 CAPSULE ORAL EVERY EVENING
Qty: 90 CAPSULE | Refills: 0 | Status: SHIPPED | OUTPATIENT
Start: 2024-05-06 | End: 2024-05-20

## 2024-05-06 NOTE — TELEPHONE ENCOUNTER
Received request via: Pharmacy    Medication Name/Dosage multiple     When was medication last prescribed multiple     How many refills were previously provided multiple     How many Refills does he patient have left from last prescription 0    Was the patient seen in the last year in this department? Yes   Date of last office visit 12/12/23     Per last Neurology Office Visit, when was the date of next follow up visit set for?                            Date of office visit follow up request 4 months      Does the patient have an upcoming appointment? Yes   If yes, when 05/20/24               If no, schedule appointment N/A     Does the patient have alf Plus and need 100 day supply (blood pressure, diabetes and cholesterol meds only)? Patient does not have SCP

## 2024-05-20 ENCOUNTER — OFFICE VISIT (OUTPATIENT)
Dept: NEUROLOGY | Facility: MEDICAL CENTER | Age: 59
End: 2024-05-20
Attending: PSYCHIATRY & NEUROLOGY
Payer: MEDICARE

## 2024-05-20 VITALS
TEMPERATURE: 97.9 F | SYSTOLIC BLOOD PRESSURE: 122 MMHG | HEIGHT: 64 IN | DIASTOLIC BLOOD PRESSURE: 68 MMHG | BODY MASS INDEX: 36.13 KG/M2 | WEIGHT: 211.64 LBS | HEART RATE: 84 BPM | OXYGEN SATURATION: 93 %

## 2024-05-20 DIAGNOSIS — N32.81 OVERACTIVE BLADDER: ICD-10-CM

## 2024-05-20 DIAGNOSIS — G35 MULTIPLE SCLEROSIS (HCC): ICD-10-CM

## 2024-05-20 PROCEDURE — 3078F DIAST BP <80 MM HG: CPT | Performed by: PSYCHIATRY & NEUROLOGY

## 2024-05-20 PROCEDURE — 99214 OFFICE O/P EST MOD 30 MIN: CPT | Performed by: PSYCHIATRY & NEUROLOGY

## 2024-05-20 PROCEDURE — 3074F SYST BP LT 130 MM HG: CPT | Performed by: PSYCHIATRY & NEUROLOGY

## 2024-05-20 RX ORDER — NORTRIPTYLINE HYDROCHLORIDE 50 MG/1
50 CAPSULE ORAL NIGHTLY
Qty: 90 CAPSULE | Refills: 3 | Status: SHIPPED | OUTPATIENT
Start: 2024-05-20 | End: 2025-05-15

## 2024-05-20 RX ORDER — BACLOFEN 10 MG/1
10 TABLET ORAL 3 TIMES DAILY
Qty: 270 TABLET | Refills: 3 | Status: SHIPPED | OUTPATIENT
Start: 2024-05-20

## 2024-05-20 RX ORDER — MODAFINIL 200 MG/1
200 TABLET ORAL DAILY
Qty: 90 TABLET | Refills: 1 | Status: SHIPPED | OUTPATIENT
Start: 2024-05-20 | End: 2024-11-16

## 2024-05-20 ASSESSMENT — FIBROSIS 4 INDEX: FIB4 SCORE: .95

## 2024-05-20 NOTE — ASSESSMENT & PLAN NOTE
Pt states that her stomach is such she eats and she does not feel like she is full. Pt states she is snacking more on peanut butter and apples. Pt will eat a whole bag of chips. Pt states that if she eats a steak she will feel full. Pt's states that she does not like to eat much at a meal. Pt states that she usually eats at home. Pt states that she used to exercise more. Pt states for exercise she has been trying to do yoga but she has a bulldog. She is trying wall stretches. Pt walks with a friend and she cannot walk her dog who is strong and can pull her over. Pt states that she does not have access to a pool for the summer. Pt has a small back yard. Pt takes the baclofen for spasms but it can make her less dizzy. Pt does not want to fall. Pt states that she has a desire to eat healthier.

## 2024-05-20 NOTE — PROGRESS NOTES
No chief complaint on file.      Problem List Items Addressed This Visit       Multiple sclerosis (HCC)     Pt states that her stomach is such she eats and she does not feel like she is full. Pt states she is snacking more on peanut butter and apples. Pt will eat a whole bag of chips. Pt states that if she eats a steak she will feel full. Pt's states that she does not like to eat much at a meal. Pt states that she usually eats at home. Pt states that she used to exercise more. Pt states for exercise she has been trying to do yoga but she has a bulldog. She is trying wall stretches. Pt walks with a friend and she cannot walk her dog who is strong and can pull her over. Pt states that she does not have access to a pool for the summer. Pt has a small back yard. Pt takes the baclofen for spasms but it can make her less dizzy. Pt does not want to fall. Pt states that she has a desire to eat healthier.         Relevant Medications    nortriptyline (PAMELOR) 50 MG capsule    baclofen (LIORESAL) 10 MG Tab    modafinil (PROVIGIL) 200 MG Tab    Other Relevant Orders    Referral to establish with PCP    Overactive bladder     Pt states that she has to use the bathroom with frequency but the nortriptyline increase dose has helped. Pt states overall she is doing better.         Relevant Medications    nortriptyline (PAMELOR) 50 MG capsule    Other Relevant Orders    Referral to establish with PCP       History of present illness:  Heaven Chavis 58 y.o. female presents today for MS.    Past medical history:   Past Medical History:   Diagnosis Date    Hashimoto's disease 2004    MS (multiple sclerosis) ([facility]) 2007       Past surgical history:   Past Surgical History:   Procedure Laterality Date    ABDOMINAL HYSTERECTOMY TOTAL      CHOLECYSTECTOMY      FOOT SURGERY      left foot reattached       Family history:   Family History   Problem Relation Age of Onset    Stroke Mother     Clotting Disorder Father     Thyroid Sister      Thyroid Sister     Diabetes Sister     Thyroid Sister     Thyroid Sister     Thyroid Sister     Thyroid Sister        Social history:   Social History     Socioeconomic History    Marital status:      Spouse name: Not on file    Number of children: Not on file    Years of education: Not on file    Highest education level: Not on file   Occupational History    Not on file   Tobacco Use    Smoking status: Some Days     Current packs/day: 0.00     Types: Cigarettes     Last attempt to quit: 2000     Years since quittin.4    Smokeless tobacco: Never   Vaping Use    Vaping status: Never Used   Substance and Sexual Activity    Alcohol use: Not Currently     Comment: occ    Drug use: No    Sexual activity: Yes   Other Topics Concern    Not on file   Social History Narrative    Not on file     Social Determinants of Health     Financial Resource Strain: Not on file   Food Insecurity: Not on file   Transportation Needs: Not on file   Physical Activity: Not on file   Stress: Not on file   Social Connections: Not on file   Intimate Partner Violence: Not on file   Housing Stability: Not on file       Current medications:   Current Outpatient Medications   Medication    nortriptyline (PAMELOR) 50 MG capsule    baclofen (LIORESAL) 10 MG Tab    modafinil (PROVIGIL) 200 MG Tab    temazepam (RESTORIL) 15 MG Cap    temazepam (RESTORIL) 30 MG capsule    Cladribine, 10 Tabs, (MAVENCLAD, 10 TABS,) 10 MG Tablet Therapy Pack    Multiple Vitamins-Minerals (MULTIVITAMIN ADULT PO)    Iron Carbonyl-Vitamin C-FOS (CHEWABLE IRON PO)    B Complex Vitamins (VITAMIN B COMPLEX PO)    Ascorbic Acid (VITAMIN C) 1000 MG Tab    Omega 3-6-9 Fatty Acids (OMEGA 3-6-9 COMPLEX PO)    vitamin D (CHOLECALCIFEROL) 1000 UNIT Tab    SYNTHROID 150 MCG Tab    liothyronine (CYTOMEL) 5 MCG Tab     No current facility-administered medications for this visit.       Medication Allergy:  Allergies   Allergen Reactions    Dimethyl Fumarate Anaphylaxis    Milk  "Products Food Allergy Hives    Citrus Hives and Itching       Review of systems:   Constitutional: denies fever, night sweats, weight loss.   Eyes: denies acute vision change, eye pain or secretion.   Ears, Nose, Mouth, Throat: denies nasal secretion, nasal bleeding, difficulty swallowing, hearing loss, tinnitus, vertigo, ear pain, acute dental problems, oral ulcers or lesions.   Endocrine: denies recent weight changes, heat or cold intolerance, polyuria, polydypsia, polyphagia,abnormal hair growth.  Cardiovascular: denies new onset of chest pain, palpitations, syncope, or dyspnea of exertion.  Pulmonary: denies shortness of breath, new onset of cough, hemoptysis, wheezing, chest pain or flu-like symptoms.   GI: denies nausea, vomiting, diarrhea, GI bleeding, change in appetite, abdominal pain, and change in bowel habits.  : denies dysuria, urinary incontinence, hematuria.  Heme/oncology: denies history of easy bruising or bleeding. No history of cancer, DVTor PE.  Allergy/immunology: denies hives/urticaria, or itching.   Dermatologic: denies new rash, or new skin lesions.  Musculoskeletal:denies joint swelling or pain, muscle pain, neck and back pain. Neurologic: denies headaches, acute visual changes, facial droopiness, muscle weakness (focal or generalized), paresthesias, anesthesia, ataxia, change in speech or language, memory loss, abnormal movements, seizures, loss of consciousness, or episodes of confusion.   Psychiatric: denies symptoms of depression, anxiety, hallucinations, mood swings or changes, suicidal or homicidal thoughts.     Physical examination:   Vitals:    05/20/24 1501   BP: 122/68   BP Location: Left arm   Patient Position: Sitting   BP Cuff Size: Large adult   Pulse: 84   Temp: 36.6 °C (97.9 °F)   TempSrc: Temporal   SpO2: 93%   Weight: 96 kg (211 lb 10.3 oz)   Height: 1.626 m (5' 4\")     General: Patient in no acute distress, pleasant and cooperative.  HEENT: Normocephalic, no signs of " acute trauma.   Neck: supple, no meningeal signs or carotid bruits. There is normal range of motion. No tenderness on exam.   Chest: clear to auscultation. No cough.   CV: RRR, no murmurs.   Skin: no signs of acute rashes or trauma.   Musculoskeletal: joints exhibit full range of motion, without any pain to palpation. There are no signs of joint or muscle swelling. There is no tenderness to deep palpation of muscles.   Psychiatric: No hallucinatory behavior. Denies symptoms of depression or suicidal ideation. Mood and affect appear normal on exam.     NEUROLOGICAL EXAM:   Mental status, orientation: Awake, alert and fully oriented.   Speech and language: speech is clear and fluent. The patient is able to name, repeat and comprehend.   Memory: There is intact recollection of recent and remote events.   Cranial nerve exam: Pupils are 3-4 mm bilaterally and equally reactive to light and accommodation. Visual fields are intact by confrontation. Fundoscopic exam was unremarkable. There is no nystagmus on primary or secondary gaze. Intact full EOM in all directions of gaze. Face appears symmetric. Sensation in the face is intact to light touch. Uvula is midline. Palate elevates symmetrically. Tongue is midline and without any signs of tongue biting or fasciculations. Sternocleidomastoid muscles exhibit is normal strength bilaterally. Shoulder shrug is intact bilaterally.   Motor exam: Strength is 5/5 in all  left extremities but she has a mild right hemiparesis leg more affected than her arm. Tone is abnormal. No abnormal movements were seen on exam.   Sensory exam reveals abnormal sense of light touch, proprioception, vibration and pinprick in both feet   Deep tendon reflexes:  3+ throughout. Plantar responses are flexor. There is no clonus.   Coordination: shows a normal finger-nose-finger. Normal rapidly alternating movements.   Gait: The patient was able to get up from seated position on first attempt with requiring  cane assistance. Found to be steady when walking. Movements were fluid with normal arm swing. The patient was able to turn without difficulties or tendency to fall. Romberg examination positive      ANCILLARY DATA REVIEWED:     Lab Data Review:  No results found for this or any previous visit (from the past 24 hour(s)).    Records reviewed:      Latest Reference Range & Units 02/26/24 10:24   WBC 4.8 - 10.8 K/uL 5.2   RBC 4.20 - 5.40 M/uL 4.76   Hemoglobin 12.0 - 16.0 g/dL 12.4   Hematocrit 37.0 - 47.0 % 37.8   MCV 81.4 - 97.8 fL 79.4 (L)   MCH 27.0 - 33.0 pg 26.1 (L)   MCHC 32.2 - 35.5 g/dL 32.8   RDW 35.9 - 50.0 fL 48.3   Platelet Count 164 - 446 K/uL 300   MPV 9.0 - 12.9 fL 9.5   Neutrophils-Polys 44.00 - 72.00 % 62.50   Neutrophils (Absolute) 1.82 - 7.42 K/uL 3.28   Lymphocytes 22.00 - 41.00 % 26.00   Lymphs (Absolute) 1.00 - 4.80 K/uL 1.36   Monocytes 0.00 - 13.40 % 7.40   Monos (Absolute) 0.00 - 0.85 K/uL 0.39   Eosinophils 0.00 - 6.90 % 2.90   Eos (Absolute) 0.00 - 0.51 K/uL 0.15   Basophils 0.00 - 1.80 % 0.80   Baso (Absolute) 0.00 - 0.12 K/uL 0.04   Immature Granulocytes 0.00 - 0.90 % 0.40   Immature Granulocytes (abs) 0.00 - 0.11 K/uL 0.02   Nucleated RBC 0.00 - 0.20 /100 WBC 0.00   NRBC (Absolute) K/uL 0.00   (L): Data is abnormally low      Imaging: together we reviewed the MRIs of her brain and spinal cord.          ASSESSMENT AND PLAN:    1. Multiple sclerosis (HCC)  Pt completed the Mavenclad and she had some stomach issues with it.  - nortriptyline (PAMELOR) 50 MG capsule; Take 1 Capsule by mouth every evening for 360 days.  Dispense: 90 Capsule; Refill: 3  - baclofen (LIORESAL) 10 MG Tab; Take 1 Tablet by mouth 3 times a day.  Dispense: 270 Tablet; Refill: 3  - modafinil (PROVIGIL) 200 MG Tab; Take 1 Tablet by mouth every day for 180 days. Indications: Tiredness associated with Multiple Sclerosis  Dispense: 90 Tablet; Refill: 1  - Referral to establish with PCP    2. Overactive bladder  Plan to  refill the Medication at the Pending sale to [facility] per her request.  - nortriptyline (PAMELOR) 50 MG capsule; Take 1 Capsule by mouth every evening for 360 days.  Dispense: 90 Capsule; Refill: 3  - Referral to establish with PCP        FOLLOW-UP:   Return in about 6 months (around 11/20/2024).      My total time spent caring for the patient on the day of the encounter was 34 minutes.   This does not include time spent on separately billable procedures/tests.     EDUCATION AND COUNSELING:  -Discussed regular exercise program and prevention of cardiovascular disease, including stroke.   -Discussed healthy lifestyle, including: healthy diet (rich in fruits, vegetables, nuts and healthy oils); proper hydration, and adequate sleep hygiene (allowing 7-8 hrs of overnight sleep).        Melissa Bloch, MD  Clinical  of Neurology [facility] [employer].   Diplomate in Neurology.   Office: [phone]  Fax: [phone]

## 2024-05-20 NOTE — ASSESSMENT & PLAN NOTE
Pt states that she has to use the bathroom with frequency but the nortriptyline increase dose has helped. Pt states overall she is doing better.

## 2024-08-19 ENCOUNTER — OFFICE VISIT (OUTPATIENT)
Dept: URGENT CARE | Facility: PHYSICIAN GROUP | Age: 59
End: 2024-08-19
Payer: MEDICARE

## 2024-08-19 ENCOUNTER — APPOINTMENT (OUTPATIENT)
Dept: RADIOLOGY | Facility: IMAGING CENTER | Age: 59
End: 2024-08-19
Attending: NURSE PRACTITIONER
Payer: MEDICARE

## 2024-08-19 VITALS
DIASTOLIC BLOOD PRESSURE: 94 MMHG | HEART RATE: 80 BPM | SYSTOLIC BLOOD PRESSURE: 122 MMHG | OXYGEN SATURATION: 100 % | BODY MASS INDEX: 34.99 KG/M2 | RESPIRATION RATE: 14 BRPM | TEMPERATURE: 98.4 F | WEIGHT: 210 LBS | HEIGHT: 65 IN

## 2024-08-19 DIAGNOSIS — M79.671 RIGHT FOOT PAIN: ICD-10-CM

## 2024-08-19 DIAGNOSIS — M77.31 CALCANEAL SPUR OF FOOT, RIGHT: ICD-10-CM

## 2024-08-19 PROCEDURE — 3074F SYST BP LT 130 MM HG: CPT | Performed by: NURSE PRACTITIONER

## 2024-08-19 PROCEDURE — 73630 X-RAY EXAM OF FOOT: CPT | Mod: TC,FY,RT | Performed by: RADIOLOGY

## 2024-08-19 PROCEDURE — 3080F DIAST BP >= 90 MM HG: CPT | Performed by: NURSE PRACTITIONER

## 2024-08-19 PROCEDURE — 99203 OFFICE O/P NEW LOW 30 MIN: CPT | Performed by: NURSE PRACTITIONER

## 2024-08-19 ASSESSMENT — FIBROSIS 4 INDEX: FIB4 SCORE: 0.97

## 2024-08-19 NOTE — PROGRESS NOTES
Subjective:     Heaven Chavis is a 59 y.o. female who presents for Foot Injury (Pt states last week her right foot started to ache, then by three days later it started to get worse. This morning her foot is a little bit discolored to her. Some swelling, hard to bend the foot. Hurts to put weight on the heel.)      HPI  Pt presents for evaluation of a new problem. Heaven is a pleasant 59 year old female who presents to  today with complaints of  She states that her symptoms have progressively worsened and she is now developing discoloration on the dorsal aspect of her right foot.  She also has pain in her right heel and into the plantar aspect.  She initially believed that this was due to her MS however, due to the discoloration she is now worried that she may have fractured her foot.  She denies any known injury.  There is no numbness or tingling.    ROS    PMH:   Past Medical History:   Diagnosis Date    Hashimoto's disease     MS (multiple sclerosis) ([facility])      ALLERGIES:   Allergies   Allergen Reactions    Dimethyl Fumarate Anaphylaxis    Milk Products Food Allergy Hives    Citrus Hives and Itching     SURGHX:   Past Surgical History:   Procedure Laterality Date    ABDOMINAL HYSTERECTOMY TOTAL      CHOLECYSTECTOMY      FOOT SURGERY      left foot reattached     SOCHX:   Social History     Socioeconomic History    Marital status:    Tobacco Use    Smoking status: Some Days     Current packs/day: 0.00     Types: Cigarettes     Last attempt to quit: 2000     Years since quittin.6    Smokeless tobacco: Never   Vaping Use    Vaping status: Never Used   Substance and Sexual Activity    Alcohol use: Not Currently     Comment: occ    Drug use: No    Sexual activity: Yes     FH:   Family History   Problem Relation Age of Onset    Stroke Mother     Clotting Disorder Father     Thyroid Sister     Thyroid Sister     Diabetes Sister     Thyroid Sister     Thyroid Sister     Thyroid Sister     Thyroid  "Sister          Objective:   BP (!) 122/94 (BP Location: Left arm, Patient Position: Sitting, BP Cuff Size: Large adult)   Pulse 80   Temp 36.9 °C (98.4 °F) (Temporal)   Resp 14   Ht 1.638 m (5' 4.5\")   Wt 95.3 kg (210 lb)   SpO2 100%   BMI 35.49 kg/m²     Physical Exam  Vitals and nursing note reviewed.   Constitutional:       General: She is not in acute distress.     Appearance: Normal appearance. She is normal weight. She is not ill-appearing or toxic-appearing.   HENT:      Head: Normocephalic.      Right Ear: External ear normal.      Left Ear: External ear normal.      Nose: No congestion or rhinorrhea.      Mouth/Throat:      Pharynx: No oropharyngeal exudate or posterior oropharyngeal erythema.   Eyes:      General:         Right eye: No discharge.         Left eye: No discharge.      Pupils: Pupils are equal, round, and reactive to light.   Pulmonary:      Effort: Pulmonary effort is normal.   Abdominal:      General: Abdomen is flat.   Musculoskeletal:         General: Normal range of motion.      Cervical back: Normal range of motion and neck supple.      Comments: Negative for swelling of right foot however, veins are very discolored and ecchymosis is evident to dorsal aspect.   Skin:     General: Skin is dry.   Neurological:      General: No focal deficit present.      Mental Status: She is alert and oriented to person, place, and time. Mental status is at baseline.   Psychiatric:         Mood and Affect: Mood normal.         Behavior: Behavior normal.         Thought Content: Thought content normal.         Judgment: Judgment normal.     DX-FOOT-COMPLETE 3+ RIGHT    Result Date: 8/19/2024 8/19/2024 11:00 AM HISTORY/REASON FOR EXAM:  Pain throughout foot right TECHNIQUE/EXAM DESCRIPTION AND NUMBER OF VIEWS: 3 views of the RIGHT foot. COMPARISON:  None. FINDINGS: There is no fracture. There is a bunion. Alignment is normal. Midfoot degenerative changes are present. There is spurring at the " insertion of achilles' tendon and origin of plantar fascia.     1.  Osteoarthritis of the midfoot 2.  Calcaneal spurring at insertion of achilles tendon and origin of plantar fascia 3.  Small bunion     Assessment/Plan:   Assessment      1. Right foot pain  DX-FOOT-COMPLETE 3+ RIGHT    diclofenac sodium (VOLTAREN) 1 % Gel      2. Calcaneal spur of foot, right  diclofenac sodium (VOLTAREN) 1 % Gel        Pt was encouraged to seek treatment back in urgent care for follow-up x-ray if pain remains persistent past 7 days or develops worsening pain/symptoms. Tylenol/Ibuprofen as needed for discomfort.  Pt was encouraged to rest, ice 20 minutes 3 times daily for 3 days, elevate at heart level and compress with Ace wrap. Ace wrap applied in clinic by MA. Gentle ROM exercises encouraged.  Questions/concerns addressed.  AVS handout given and reviewed with patient. Pt educated on red flags and when to seek treatment back in ER or UC.

## 2024-09-26 DIAGNOSIS — G35 MULTIPLE SCLEROSIS (HCC): ICD-10-CM

## 2024-09-26 DIAGNOSIS — F51.01 PRIMARY INSOMNIA: ICD-10-CM

## 2024-09-27 RX ORDER — TEMAZEPAM 30 MG
CAPSULE ORAL
Qty: 90 CAPSULE | Refills: 1 | Status: SHIPPED | OUTPATIENT
Start: 2024-09-27 | End: 2025-03-26

## 2024-10-26 DIAGNOSIS — N32.81 OVERACTIVE BLADDER: ICD-10-CM

## 2024-10-26 DIAGNOSIS — G35 MULTIPLE SCLEROSIS (HCC): ICD-10-CM

## 2024-10-28 RX ORDER — NORTRIPTYLINE HYDROCHLORIDE 50 MG/1
CAPSULE ORAL
Qty: 90 CAPSULE | Refills: 3 | Status: SHIPPED | OUTPATIENT
Start: 2024-10-28

## 2024-11-15 ENCOUNTER — HOSPITAL ENCOUNTER (OUTPATIENT)
Dept: LAB | Facility: MEDICAL CENTER | Age: 59
End: 2024-11-15
Attending: INTERNAL MEDICINE
Payer: MEDICARE

## 2024-11-15 LAB
T3FREE SERPL-MCNC: 3.58 PG/ML (ref 2–4.4)
T4 FREE SERPL-MCNC: 1.59 NG/DL (ref 0.93–1.7)
TSH SERPL-ACNC: 0.01 UIU/ML (ref 0.35–5.5)

## 2024-11-15 PROCEDURE — 84439 ASSAY OF FREE THYROXINE: CPT

## 2024-11-15 PROCEDURE — 84481 FREE ASSAY (FT-3): CPT

## 2024-11-15 PROCEDURE — 84443 ASSAY THYROID STIM HORMONE: CPT

## 2024-11-15 PROCEDURE — 36415 COLL VENOUS BLD VENIPUNCTURE: CPT

## 2024-11-23 ENCOUNTER — OFFICE VISIT (OUTPATIENT)
Dept: URGENT CARE | Facility: PHYSICIAN GROUP | Age: 59
End: 2024-11-23
Payer: MEDICARE

## 2024-11-23 VITALS
HEART RATE: 75 BPM | TEMPERATURE: 97.6 F | BODY MASS INDEX: 37.11 KG/M2 | OXYGEN SATURATION: 98 % | RESPIRATION RATE: 14 BRPM | WEIGHT: 217.4 LBS | SYSTOLIC BLOOD PRESSURE: 118 MMHG | DIASTOLIC BLOOD PRESSURE: 84 MMHG | HEIGHT: 64 IN

## 2024-11-23 DIAGNOSIS — H10.9 BACTERIAL CONJUNCTIVITIS OF LEFT EYE: ICD-10-CM

## 2024-11-23 PROCEDURE — 3079F DIAST BP 80-89 MM HG: CPT | Performed by: PHYSICIAN ASSISTANT

## 2024-11-23 PROCEDURE — 3074F SYST BP LT 130 MM HG: CPT | Performed by: PHYSICIAN ASSISTANT

## 2024-11-23 PROCEDURE — 99214 OFFICE O/P EST MOD 30 MIN: CPT | Performed by: PHYSICIAN ASSISTANT

## 2024-11-23 RX ORDER — POLYMYXIN B SULFATE AND TRIMETHOPRIM 1; 10000 MG/ML; [USP'U]/ML
1 SOLUTION OPHTHALMIC EVERY 4 HOURS
Qty: 10 ML | Refills: 0 | Status: SHIPPED | OUTPATIENT
Start: 2024-11-23 | End: 2024-11-30

## 2024-11-23 ASSESSMENT — FIBROSIS 4 INDEX: FIB4 SCORE: 0.97

## 2024-11-23 ASSESSMENT — ENCOUNTER SYMPTOMS
VOMITING: 0
ABDOMINAL PAIN: 0
FOREIGN BODY SENSATION: 0
EYE PAIN: 0
NAUSEA: 0
PHOTOPHOBIA: 0
FEVER: 0
EYE ITCHING: 1
DIARRHEA: 0
NEUROLOGICAL NEGATIVE: 1
BLURRED VISION: 0
DOUBLE VISION: 0
EYE DISCHARGE: 1
RESPIRATORY NEGATIVE: 1
EYE REDNESS: 1

## 2024-11-23 ASSESSMENT — VISUAL ACUITY: OU: 1

## 2024-11-23 NOTE — PROGRESS NOTES
Subjective     Heaven Chavis is a very pleasant 59 y.o. female who presents with Conjunctivitis (Pt states she thinks she may have pink eye. Left eye is red, symptoms present since last night. Left and right eye were crusted over this morning. She also has a hoarse voice. No sore throat though.)            Eye Problem   Both eyes are affected. This is a new problem. The current episode started yesterday. The problem occurs constantly. The problem has been unchanged. There was no injury mechanism. There is No known exposure to pink eye. She Does not wear contacts. Associated symptoms include an eye discharge, eye redness and itching. Pertinent negatives include no blurred vision, double vision, fever, foreign body sensation, nausea, photophobia, recent URI or vomiting. She has tried water for the symptoms.       PMH:  has a past medical history of Hashimoto's disease (2004) and MS (multiple sclerosis) ([facility]) (2007).  MEDS:   Current Outpatient Medications:     polymixin-trimethoprim (POLYTRIM) 58498-9.1 UNIT/ML-% Solution, Administer 1 Drop into both eyes every 4 hours for 7 days., Disp: 10 mL, Rfl: 0    nortriptyline (PAMELOR) 50 MG capsule, TAKE ONE CAPSULE BY MOUTH EVERY DAY IN THE EVENING, Disp: 90 Capsule, Rfl: 3    temazepam (RESTORIL) 30 MG capsule, TAKE ONE CAPSULE BY MOUTH EVERY EVENING AT BEDTIME AS NEEDED FOR SLEEP FOR 90 DAYS, Disp: 90 Capsule, Rfl: 1    baclofen (LIORESAL) 10 MG Tab, Take 1 Tablet by mouth 3 times a day., Disp: 270 Tablet, Rfl: 3    Multiple Vitamins-Minerals (MULTIVITAMIN ADULT PO), Take  by mouth., Disp: , Rfl:     Iron Carbonyl-Vitamin C-FOS (CHEWABLE IRON PO), Take  by mouth., Disp: , Rfl:     B Complex Vitamins (VITAMIN B COMPLEX PO), Take  by mouth., Disp: , Rfl:     Ascorbic Acid (VITAMIN C) 1000 MG Tab, Take  by mouth., Disp: , Rfl:     Omega 3-6-9 Fatty Acids (OMEGA 3-6-9 COMPLEX PO), Take  by mouth., Disp: , Rfl:     vitamin D (CHOLECALCIFEROL) 1000 UNIT Tab, Take 1,000 Units  "by mouth every day. 1,000 to 3,000 IUs a day, Disp: , Rfl:     SYNTHROID 150 MCG Tab, Take 150 mcg by mouth every day., Disp: , Rfl: 11    liothyronine (CYTOMEL) 5 MCG Tab, TAKE 2 TABLETS BY MOUTH EVERY MORNING & TAKE 1 TABLET IN THE EVENING, Disp: , Rfl: 11  ALLERGIES:   Allergies   Allergen Reactions    Dimethyl Fumarate Anaphylaxis    Milk Products Food Allergy Hives    Citrus Hives and Itching     SURGHX:   Past Surgical History:   Procedure Laterality Date    ABDOMINAL HYSTERECTOMY TOTAL      CHOLECYSTECTOMY      FOOT SURGERY      left foot reattached     SOCHX:  reports that she has been smoking cigarettes. She has never used smokeless tobacco. She reports that she does not currently use alcohol. She reports that she does not use drugs.  FH: family history includes Clotting Disorder in her father; Diabetes in her sister; Stroke in her mother; Thyroid in her sister, sister, sister, sister, sister, and sister.    Review of Systems   Constitutional:  Negative for fever.   HENT: Negative.     Eyes:  Positive for discharge, redness and itching. Negative for blurred vision, double vision, photophobia and pain.   Respiratory: Negative.     Gastrointestinal:  Negative for abdominal pain, diarrhea, nausea and vomiting.   Neurological: Negative.        Medications, Allergies, and current problem list reviewed today in Epic           Objective     /84 (BP Location: Left arm, Patient Position: Sitting, BP Cuff Size: Large adult)   Pulse 75   Temp 36.4 °C (97.6 °F) (Temporal)   Resp 14   Ht 1.632 m (5' 4.25\")   Wt 98.6 kg (217 lb 6.4 oz)   SpO2 98%   BMI 37.03 kg/m²      Physical Exam  Vitals and nursing note reviewed.   Constitutional:       General: She is not in acute distress.     Appearance: Normal appearance. She is well-developed. She is not ill-appearing, toxic-appearing or diaphoretic.   HENT:      Head: Normocephalic and atraumatic.      Right Ear: Hearing, tympanic membrane, ear canal and external " ear normal.      Left Ear: Hearing, tympanic membrane and external ear normal.      Nose: Nose normal. No congestion or rhinorrhea.      Mouth/Throat:      Mouth: Mucous membranes are moist.      Pharynx: Oropharynx is clear.   Eyes:      General: Lids are normal. Lids are everted, no foreign bodies appreciated. Vision grossly intact.         Right eye: No discharge.         Left eye: Discharge present.     Extraocular Movements: Extraocular movements intact.      Conjunctiva/sclera:      Right eye: Right conjunctiva is injected.      Left eye: Left conjunctiva is injected.      Pupils: Pupils are equal, round, and reactive to light.   Cardiovascular:      Rate and Rhythm: Normal rate and regular rhythm.      Heart sounds: Normal heart sounds.   Pulmonary:      Effort: Pulmonary effort is normal. No respiratory distress.      Breath sounds: Normal breath sounds. No stridor. No wheezing, rhonchi or rales.   Musculoskeletal:      Cervical back: Normal range of motion and neck supple.   Lymphadenopathy:      Cervical: No cervical adenopathy.   Skin:     General: Skin is warm and dry.   Neurological:      General: No focal deficit present.      Mental Status: She is alert and oriented to person, place, and time.   Psychiatric:         Mood and Affect: Mood normal.         Behavior: Behavior normal.         Thought Content: Thought content normal.         Judgment: Judgment normal.                             Assessment & Plan        Assessment & Plan  Bacterial conjunctivitis of left eye  This is a very pleasant 59-year-old female presenting with left eye redness and copious colored discharge spreading to the right eye.  No pain or vision changes. OTC meds and conservative measures as discussed    Orders:    polymixin-trimethoprim (POLYTRIM) 12002-0.1 UNIT/ML-% Solution; Administer 1 Drop into both eyes every 4 hours for 7 days.            I personally reviewed prior external notes and test results pertinent to today's  visit. Return to clinic or go to ED if symptoms worsen or persist. Red flag symptoms and indications for ED discussed at length. Patient/Parent/Guardian voices understanding.  AVS with post-visit instructions printed and provided or given verbally.  Follow-up with your primary care provider in 3-5 days. All side effects and potential interactions of prescribed medication discussed including allergic response, GI upset, tendon injury, rash, sedation, OCP effectiveness, etc.  Please note that this dictation was created using voice recognition software. I have made every reasonable attempt to correct obvious errors, but I expect that there are errors of grammar and possibly content that I did not discover before finalizing the note.

## 2024-11-25 ENCOUNTER — APPOINTMENT (OUTPATIENT)
Dept: NEUROLOGY | Facility: MEDICAL CENTER | Age: 59
End: 2024-11-25
Attending: PSYCHIATRY & NEUROLOGY
Payer: MEDICARE

## 2024-12-04 ENCOUNTER — APPOINTMENT (OUTPATIENT)
Dept: NEUROLOGY | Facility: MEDICAL CENTER | Age: 59
End: 2024-12-04
Attending: PSYCHIATRY & NEUROLOGY
Payer: MEDICARE

## 2024-12-31 ENCOUNTER — TELEPHONE (OUTPATIENT)
Dept: HEALTH INFORMATION MANAGEMENT | Facility: OTHER | Age: 59
End: 2024-12-31
Payer: MEDICARE

## 2025-01-28 ENCOUNTER — OFFICE VISIT (OUTPATIENT)
Dept: NEUROLOGY | Facility: MEDICAL CENTER | Age: 60
End: 2025-01-28
Attending: PSYCHIATRY & NEUROLOGY
Payer: MEDICARE

## 2025-01-28 VITALS
HEIGHT: 64 IN | HEART RATE: 80 BPM | WEIGHT: 207.89 LBS | OXYGEN SATURATION: 98 % | DIASTOLIC BLOOD PRESSURE: 68 MMHG | SYSTOLIC BLOOD PRESSURE: 116 MMHG | TEMPERATURE: 97.5 F | BODY MASS INDEX: 35.49 KG/M2

## 2025-01-28 DIAGNOSIS — Z73.3 MENTAL DISTRESS EVIDENT ON EXAMINATION: ICD-10-CM

## 2025-01-28 DIAGNOSIS — M19.171 POST-TRAUMATIC OSTEOARTHRITIS OF RIGHT FOOT: ICD-10-CM

## 2025-01-28 DIAGNOSIS — Z72.3 LACK OF PHYSICAL ACTIVITY: ICD-10-CM

## 2025-01-28 DIAGNOSIS — G35 MULTIPLE SCLEROSIS (HCC): ICD-10-CM

## 2025-01-28 DIAGNOSIS — Z72.0 TOBACCO USE: ICD-10-CM

## 2025-01-28 DIAGNOSIS — Z59.9 FINANCIAL DIFFICULTY: ICD-10-CM

## 2025-01-28 DIAGNOSIS — F32.9 REACTIVE DEPRESSION: ICD-10-CM

## 2025-01-28 DIAGNOSIS — Z59.819 HOUSING INSTABILITY: ICD-10-CM

## 2025-01-28 PROBLEM — Z79.890 LONG-TERM CURRENT USE OF THYROID HORMONE REPLACEMENT THERAPY: Status: ACTIVE | Noted: 2024-11-19

## 2025-01-28 PROCEDURE — 3074F SYST BP LT 130 MM HG: CPT | Performed by: PSYCHIATRY & NEUROLOGY

## 2025-01-28 PROCEDURE — 99212 OFFICE O/P EST SF 10 MIN: CPT | Performed by: PSYCHIATRY & NEUROLOGY

## 2025-01-28 PROCEDURE — 3078F DIAST BP <80 MM HG: CPT | Performed by: PSYCHIATRY & NEUROLOGY

## 2025-01-28 PROCEDURE — 99215 OFFICE O/P EST HI 40 MIN: CPT | Performed by: PSYCHIATRY & NEUROLOGY

## 2025-01-28 RX ORDER — AMANTADINE HYDROCHLORIDE 100 MG/1
100 CAPSULE, GELATIN COATED ORAL 2 TIMES DAILY
Qty: 180 CAPSULE | Refills: 0 | Status: SHIPPED | OUTPATIENT
Start: 2025-01-28 | End: 2025-04-28

## 2025-01-28 RX ORDER — MODAFINIL 200 MG/1
200 TABLET ORAL DAILY
Qty: 30 TABLET | Refills: 5 | Status: SHIPPED | OUTPATIENT
Start: 2025-01-28 | End: 2025-07-27

## 2025-01-28 RX ORDER — BACLOFEN 10 MG/1
10 TABLET ORAL 3 TIMES DAILY
Qty: 270 TABLET | Refills: 3 | Status: SHIPPED | OUTPATIENT
Start: 2025-01-28

## 2025-01-28 SDOH — ECONOMIC STABILITY - INCOME SECURITY: PROBLEM RELATED TO HOUSING AND ECONOMIC CIRCUMSTANCES, UNSPECIFIED: Z59.9

## 2025-01-28 SDOH — ECONOMIC STABILITY - HOUSING INSECURITY: HOUSING INSTABILITY UNSPECIFIED: Z59.819

## 2025-01-28 ASSESSMENT — PATIENT HEALTH QUESTIONNAIRE - PHQ9
5. POOR APPETITE OR OVEREATING: 0 - NOT AT ALL
SUM OF ALL RESPONSES TO PHQ QUESTIONS 1-9: 10
CLINICAL INTERPRETATION OF PHQ2 SCORE: 2

## 2025-01-28 ASSESSMENT — FIBROSIS 4 INDEX: FIB4 SCORE: 0.97

## 2025-01-28 NOTE — ASSESSMENT & PLAN NOTE
Pt states she had a relapse last fall of weakness in her right leg. Pt states she has had some improvement on her own. Pt states she has a lot of fatigue. Pt states that she feels like she needs a different medication from modafinil which just helps with the brain fog. Pt state she walks her dog and she is a bulldog which she can control. Pt states that her thyroid was off and she sees Dr Alves. Pt had 2 falls one where her leg gave out and one when she tripped on uneven surface in the yard.

## 2025-01-29 ENCOUNTER — TELEPHONE (OUTPATIENT)
Dept: PHARMACY | Facility: MEDICAL CENTER | Age: 60
End: 2025-01-29
Payer: MEDICARE
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
## 2025-01-29 NOTE — TELEPHONE ENCOUNTER
Received [employer] Start  request via [employer]  for amantadine (SYMMETREL) 100 MG Cap. (Quantity:180, Day Supply:90)     Insurance: [employer]  Member ID:  [id number]  BIN: [id number]  PCN: [id number]  Group: [id number]     Ran Test claim via [place] & medication Pays for a $34.88 copay. Will outreach to patient to offer specialty pharmacy services and or release to preferred pharmacy    Will release to pt's preferred pharmacy.

## 2025-01-30 ENCOUNTER — TELEPHONE (OUTPATIENT)
Dept: PHARMACY | Facility: MEDICAL CENTER | Age: 60
End: 2025-01-30
Payer: MEDICARE

## 2025-01-30 NOTE — TELEPHONE ENCOUNTER
Received New Start [place] request via [employer]  for   modafinil (PROVIGIL) 200 MG Tab. (Quantity:30, Day Supply:30)     Insurance: [employer]  Member ID:  [id number]  BIN: [id number]  PCN: [id number]  Group: [id number]     Ran Test claim via [place] & medication Rejects stating prior authorization is required.

## 2025-01-31 NOTE — TELEPHONE ENCOUNTER
Prior Authorization for     modafinil (PROVIGIL) 200 MG Tab    (Quantity: 30, Days: 30) has been submitted via Cover My Meds: Key ([id number]    Insurance: [employer]    Will follow up in 24-48 business hours.

## 2025-02-24 ENCOUNTER — HOSPITAL ENCOUNTER (OUTPATIENT)
Dept: LAB | Facility: MEDICAL CENTER | Age: 60
End: 2025-02-24
Attending: PSYCHIATRY & NEUROLOGY
Payer: MEDICARE

## 2025-02-24 DIAGNOSIS — G35 MULTIPLE SCLEROSIS (HCC): ICD-10-CM

## 2025-02-24 LAB
BASOPHILS # BLD AUTO: 0.6 % (ref 0–1.8)
BASOPHILS # BLD: 0.04 K/UL (ref 0–0.12)
EOSINOPHIL # BLD AUTO: 0.08 K/UL (ref 0–0.51)
EOSINOPHIL NFR BLD: 1.3 % (ref 0–6.9)
ERYTHROCYTE [DISTWIDTH] IN BLOOD BY AUTOMATED COUNT: 47.1 FL (ref 35.9–50)
HCT VFR BLD AUTO: 39.9 % (ref 37–47)
HGB BLD-MCNC: 13.1 G/DL (ref 12–16)
IMM GRANULOCYTES # BLD AUTO: 0.01 K/UL (ref 0–0.11)
IMM GRANULOCYTES NFR BLD AUTO: 0.2 % (ref 0–0.9)
LYMPHOCYTES # BLD AUTO: 2.11 K/UL (ref 1–4.8)
LYMPHOCYTES NFR BLD: 34.1 % (ref 22–41)
MCH RBC QN AUTO: 27 PG (ref 27–33)
MCHC RBC AUTO-ENTMCNC: 32.8 G/DL (ref 32.2–35.5)
MCV RBC AUTO: 82.1 FL (ref 81.4–97.8)
MONOCYTES # BLD AUTO: 0.44 K/UL (ref 0–0.85)
MONOCYTES NFR BLD AUTO: 7.1 % (ref 0–13.4)
NEUTROPHILS # BLD AUTO: 3.51 K/UL (ref 1.82–7.42)
NEUTROPHILS NFR BLD: 56.7 % (ref 44–72)
NRBC # BLD AUTO: 0 K/UL
NRBC BLD-RTO: 0 /100 WBC (ref 0–0.2)
PLATELET # BLD AUTO: 278 K/UL (ref 164–446)
PMV BLD AUTO: 9.4 FL (ref 9–12.9)
RBC # BLD AUTO: 4.86 M/UL (ref 4.2–5.4)
WBC # BLD AUTO: 6.2 K/UL (ref 4.8–10.8)

## 2025-02-24 PROCEDURE — 80053 COMPREHEN METABOLIC PANEL: CPT

## 2025-02-24 PROCEDURE — 36415 COLL VENOUS BLD VENIPUNCTURE: CPT

## 2025-02-24 PROCEDURE — 85025 COMPLETE CBC W/AUTO DIFF WBC: CPT

## 2025-02-25 LAB
ALBUMIN SERPL BCP-MCNC: 4 G/DL (ref 3.2–4.9)
ALBUMIN/GLOB SERPL: 1.5 G/DL
ALP SERPL-CCNC: 140 U/L (ref 30–99)
ALT SERPL-CCNC: 12 U/L (ref 2–50)
ANION GAP SERPL CALC-SCNC: 10 MMOL/L (ref 7–16)
AST SERPL-CCNC: 19 U/L (ref 12–45)
BILIRUB SERPL-MCNC: 0.5 MG/DL (ref 0.1–1.5)
BUN SERPL-MCNC: 12 MG/DL (ref 8–22)
CALCIUM ALBUM COR SERPL-MCNC: 9.1 MG/DL (ref 8.5–10.5)
CALCIUM SERPL-MCNC: 9.1 MG/DL (ref 8.5–10.5)
CHLORIDE SERPL-SCNC: 107 MMOL/L (ref 96–112)
CO2 SERPL-SCNC: 23 MMOL/L (ref 20–33)
CREAT SERPL-MCNC: 0.79 MG/DL (ref 0.5–1.4)
GFR SERPLBLD CREATININE-BSD FMLA CKD-EPI: 85 ML/MIN/1.73 M 2
GLOBULIN SER CALC-MCNC: 2.7 G/DL (ref 1.9–3.5)
GLUCOSE SERPL-MCNC: 99 MG/DL (ref 65–99)
POTASSIUM SERPL-SCNC: 3.8 MMOL/L (ref 3.6–5.5)
PROT SERPL-MCNC: 6.7 G/DL (ref 6–8.2)
SODIUM SERPL-SCNC: 140 MMOL/L (ref 135–145)

## 2025-03-03 ENCOUNTER — OFFICE VISIT (OUTPATIENT)
Dept: MEDICAL GROUP | Facility: PHYSICIAN GROUP | Age: 60
End: 2025-03-03
Attending: PSYCHIATRY & NEUROLOGY
Payer: MEDICARE

## 2025-03-03 VITALS
TEMPERATURE: 97.2 F | DIASTOLIC BLOOD PRESSURE: 64 MMHG | WEIGHT: 202.2 LBS | OXYGEN SATURATION: 88 % | SYSTOLIC BLOOD PRESSURE: 112 MMHG | HEIGHT: 64 IN | RESPIRATION RATE: 16 BRPM | BODY MASS INDEX: 34.52 KG/M2 | HEART RATE: 91 BPM

## 2025-03-03 DIAGNOSIS — R79.9 ABNORMAL BLOOD CHEMISTRY: ICD-10-CM

## 2025-03-03 DIAGNOSIS — G35 MULTIPLE SCLEROSIS (HCC): ICD-10-CM

## 2025-03-03 DIAGNOSIS — Z13.9 ENCOUNTER FOR SCREENING: ICD-10-CM

## 2025-03-03 DIAGNOSIS — Z12.31 ENCOUNTER FOR SCREENING MAMMOGRAM FOR BREAST CANCER: ICD-10-CM

## 2025-03-03 PROCEDURE — 99213 OFFICE O/P EST LOW 20 MIN: CPT

## 2025-03-03 PROCEDURE — 3078F DIAST BP <80 MM HG: CPT

## 2025-03-03 PROCEDURE — 3074F SYST BP LT 130 MM HG: CPT

## 2025-03-03 ASSESSMENT — ENCOUNTER SYMPTOMS
BACK PAIN: 0
HEARTBURN: 0
INSOMNIA: 0
HEADACHES: 0
DIARRHEA: 0
DEPRESSION: 0
WEIGHT LOSS: 0
CONSTIPATION: 0
COUGH: 0

## 2025-03-03 ASSESSMENT — FIBROSIS 4 INDEX: FIB4 SCORE: 1.2

## 2025-03-03 NOTE — ASSESSMENT & PLAN NOTE
-Chronic, not well-controlled is an active management with neurology possible chemotherapy in the future.  -Advised continued follow-up with neurology and endorsed following the recommendations and interventions.

## 2025-03-03 NOTE — PROGRESS NOTES
Subjective:     CC:  Diagnoses of Multiple sclerosis (HCC), Abnormal blood chemistry, Encounter for screening mammogram for breast cancer, and Encounter for screening were pertinent to this visit.    HISTORY OF THE PRESENT ILLNESS: Patient is a 59 y.o. female. This pleasant patient is here today to establish care and discuss healthcare maintenance and blood work.    Problem   Post-Traumatic Osteoarthritis of Right Foot    Will typically be exacerbated by MS flare.     Overactive Bladder    Is taking nortriptyline that works okay-keegan.  Urinating minimum of 4 times daily.     Palpitations    Has noted some food related triggers (carbohydrate rich foods in particular, ketchup, temperature). Denies any SOB or chest pain that accompany them. Now occurring twice weekly.     Reactive Depression    Has had since her  passed away unexpectedly 2020. Deferring BHC at this time. On nortriptyline, but more for nighttime pain/sleep sympotms an dnot necessarily depression     Iron Deficiency Anemia    Most recent CBC 2/2025 wnl.    Iron/Vit-C combo supplement seems to be doing. Denies constipation.     Multiple Sclerosis (Hcc)    ON OS in OCT of 2006, DX in 2007 and she had a positive spinal tap and she saw Dr. Murphy and Copaxone did not work Pt went to a Zattikka study and that made her sick. Pt was back on Copaxone and Hershewe. She started with LDN in about 2009.   Pt has tried Tecfidera and it dried up her skin, had permanent drie mouth.  Took ocrelizumab in 2021.  Patient took first course in 2024 october but did not take the second year     No seeing Dr. Bloch, is considering chemotherapy. Seeing every 3-6 mos. Next appointment in 5/27/2025     Postoperative Hypothyroidism    Thyroid removed due to cancer concern and is now on synthroid of 150 mcg. Dr. Sandoval is her endocrinologist. Currently feels stable on her current medication.    TSH: 0.15 (11/2024)  Free Thyroxine: 3.58 (11/2024)  TPO-ab: Deferred     Eczema     "Comes and goes, uses honey lotion for out breaks.         Health Maintenance: Completed    ROS:   Review of Systems   Constitutional:  Negative for weight loss.   Respiratory:  Negative for cough.    Cardiovascular:  Negative for leg swelling.   Gastrointestinal:  Negative for constipation, diarrhea and heartburn.   Genitourinary:  Negative for dysuria, frequency and urgency.   Musculoskeletal:  Negative for back pain.   Neurological:  Negative for headaches.   Psychiatric/Behavioral:  Negative for depression. The patient does not have insomnia.          Objective:     Exam: /64   Pulse 91   Temp 36.2 °C (97.2 °F) (Temporal)   Resp 16   Ht 1.626 m (5' 4\")   Wt 91.7 kg (202 lb 3.2 oz)   SpO2 88%  Body mass index is 34.71 kg/m².    Physical Exam  Constitutional:       General: She is not in acute distress.     Appearance: Normal appearance. She is not ill-appearing.   HENT:      Head: Normocephalic and atraumatic.      Right Ear: Tympanic membrane normal. There is no impacted cerumen.      Left Ear: Tympanic membrane normal. There is no impacted cerumen.      Nose: Nose normal.      Mouth/Throat:      Mouth: Mucous membranes are moist.      Pharynx: Oropharynx is clear. No posterior oropharyngeal erythema.   Eyes:      Extraocular Movements: Extraocular movements intact.      Conjunctiva/sclera: Conjunctivae normal.      Pupils: Pupils are equal, round, and reactive to light.   Cardiovascular:      Rate and Rhythm: Normal rate and regular rhythm.      Heart sounds: No murmur heard.  Pulmonary:      Effort: Pulmonary effort is normal.      Breath sounds: Normal breath sounds.   Abdominal:      General: Abdomen is flat. There is no distension.      Palpations: Abdomen is soft.      Tenderness: There is no abdominal tenderness.   Musculoskeletal:         General: Normal range of motion.      Cervical back: Normal range of motion.   Lymphadenopathy:      Cervical: No cervical adenopathy.   Skin:     General: " Skin is warm and dry.      Capillary Refill: Capillary refill takes less than 2 seconds.   Neurological:      General: No focal deficit present.      Mental Status: She is alert and oriented to person, place, and time.      Deep Tendon Reflexes: Reflexes normal.   Psychiatric:         Mood and Affect: Mood normal.             Assessment & Plan:   59 y.o. female with the following -    Assessment & Plan  Multiple sclerosis (HCC)  -Chronic, not well-controlled is an active management with neurology possible chemotherapy in the future.  -Advised continued follow-up with neurology and endorsed following the recommendations and interventions.       Abnormal blood chemistry  -Labs per orders  Orders:    HEMOGLOBIN A1C; Future    Lipid Profile; Future    VITAMIN D,25 HYDROXY (DEFICIENCY); Future    Encounter for screening mammogram for breast cancer  Orders:    MA-SCREENING MAMMO BILAT W/TOMOSYNTHESIS W/CAD; Future    Encounter for screening  Orders:    HIV AG/AB COMBO ASSAY SCREENING; Future      Return in about 2 months (around 5/3/2025) for f/u [facility] Annual Wellness, 3 months for MS f/u.    Please note that this dictation was created using voice recognition software. I have made every reasonable attempt to correct obvious errors, but I expect that there are errors of grammar and possibly content that I did not discover before finalizing the note.        Jarek García D.O.

## 2025-03-04 ENCOUNTER — HOSPITAL ENCOUNTER (OUTPATIENT)
Dept: LAB | Facility: MEDICAL CENTER | Age: 60
End: 2025-03-04
Payer: MEDICARE

## 2025-03-04 DIAGNOSIS — Z13.9 ENCOUNTER FOR SCREENING: ICD-10-CM

## 2025-03-04 DIAGNOSIS — R79.9 ABNORMAL BLOOD CHEMISTRY: ICD-10-CM

## 2025-03-04 LAB
EST. AVERAGE GLUCOSE BLD GHB EST-MCNC: 108 MG/DL
HBA1C MFR BLD: 5.4 % (ref 4–5.6)
HIV 1+2 AB+HIV1 P24 AG SERPL QL IA: NORMAL

## 2025-03-04 PROCEDURE — 83036 HEMOGLOBIN GLYCOSYLATED A1C: CPT | Mod: GA

## 2025-03-04 PROCEDURE — 80061 LIPID PANEL: CPT

## 2025-03-04 PROCEDURE — G0475 HIV COMBINATION ASSAY: HCPCS | Mod: GA

## 2025-03-04 PROCEDURE — 82306 VITAMIN D 25 HYDROXY: CPT | Mod: GA

## 2025-03-04 PROCEDURE — 36415 COLL VENOUS BLD VENIPUNCTURE: CPT | Mod: GA

## 2025-03-05 LAB
25(OH)D3 SERPL-MCNC: 26 NG/ML (ref 30–100)
CHOLEST SERPL-MCNC: 169 MG/DL (ref 100–199)
FASTING STATUS PATIENT QL REPORTED: NORMAL
HDLC SERPL-MCNC: 61 MG/DL
LDLC SERPL CALC-MCNC: 89 MG/DL
TRIGL SERPL-MCNC: 97 MG/DL (ref 0–149)

## 2025-03-17 ENCOUNTER — RESULTS FOLLOW-UP (OUTPATIENT)
Dept: MEDICAL GROUP | Facility: PHYSICIAN GROUP | Age: 60
End: 2025-03-17
Payer: MEDICARE

## 2025-03-27 DIAGNOSIS — G35 MULTIPLE SCLEROSIS (HCC): ICD-10-CM

## 2025-03-27 DIAGNOSIS — F51.01 PRIMARY INSOMNIA: ICD-10-CM

## 2025-03-28 NOTE — TELEPHONE ENCOUNTER
Received request via: Pharmacy    Medication Name/Dosage Temazepam 30mg    When was medication last prescribed 9/27/24    How many refills were previously provided 1    How many Refills does he patient have left from last prescription 0    Was the patient seen in the last year in this department? Yes   Date of last office visit 1/28/25     Per last Neurology Office Visit, when was the date of next follow up visit set for?                            Date of office visit follow up request 5/28/25     Does the patient have an upcoming appointment? Yes   If yes, when 5/27/25             If no, schedule appointment -    Does the patient have USP Plus and need 100 day supply (blood pressure, diabetes and cholesterol meds only)? Patient does not have SCP

## 2025-03-31 RX ORDER — TEMAZEPAM 30 MG/1
30 CAPSULE ORAL NIGHTLY PRN
Qty: 90 CAPSULE | Refills: 0 | Status: SHIPPED | OUTPATIENT
Start: 2025-03-31 | End: 2025-06-29

## 2025-05-02 DIAGNOSIS — N32.81 OVERACTIVE BLADDER: ICD-10-CM

## 2025-05-02 DIAGNOSIS — G35 MULTIPLE SCLEROSIS (HCC): ICD-10-CM

## 2025-05-05 RX ORDER — NORTRIPTYLINE HYDROCHLORIDE 50 MG/1
50 CAPSULE ORAL EVERY EVENING
Qty: 90 CAPSULE | Refills: 0 | Status: SHIPPED | OUTPATIENT
Start: 2025-05-05

## 2025-05-24 DIAGNOSIS — G35 MULTIPLE SCLEROSIS (HCC): ICD-10-CM

## 2025-05-27 NOTE — TELEPHONE ENCOUNTER
Received request via: Pharmacy    Medication Name/Dosage amantadine (SYMMETREL) 100 MG Cap     When was medication last prescribed 01/28/25    How many refills were previously provided 0    How many Refills does he patient have left from last prescription 0    Was the patient seen in the last year in this department? Yes   Date of last office visit 01/28/25     Per last Neurology Office Visit, when was the date of next follow up visit set for?                            Date of office visit follow up request 4 months     Does the patient have an upcoming appointment? Yes   If yes, when 05/27/25             If no, schedule appointment N/A    Does the patient have long term Plus and need 100 day supply (blood pressure, diabetes and cholesterol meds only)? Patient does not have SCP

## 2025-05-28 DIAGNOSIS — F51.01 PRIMARY INSOMNIA: ICD-10-CM

## 2025-05-28 DIAGNOSIS — G35 MULTIPLE SCLEROSIS (HCC): ICD-10-CM

## 2025-05-28 RX ORDER — AMANTADINE HYDROCHLORIDE 100 MG/1
100 CAPSULE, GELATIN COATED ORAL 2 TIMES DAILY
Qty: 180 CAPSULE | Refills: 0 | Status: SHIPPED | OUTPATIENT
Start: 2025-05-28

## 2025-05-29 ENCOUNTER — TELEPHONE (OUTPATIENT)
Dept: PHARMACY | Facility: MEDICAL CENTER | Age: 60
End: 2025-05-29
Payer: MEDICARE

## 2025-05-29 RX ORDER — MODAFINIL 200 MG/1
200 TABLET ORAL DAILY
Qty: 30 TABLET | Refills: 5 | Status: SHIPPED | OUTPATIENT
Start: 2025-05-29 | End: 2025-11-25

## 2025-05-29 RX ORDER — TEMAZEPAM 30 MG/1
30 CAPSULE ORAL NIGHTLY PRN
Qty: 90 CAPSULE | Refills: 0 | Status: SHIPPED | OUTPATIENT
Start: 2025-05-29 | End: 2025-08-27

## 2025-05-29 NOTE — TELEPHONE ENCOUNTER
Received New Start  request via [facility]  for amantadine (SYMMETREL) 100 MG Cap. (Quantity:180, Day Supply:90)     Insurance: First Choice  Member ID:  [id number]  BIN: [id number]  PCN: [id number]  Group: [id number]     Ran Test claim via [place] & medication Pays for a $34.88 copay. Will outreach to patient to offer specialty pharmacy services and or release to preferred pharmacy    Will release to pt's preferred pharmacy.

## 2025-05-29 NOTE — TELEPHONE ENCOUNTER
Received New Start request via [facility]  for   modafinil (PROVIGIL) 200 MG Tab. (Quantity:30, Day Supply:30)     Insurance: First Choice  Member ID:  [id number]  BIN: [id number]  PCN: [id number]  Group: [id number]     Ran Test claim via [place] [facility] medication Pays for a $15 copay. Will outreach to patient to offer specialty pharmacy services and or release to preferred pharmacy    Will release to pt's preferred pharmacy.

## 2025-06-02 ENCOUNTER — OFFICE VISIT (OUTPATIENT)
Dept: MEDICAL GROUP | Facility: PHYSICIAN GROUP | Age: 60
End: 2025-06-02
Payer: MEDICARE

## 2025-06-02 VITALS
HEIGHT: 64 IN | HEART RATE: 95 BPM | DIASTOLIC BLOOD PRESSURE: 84 MMHG | SYSTOLIC BLOOD PRESSURE: 130 MMHG | OXYGEN SATURATION: 98 % | RESPIRATION RATE: 16 BRPM | BODY MASS INDEX: 33.97 KG/M2 | TEMPERATURE: 98.4 F | WEIGHT: 199 LBS

## 2025-06-02 DIAGNOSIS — Z00.00 ENCOUNTER FOR MEDICARE ANNUAL WELLNESS EXAM: Primary | ICD-10-CM

## 2025-06-02 PROCEDURE — 3075F SYST BP GE 130 - 139MM HG: CPT

## 2025-06-02 PROCEDURE — 3079F DIAST BP 80-89 MM HG: CPT

## 2025-06-02 PROCEDURE — G0438 PPPS, INITIAL VISIT: HCPCS

## 2025-06-02 RX ORDER — LEVOTHYROXINE SODIUM 150 MCG
150 TABLET ORAL
Qty: 90 TABLET | Refills: 3 | Status: SHIPPED | OUTPATIENT
Start: 2025-06-02

## 2025-06-02 RX ORDER — LIOTHYRONINE SODIUM 5 UG/1
TABLET ORAL
Qty: 270 TABLET | Refills: 11 | Status: SHIPPED | OUTPATIENT
Start: 2025-06-02

## 2025-06-02 ASSESSMENT — PATIENT HEALTH QUESTIONNAIRE - PHQ9
5. POOR APPETITE OR OVEREATING: 1 - SEVERAL DAYS
SUM OF ALL RESPONSES TO PHQ QUESTIONS 1-9: 16
CLINICAL INTERPRETATION OF PHQ2 SCORE: 5

## 2025-06-02 ASSESSMENT — FIBROSIS 4 INDEX: FIB4 SCORE: 1.2

## 2025-06-02 ASSESSMENT — ENCOUNTER SYMPTOMS: GENERAL WELL-BEING: FAIR

## 2025-06-02 ASSESSMENT — ACTIVITIES OF DAILY LIVING (ADL): BATHING_REQUIRES_ASSISTANCE: 0

## 2025-06-02 NOTE — PROGRESS NOTES
Chief Complaint   Patient presents with    Annual Exam     Medicare annual        HPI:  Heaven Chavis is a 59 y.o. here for Medicare Annual Wellness Visit     Patient Active Problem List    Diagnosis Date Noted    Post-traumatic osteoarthritis of right foot 01/28/2025    Overactive bladder 05/20/2024    Palpitations 01/06/2021    Reactive depression 05/20/2020    Iron deficiency anemia 05/19/2020    Multiple sclerosis (HCC) 03/12/2019    Postoperative hypothyroidism 03/12/2019    Eczema 03/12/2019       Current Medications[1]       Current supplements as per medication list.     Allergies: Dimethyl fumarate, Milk products food allergy, and Citrus    Current social contact/activities: yes, is involved in too.me, regularly meets with family and friends. Has strong support from daughter and brother who live close by    She  reports that she has been smoking cigarettes. She has never used smokeless tobacco. She reports current alcohol use. She reports that she does not use drugs.  Ready to quit: Not Answered  Counseling given: Not Answered      ROS:    Gait: Uses a sometimes uses a walker or cane   Ostomy: No  Other tubes: No  Amputations: No  Chronic oxygen use: No  Last eye exam: 20243  Wears hearing aids: No   : Reports urinary leakage during the last 6 months that has somewhat interfered with their daily activities or sleep.    Screening:    Depression Screening  Little interest or pleasure in doing things?  3 - nearly every day  Feeling down, depressed , or hopeless? 2 - more than half the days  Trouble falling or staying asleep, or sleeping too much?  3 - nearly every day  Feeling tired or having little energy?  3 - nearly every day  Poor appetite or overeating?  1 - several days  Feeling bad about yourself - or that you are a failure or have let yourself or your family down? 0 - not at all  Trouble concentrating on things, such as reading the newspaper or watching television? 2 - more than half the  days  Moving or speaking so slowly that other people could have noticed.  Or the opposite - being so fidgety or restless that you have been moving around a lot more than usual?  2 - more than half the days  Thoughts that you would be better off dead, or of hurting yourself?  0 - not at all  Patient Health Questionnaire Score: 16    If depressive symptoms identified deferred to follow up visit unless specifically addressed in assessment and plan.    Interpretation of PHQ-9 Total Score   Score Severity   1-4 No Depression   5-9 Mild Depression   10-14 Moderate Depression   15-19 Moderately Severe Depression   20-27 Severe Depression    Screening for Cognitive Impairment  Do you or any of your friends or family members have any concern about your memory? Yes  Three Minute Recall (Village, Kitchen, Baby) 3/3    Joseph clock face with all 12 numbers and set the hands to show 10 minutes past 11.  Yes    Cognitive concerns identified deferred for follow up unless specifically addressed in assessment and plan.    Fall Risk Assessment  Has the patient had two or more falls in the last year or any fall with injury in the last year?  No    Safety Assessment  Do you always wear your seatbelt?  Yes  Any changes to home needed to function safely? No  Difficulty hearing.  Yes  Patient counseled about all safety risks that were identified.    Functional Assessment ADLs  Are there any barriers preventing you from cooking for yourself or meeting nutritional needs?  No.    Are there any barriers preventing you from driving safely or obtaining transportation?  No.    Are there any barriers preventing you from using a telephone or calling for help?  No    Are there any barriers preventing you from shopping?  No.    Are there any barriers preventing you from taking care of your own finances?  No    Are there any barriers preventing you from managing your medications?  No    Are there any barriers preventing you from showering, bathing or  dressing yourself? No    Are there any barriers preventing you from doing housework or laundry? No    Are there any barriers preventing you from using the toilet?No    Are you currently engaging in any exercise or physical activity?  Yes.      Self-Assessment of Health  What is your perception of your health? Fair    Do you sleep more than six hours a night? No    In the past 7 days, how much did pain keep you from doing your normal work? A lot    Do you spend quality time with family or friends (virtually or in person)? Yes    Do you usually eat a heart healthy diet that constists of a variety of fruits, vegetables, whole grains and fiber? Yes    Do you eat foods high in fat and/or Fast Food more than three times per week? No    How concerned are you that your medical conditions are not being well managed? a little    Are you worried that in the next 2 months, you may not have stable housing that you own, rent, or stay in as part of a household? No        Advance Care Planning  Do you have an Advance Directive, Living Will, Durable Power of , or POLST? Yes  Advance Directive Living Will Durable Power of  POLST is not on file - instructed patient to bring in a copy to scan into their chart      Health Maintenance Summary            Current Care Gaps       Mammogram (Yearly) Overdue since 4/1/2020 03/03/2025  Order placed for MA-SCREENING MAMMO BILAT W/TOMOSYNTHESIS W/CAD by Jarek García D.O.    04/01/2019  MA-SCREENING MAMMO BILAT W/TOMOSYNTHESIS W/CAD                      Upcoming       Zoster (Shingles) Vaccines (1 of 2) Postponed until 8/3/2025     No completion history exists for this topic.              Influenza Vaccine (Season Ended) Postponed until 3/3/2026      09/05/2020  Imm Admin: Influenza, unspecified formulation              IMM DTaP/Tdap/Td Vaccine (1 - Tdap) Postponed until 3/3/2026     No completion history exists for this topic.              Pneumococcal Vaccine: 50+  Years (1 of 2 - PCV) Postponed until 3/3/2026     No completion history exists for this topic.              Annual Wellness Visit (Yearly) Next due on 6/2/2026 06/02/2025  Visit Dx: Encounter for Medicare annual wellness exam    06/02/2025  Level of Service: [place] ANNUAL WELLNESS VISIT-INCLUDES PPPS SUBSEQUE*    06/02/2025  Done              Colorectal Cancer Screening (Colonoscopy - Preferred) Next due on 8/24/2027 08/24/2017  REFERRAL TO GI FOR COLONOSCOPY                      Completed or No Longer Recommended       Hepatitis C Screening  Completed      11/08/2019  Hepatitis C Antibody component of HEP C VIRUS ANTIBODY              Hepatitis B Vaccine (Hep B) (Series Information) Aged Out     No completion history exists for this topic.              HPV Vaccines (Series Information) Aged Out     No completion history exists for this topic.              Polio Vaccine (Inactivated Polio) (Series Information) Aged Out     No completion history exists for this topic.              Meningococcal Immunization (Series Information) Aged Out     No completion history exists for this topic.              Meningococcal B Vaccine (Series Information) Aged Out     No completion history exists for this topic.              Hepatitis A Vaccine (Hep A)  Discontinued     No completion history exists for this topic.              COVID-19 Vaccine  Discontinued     No completion history exists for this topic.                            Patient Care Team:  Jarek García D.O. as PCP - General (Family Medicine)  Romeo Diehl M.D. as Consulting Physician (Cardiovascular Disease (Cardiology))      Social History[2]  Family History   Problem Relation Age of Onset    Stroke Mother     Clotting Disorder Father     Thyroid Sister     Thyroid Sister     Diabetes Sister     Thyroid Sister     Thyroid Sister     Thyroid Sister     Thyroid Sister      She  has a past medical history of Hashimoto's disease (2004) and MS (multiple  "sclerosis) ([facility]) (2007).   Past Surgical History[3]    Exam:   /84 (BP Location: Right arm, Patient Position: Sitting, BP Cuff Size: Large adult)   Pulse 95   Temp 36.9 °C (98.4 °F) (Temporal)   Resp 16   Ht 1.626 m (5' 4\")   Wt 90.3 kg (199 lb)   SpO2 98%  Body mass index is 34.16 kg/m².    Hearing fair.    Dentition poor  Alert, oriented in no acute distress.  Eye contact is good, speech goal directed, affect calm    Assessment and Plan. The following treatment and monitoring plan is recommended:    1. Encounter for Medicare annual wellness exam    Other orders  - liothyronine (CYTOMEL) 5 MCG Tab; TAKE 2 TABLETS BY MOUTH EVERY MORNING & TAKE 1 TABLET IN THE EVENING  Dispense: 270 Tablet; Refill: 11  - SYNTHROID 150 MCG Tab; Take 1 Tablet by mouth every day.  Dispense: 90 Tablet; Refill: 3      Services suggested: No services needed at this time  Health Care Screening: Age-appropriate preventive services recommended by USPTF and ACIP covered by Medicare were discussed today. Services ordered if indicated and agreed upon by the patient.  Referrals offered: Community-based lifestyle interventions to reduce health risks and promote self-management and wellness, fall prevention, nutrition, physical activity, tobacco-use cessation, weight loss, and mental health services as per orders if indicated.    Discussion today about general wellness and lifestyle habits:    Prevent falls and reduce trip hazards; Cautioned about securing or removing rugs.  Have a working fire alarm and carbon monoxide detector;   Engage in regular physical activity and social activities     Follow-up: Return in about 6 months (around 12/2/2025) for f/u thyroid and MS.         [1]   Current Outpatient Medications   Medication Sig Dispense Refill    liothyronine (CYTOMEL) 5 MCG Tab TAKE 2 TABLETS BY MOUTH EVERY MORNING & TAKE 1 TABLET IN THE EVENING 270 Tablet 11    SYNTHROID 150 MCG Tab Take 1 Tablet by mouth every day. 90 Tablet 3    " modafinil (PROVIGIL) 200 MG Tab Take 1 Tablet by mouth every day for 180 days. 30 Tablet 5    temazepam (RESTORIL) 30 MG capsule Take 1 Capsule by mouth at bedtime as needed for Sleep for up to 90 days. 90 Capsule 0    amantadine (SYMMETREL) 100 MG Cap TAKE ONE CAPSULE BY MOUTH TWICE A  Capsule 0    nortriptyline (PAMELOR) 50 MG capsule TAKE 1 CAPSULE BY MOUTH IN THE EVENING 90 Capsule 0    baclofen (LIORESAL) 10 MG Tab Take 1 Tablet by mouth 3 times a day. 270 Tablet 3    Multiple Vitamins-Minerals (MULTIVITAMIN ADULT PO) Take  by mouth.      Iron Carbonyl-Vitamin C-FOS (CHEWABLE IRON PO) Take  by mouth.      B Complex Vitamins (VITAMIN B COMPLEX PO) Take  by mouth.      Ascorbic Acid (VITAMIN C) 1000 MG Tab Take  by mouth.      Omega 3-6-9 Fatty Acids (OMEGA 3-6-9 COMPLEX PO) Take  by mouth.      vitamin D (CHOLECALCIFEROL) 1000 UNIT Tab Take 1,000 Units by mouth every day. 1,000 to 3,000 IUs a day       No current facility-administered medications for this visit.   [2]   Social History  Tobacco Use    Smoking status: Some Days     Current packs/day: 0.00     Types: Cigarettes     Last attempt to quit: 2000     Years since quittin.4    Smokeless tobacco: Never   Vaping Use    Vaping status: Never Used   Substance Use Topics    Alcohol use: Yes     Comment: occ    Drug use: No   [3]   Past Surgical History:  Procedure Laterality Date    CHOLECYSTECTOMY      ABDOMINAL HYSTERECTOMY TOTAL      Due to scar tissue from LCTS x3, causing pain    FOOT SURGERY      left foot reattached

## 2025-07-09 DIAGNOSIS — F51.01 PRIMARY INSOMNIA: ICD-10-CM

## 2025-07-09 DIAGNOSIS — G35 MULTIPLE SCLEROSIS (HCC): ICD-10-CM

## 2025-07-10 RX ORDER — TEMAZEPAM 30 MG/1
30 CAPSULE ORAL NIGHTLY PRN
Qty: 90 CAPSULE | Refills: 0 | Status: SHIPPED | OUTPATIENT
Start: 2025-07-10 | End: 2025-07-29 | Stop reason: SDUPTHER

## 2025-07-29 ENCOUNTER — TELEPHONE (OUTPATIENT)
Dept: PHARMACY | Facility: MEDICAL CENTER | Age: 60
End: 2025-07-29
Payer: MEDICARE

## 2025-07-29 ENCOUNTER — OFFICE VISIT (OUTPATIENT)
Dept: NEUROLOGY | Facility: MEDICAL CENTER | Age: 60
End: 2025-07-29
Attending: PSYCHIATRY & NEUROLOGY
Payer: MEDICARE

## 2025-07-29 VITALS
HEART RATE: 83 BPM | TEMPERATURE: 96.6 F | DIASTOLIC BLOOD PRESSURE: 70 MMHG | SYSTOLIC BLOOD PRESSURE: 126 MMHG | WEIGHT: 195.99 LBS | BODY MASS INDEX: 33.46 KG/M2 | OXYGEN SATURATION: 99 % | HEIGHT: 64 IN

## 2025-07-29 DIAGNOSIS — F51.01 PRIMARY INSOMNIA: ICD-10-CM

## 2025-07-29 DIAGNOSIS — G35 MULTIPLE SCLEROSIS (HCC): Primary | ICD-10-CM

## 2025-07-29 DIAGNOSIS — N32.81 OVERACTIVE BLADDER: ICD-10-CM

## 2025-07-29 PROCEDURE — 99214 OFFICE O/P EST MOD 30 MIN: CPT

## 2025-07-29 PROCEDURE — 99215 OFFICE O/P EST HI 40 MIN: CPT | Performed by: PSYCHIATRY & NEUROLOGY

## 2025-07-29 PROCEDURE — 3078F DIAST BP <80 MM HG: CPT | Performed by: PSYCHIATRY & NEUROLOGY

## 2025-07-29 PROCEDURE — 3074F SYST BP LT 130 MM HG: CPT | Performed by: PSYCHIATRY & NEUROLOGY

## 2025-07-29 RX ORDER — TEMAZEPAM 30 MG/1
30 CAPSULE ORAL NIGHTLY PRN
Qty: 90 CAPSULE | Refills: 1 | Status: SHIPPED | OUTPATIENT
Start: 2025-10-08 | End: 2026-04-06

## 2025-07-29 RX ORDER — BACLOFEN 10 MG/1
10 TABLET ORAL 3 TIMES DAILY
Qty: 270 TABLET | Refills: 3 | Status: SHIPPED | OUTPATIENT
Start: 2025-07-29 | End: 2025-07-29 | Stop reason: SDUPTHER

## 2025-07-29 RX ORDER — NORTRIPTYLINE HYDROCHLORIDE 50 MG/1
50 CAPSULE ORAL EVERY EVENING
Qty: 90 CAPSULE | Refills: 3 | Status: SHIPPED | OUTPATIENT
Start: 2025-07-29

## 2025-07-29 RX ORDER — AMANTADINE HYDROCHLORIDE 100 MG/1
100 CAPSULE, GELATIN COATED ORAL 2 TIMES DAILY
Qty: 180 CAPSULE | Refills: 3 | Status: SHIPPED | OUTPATIENT
Start: 2025-07-29

## 2025-07-29 RX ORDER — BACLOFEN 10 MG/1
10 TABLET ORAL 3 TIMES DAILY
Qty: 270 TABLET | Refills: 3 | Status: SHIPPED | OUTPATIENT
Start: 2025-07-29

## 2025-07-29 ASSESSMENT — PATIENT HEALTH QUESTIONNAIRE - PHQ9
5. POOR APPETITE OR OVEREATING: 1 - SEVERAL DAYS
SUM OF ALL RESPONSES TO PHQ QUESTIONS 1-9: 8
CLINICAL INTERPRETATION OF PHQ2 SCORE: 1

## 2025-07-29 ASSESSMENT — FIBROSIS 4 INDEX: FIB4 SCORE: 1.22

## 2025-07-29 NOTE — ASSESSMENT & PLAN NOTE
Pt is due for her second year of [employer] and she just got some patient assistance approval. Pt states she feels progression since she has been off of the [employer]. She completed year 1 in the fall of 2023. Patient walks her dog and she can go varying distances depending on the day.Pt states that normally her left side is weaker but she feels like her right side is progressing and she thinks it is more than the hip issues. She is very heat sensitive.

## 2025-07-29 NOTE — PROGRESS NOTES
Chief Complaint   Patient presents with    Follow-Up     ms       Problem List Items Addressed This Visit       Multiple sclerosis (HCC) - Primary    Pt is due for her second year of [employer] and she just got some patient assistance approval. Pt states she feels progression since she has been off of the [employer]. She completed year 1 in the fall of 2023. Patient walks her dog and she can go varying distances depending on the day.Pt states that normally her left side is weaker but she feels like her right side is progressing and she thinks it is more than the hip issues. She is very heat sensitive.         Relevant Medications    Cladribine, 8 Tabs, 10 MG Tablet Therapy Pack    amantadine (SYMMETREL) 100 MG Cap    nortriptyline (PAMELOR) 50 MG capsule    temazepam (RESTORIL) 30 MG capsule (Start on 10/8/2025)    baclofen (LIORESAL) 10 MG Tab    Other Relevant Orders    MR-BRAIN-WITH & W/O    CBC WITH DIFFERENTIAL    Comp Metabolic Panel    Overactive bladder    Pt has overactive bladder.         Relevant Medications    nortriptyline (PAMELOR) 50 MG capsule    Primary insomnia    Pt has about 6 hours of sleep and she does sometimes takes naps depending on her schedule. Pt states that if she sleeps less than she needs to nap in afternoon. She takes temazepam.         Relevant Medications    temazepam (RESTORIL) 30 MG capsule (Start on 10/8/2025)       History of present illness:  Heaven Chavis 60 y.o. female presents today for MS treatment and a need to get started on her second year of [employer] which was just approved for patient assistance.    Past medical history:   Past Medical History[1]    Past surgical history:   Past Surgical History[2]    Family history:   Family History   Problem Relation Age of Onset    Stroke Mother     Clotting Disorder Father     Thyroid Sister     Thyroid Sister     Diabetes Sister     Thyroid Sister     Thyroid Sister     Thyroid Sister     Thyroid Sister        Social history:    Social History     Socioeconomic History    Marital status:      Spouse name: Not on file    Number of children: Not on file    Years of education: Not on file    Highest education level: Bachelor's degree (e.g., BA, AB, BS)   Occupational History    Not on file   Tobacco Use    Smoking status: Some Days     Current packs/day: 0.00     Types: Cigarettes     Last attempt to quit: 2000     Years since quittin.5    Smokeless tobacco: Never   Vaping Use    Vaping status: Never Used   Substance and Sexual Activity    Alcohol use: Yes     Comment: occ    Drug use: No    Sexual activity: Yes   Other Topics Concern    Not on file   Social History Narrative    Not on file     Social Drivers of Health     Financial Resource Strain: Medium Risk (2024)    Overall Financial Resource Strain (CARDIA)     Difficulty of Paying Living Expenses: Somewhat hard   Food Insecurity: No Food Insecurity (2024)    Hunger Vital Sign     Worried About Running Out of Food in the Last Year: Never true     Ran Out of Food in the Last Year: Never true   Transportation Needs: No Transportation Needs (2024)    PRAPARE - Transportation     Lack of Transportation (Medical): No     Lack of Transportation (Non-Medical): No   Physical Activity: Insufficiently Active (2024)    Exercise Vital Sign     Days of Exercise per Week: 1 day     Minutes of Exercise per Session: 10 min   Stress: Stress Concern Present (2024)    [employer] - Occupational Stress Questionnaire     Feeling of Stress : To some extent   Social Connections: Unknown (2024)    Social Connection and Isolation Panel [NHANES]     Frequency of Communication with Friends and Family: More than three times a week     Frequency of Social Gatherings with Friends and Family: More than three times a week     Attends [employer] Services: Patient declined     Active Member of Clubs or Organizations: Yes     Attends Club or  Organization Meetings: 1 to 4 times per year     Marital Status:    Intimate Partner Violence: Not on file   Housing Stability: High Risk (11/23/2024)    Housing Stability Vital Sign     Unable to Pay for Housing in the Last Year: Yes     Number of Times Moved in the Last Year: 0     Homeless in the Last Year: No       Current medications:   Current Outpatient Medications   Medication    Cladribine, 8 Tabs, 10 MG Tablet Therapy Pack    amantadine (SYMMETREL) 100 MG Cap    nortriptyline (PAMELOR) 50 MG capsule    [START ON 10/8/2025] temazepam (RESTORIL) 30 MG capsule    baclofen (LIORESAL) 10 MG Tab    liothyronine (CYTOMEL) 5 MCG Tab    SYNTHROID 150 MCG Tab    modafinil (PROVIGIL) 200 MG Tab    Multiple Vitamins-Minerals (MULTIVITAMIN ADULT PO)    Iron Carbonyl-Vitamin C-FOS (CHEWABLE IRON PO)    B Complex Vitamins (VITAMIN B COMPLEX PO)    Ascorbic Acid (VITAMIN C) 1000 MG Tab    Omega 3-6-9 Fatty Acids (OMEGA 3-6-9 COMPLEX PO)    vitamin D (CHOLECALCIFEROL) 1000 UNIT Tab     No current facility-administered medications for this visit.       Medication Allergy:  Allergies[3]    Review of systems:   Constitutional: denies fever, night sweats, weight loss.   Eyes: denies acute vision change, eye pain or secretion.   Ears, Nose, Mouth, Throat: denies nasal secretion, nasal bleeding, difficulty swallowing, hearing loss, tinnitus, vertigo, ear pain, acute dental problems, oral ulcers or lesions.   Endocrine: denies recent weight changes, Positive HEAT intolerance, + polyuria, polydypsia, polyphagia,abnormal hair growth.  Cardiovascular: denies new onset of chest pain, palpitations, syncope, or dyspnea of exertion.  Pulmonary: denies shortness of breath, new onset of cough, hemoptysis, wheezing, chest pain or flu-like symptoms.   GI: denies nausea, vomiting, diarrhea, GI bleeding, change in appetite, abdominal pain, and change in bowel habits.  : denies dysuria, urinary incontinence, hematuria.  Heme/oncology:  "denies history of easy bruising or bleeding. No history of cancer, DVTor PE.  Allergy/immunology: denies hives/urticaria, or itching.   Dermatologic: denies new rash, or new skin lesions.  Musculoskeletal:denies joint swelling or pain except she does have RIGHT HIP PAIN, muscle pain, neck and back pain. Neurologic: denies headaches, acute visual changes, facial droopiness, muscle weakness (focal or generalized), paresthesias, anesthesia, ataxia, change in speech or language, memory loss, abnormal movements, seizures, loss of consciousness, or episodes of confusion.   Psychiatric: denies symptoms of depression, anxiety, hallucinations, mood swings or changes, suicidal or homicidal thoughts.     Physical examination:   Vitals:    07/29/25 0957   BP: 126/70   BP Location: Left arm   Patient Position: Sitting   BP Cuff Size: Adult   Pulse: 83   Temp: 35.9 °C (96.6 °F)   TempSrc: Temporal   SpO2: 99%   Weight: 88.9 kg (195 lb 15.8 oz)   Height: 1.626 m (5' 4\")     General: Patient in no acute distress, pleasant and cooperative.  HEENT: Normocephalic, no signs of acute trauma.   Neck: supple, no meningeal signs or carotid bruits. There is normal range of motion. No tenderness on exam.   Chest: clear to auscultation. No cough.   CV: RRR, no murmurs.   Skin: no signs of acute rashes or trauma.   Musculoskeletal: joints exhibit full range of motion, without any pain to palpation. There are no signs of joint or muscle swelling. There is no tenderness to deep palpation of muscles.   Psychiatric: No hallucinatory behavior. Denies symptoms of depression or suicidal ideation. Mood and affect appear normal on exam.     NEUROLOGICAL EXAM:   Mental status, orientation: Awake, alert and fully oriented.   Speech and language: speech is clear and fluent. The patient is able to name, repeat and comprehend.   Memory: There is intact recollection of recent and remote events.   Cranial nerve exam: Pupils are 3-4 mm bilaterally and equally " reactive to light and accommodation. Visual fields are intact by confrontation. Fundoscopic exam was unremarkable. There is no nystagmus on primary or secondary gaze. Intact full EOM in all directions of gaze. Face appears symmetric. Sensation in the face is intact to light touch. Uvula is midline. Palate elevates symmetrically. Tongue is midline and without any signs of tongue biting or fasciculations. Sternocleidomastoid muscles exhibit is normal strength bilaterally. Shoulder shrug is intact bilaterally.   Motor exam: Strength is 5/5 in all extremities. Tone is normal. No abnormal movements were seen on exam.   Sensory exam reveals normal sense of light touch, proprioception, vibration and pinprick in all extremities.   Deep tendon reflexes:  2+ throughout. Plantar responses are flexor. There is no clonus.   Coordination: shows a normal finger-nose-finger. Normal rapidly alternating movements.   Gait: The patient was able to get up from seated position on first attempt with cane requiring assistance. Found to be unsteady when walking. Movements were fluid with normal arm swing. The patient was able to turn without difficulties or tendency to fall. Romberg examination positive      ANCILLARY DATA REVIEWED:     Lab Data Review:  No results found for this or any previous visit (from the past 24 hours).    Records reviewed:      Latest Reference Range & Units Most Recent   WBC 4.8 - 10.8 K/uL 6.2  2/24/25 10:57   RBC 4.20 - 5.40 M/uL 4.86  2/24/25 10:57   Hemoglobin 12.0 - 16.0 g/dL 13.1  2/24/25 10:57   Hematocrit 37.0 - 47.0 % 39.9  2/24/25 10:57   MCV 81.4 - 97.8 fL 82.1  2/24/25 10:57   MCH 27.0 - 33.0 pg 27.0  2/24/25 10:57   MCHC 32.2 - 35.5 g/dL 32.8  2/24/25 10:57   RDW 35.9 - 50.0 fL 47.1  2/24/25 10:57   Platelet Count 164 - 446 K/uL 278  2/24/25 10:57   MPV 9.0 - 12.9 fL 9.4  2/24/25 10:57   Neutrophils-Polys 44.00 - 72.00 % 56.70  2/24/25 10:57   Neutrophils (Absolute) 1.82 - 7.42 K/uL 3.51  2/24/25 10:57    Lymphocytes 22.00 - 41.00 % 34.10  2/24/25 10:57   Lymphs (Absolute) 1.00 - 4.80 K/uL 2.11  2/24/25 10:57   Monocytes 0.00 - 13.40 % 7.10  2/24/25 10:57   Monos (Absolute) 0.00 - 0.85 K/uL 0.44  2/24/25 10:57   Eosinophils 0.00 - 6.90 % 1.30  2/24/25 10:57   Eos (Absolute) 0.00 - 0.51 K/uL 0.08  2/24/25 10:57   Basophils 0.00 - 1.80 % 0.60  2/24/25 10:57   Baso (Absolute) 0.00 - 0.12 K/uL 0.04  2/24/25 10:57   Immature Granulocytes 0.00 - 0.90 % 0.20  2/24/25 10:57   Immature Granulocytes (abs) 0.00 - 0.11 K/uL 0.01  2/24/25 10:57   Nucleated RBC 0.00 - 0.20 /100 WBC 0.00  2/24/25 10:57   NRBC (Absolute) K/uL 0.00  2/24/25 10:57      Latest Reference Range & Units Most Recent   Sodium 135 - 145 mmol/L 140  2/24/25 10:57   Potassium 3.6 - 5.5 mmol/L 3.8  2/24/25 10:57   Chloride 96 - 112 mmol/L 107  2/24/25 10:57   Co2 20 - 33 mmol/L 23  2/24/25 10:57   Anion Gap 7.0 - 16.0  10.0  2/24/25 10:57   Glucose 65 - 99 mg/dL 99  2/24/25 10:57   Bun 8 - 22 mg/dL 12  2/24/25 10:57   Creatinine 0.50 - 1.40 mg/dL 0.79  2/24/25 10:57   GFR (CKD-EPI) >60 mL/min/1.73 m 2 85  2/24/25 10:57   GFR If African American >60 mL/min/1.73 m 2 >60  9/22/20 13:57   GFR If Non African American >60 mL/min/1.73 m 2 >60  9/22/20 13:57   Calcium 8.5 - 10.5 mg/dL 9.1  2/24/25 10:57   Correct Calcium 8.5 - 10.5 mg/dL 9.1  2/24/25 10:57   AST(SGOT) 12 - 45 U/L 19  2/24/25 10:57   ALT(SGPT) 2 - 50 U/L 12  2/24/25 10:57   Alkaline Phosphatase 30 - 99 U/L 140 (H)  2/24/25 10:57   Total Bilirubin 0.1 - 1.5 mg/dL 0.5  2/24/25 10:57   Albumin 3.2 - 4.9 g/dL 4.0  2/24/25 10:57   Total Protein 6.0 - 8.2 g/dL 6.7  2/24/25 10:57   Globulin 1.9 - 3.5 g/dL 2.7  2/24/25 10:57   A-G Ratio g/dL 1.5  2/24/25 10:57   Pre-Albumin 18.0 - 38.0 mg/dL 23.0  12/15/16 07:30   C Reactive Protein High Sensitive 0.0 - 7.5 mg/L 0.2  1/19/16 09:38   (H): Data is abnormally high            Imaging:        Latest Reference Range & Units Most Recent   Sodium 135 - 145 mmol/L  140  2/24/25 10:57   Potassium 3.6 - 5.5 mmol/L 3.8  2/24/25 10:57   Chloride 96 - 112 mmol/L 107  2/24/25 10:57   Co2 20 - 33 mmol/L 23  2/24/25 10:57   Anion Gap 7.0 - 16.0  10.0  2/24/25 10:57   Glucose 65 - 99 mg/dL 99  2/24/25 10:57   Bun 8 - 22 mg/dL 12  2/24/25 10:57   Creatinine 0.50 - 1.40 mg/dL 0.79  2/24/25 10:57   GFR (CKD-EPI) >60 mL/min/1.73 m 2 85  2/24/25 10:57   GFR If African American >60 mL/min/1.73 m 2 >60  9/22/20 13:57   GFR If Non African American >60 mL/min/1.73 m 2 >60  9/22/20 13:57   Calcium 8.5 - 10.5 mg/dL 9.1  2/24/25 10:57   Correct Calcium 8.5 - 10.5 mg/dL 9.1  2/24/25 10:57   AST(SGOT) 12 - 45 U/L 19  2/24/25 10:57   ALT(SGPT) 2 - 50 U/L 12  2/24/25 10:57   Alkaline Phosphatase 30 - 99 U/L 140 (H)  2/24/25 10:57   Total Bilirubin 0.1 - 1.5 mg/dL 0.5  2/24/25 10:57   Albumin 3.2 - 4.9 g/dL 4.0  2/24/25 10:57   Total Protein 6.0 - 8.2 g/dL 6.7  2/24/25 10:57   Globulin 1.9 - 3.5 g/dL 2.7  2/24/25 10:57   A-G Ratio g/dL 1.5  2/24/25 10:57   Pre-Albumin 18.0 - 38.0 mg/dL 23.0  12/15/16 07:30   C Reactive Protein High Sensitive 0.0 - 7.5 mg/L 0.2  1/19/16 09:38   (H): Data is abnormally high        ASSESSMENT AND PLAN:    1. Multiple sclerosis (HCC) (Primary)  [employer] and symptomatic medications refilled as below for her MS treatment. Ptregla tis due for her MRI scan and has had some progression of disease will she is off of the DMT> She feels her right leg is getting weaker in addition to her left  - Cladribine, 8 Tabs, 10 MG Tablet Therapy Pack; Take 20 mg days 1-3 and 10mg days 4 and 5. Will repeat dosage one month later  Dispense: 8 Each; Refill: 1  - amantadine (SYMMETREL) 100 MG Cap; Take 1 Capsule by mouth 2 times a day.  Dispense: 180 Capsule; Refill: 3  - nortriptyline (PAMELOR) 50 MG capsule; Take 1 Capsule by mouth every evening.  Dispense: 90 Capsule; Refill: 3  - temazepam (RESTORIL) 30 MG capsule; Take 1 Capsule by mouth at bedtime as needed for Sleep for up to 180 days.  Indications: Trouble Sleeping  Dispense: 90 Capsule; Refill: 1  - baclofen (LIORESAL) 10 MG Tab; Take 1 Tablet by mouth 3 times a day.  Dispense: 270 Tablet; Refill: 3  - MR-BRAIN-WITH & W/O; Future  - CBC WITH DIFFERENTIAL; Future  - Comp Metabolic Panel; Future    2. Overactive bladder  Refill as below  - nortriptyline (PAMELOR) 50 MG capsule; Take 1 Capsule by mouth every evening.  Dispense: 90 Capsule; Refill: 3    3. Primary insomnia  I gave patient refills for next 6 months starting in October  - temazepam (RESTORIL) 30 MG capsule; Take 1 Capsule by mouth at bedtime as needed for Sleep for up to 180 days. Indications: Trouble Sleeping  Dispense: 90 Capsule; Refill: 1        FOLLOW-UP:   Return in about 4 months (around 11/29/2025).    My total time spent caring for the patient on the day of the encounter was 41 minutes.   This does not include time spent on separately billable procedures/tests.       EDUCATION AND COUNSELING:  -Discussed regular exercise program and prevention of cardiovascular disease, including stroke.   -Discussed healthy lifestyle, including: healthy diet (rich in fruits, vegetables, nuts and healthy oils); proper hydration, and adequate sleep hygiene (allowing 7-8 hrs of overnight sleep).        Melissa Bloch, MD  Clinical  of Neurology [facility].   Diplomate in Neurology.   Office: [phone]  Fax: [phone]             [1]   Past Medical History:  Diagnosis Date    Hashimoto's disease 2004    MS (multiple sclerosis) (HCC) 2007   [2]   Past Surgical History:  Procedure Laterality Date    CHOLECYSTECTOMY  2010    ABDOMINAL HYSTERECTOMY TOTAL  2008    Due to scar tissue from LCTS x3, causing pain    FOOT SURGERY  2001    left foot reattached   [3]   Allergies  Allergen Reactions    Dimethyl Fumarate Anaphylaxis    Milk Products Food Allergy Hives    Citrus Hives and Itching

## 2025-07-29 NOTE — TELEPHONE ENCOUNTER
Received [employer]  request via [employer]  for   amantadine (SYMMETREL) 100 MG Cap. (Quantity:180, Day Supply:90)     Insurance: [employer]  Member ID:  [id number]  BIN: [id number]  PCN: [id number]  Group: [id number]     Ran Test claim via [place] & medication Rtc RTS- will release to pt's preferred pharmacy on file.

## 2025-07-29 NOTE — TELEPHONE ENCOUNTER
Received New Start PA request via [employer]  for   Cladribine, 8 Tabs, 10 MG Tablet Therapy Pack. (Quantity:8, Day Supply:30)     Insurance: [employer]  Member ID:  [id number]  BIN: [id number]  PCN: [id number]  Group: [id number]     Ran Test claim via [place] & medication Rejects stating prior authorization is required.

## 2025-07-29 NOTE — ASSESSMENT & PLAN NOTE
Pt has about 6 hours of sleep and she does sometimes takes naps depending on her schedule. Pt states that if she sleeps less than she needs to nap in afternoon. She takes temazepam.

## 2025-07-30 NOTE — TELEPHONE ENCOUNTER
Prior Authorization for Cladribine, 8 Tabs, 10 MG Tablet Therapy Pack  (Quantity: 8, Days: 30) has been submitted via Cover My Meds: Key ([id number])    Insurance: First [employer]    Will follow up in 24-48 business hours.

## 2025-07-31 NOTE — TELEPHONE ENCOUNTER
Prior Authorization for Cladribine, 8 Tabs, 10 MG Tablet Therapy Pack  has been denied for a quantity of 8 , day supply 30    Prior authorization was denied per the following:     Prior Authorization denial reference number: N/A  Insurance: First Choice      Next Steps:Proceed with appeal process. Generate proposed appeal for provider approval & signature.

## 2025-08-11 ENCOUNTER — SPECIALTY PHARMACY (OUTPATIENT)
Dept: PHARMACY | Facility: MEDICAL CENTER | Age: 60
End: 2025-08-11
Payer: MEDICARE

## 2025-08-27 ENCOUNTER — APPOINTMENT (OUTPATIENT)
Dept: LAB | Facility: MEDICAL CENTER | Age: 60
End: 2025-08-27
Payer: MEDICARE